# Patient Record
Sex: MALE | Race: WHITE | Employment: OTHER | URBAN - METROPOLITAN AREA
[De-identification: names, ages, dates, MRNs, and addresses within clinical notes are randomized per-mention and may not be internally consistent; named-entity substitution may affect disease eponyms.]

---

## 2019-05-23 LAB
ALBUMIN SERPL-MCNC: NORMAL G/DL
ALP BLD-CCNC: NORMAL U/L
ALT SERPL-CCNC: NORMAL U/L
ANION GAP SERPL CALCULATED.3IONS-SCNC: NORMAL MMOL/L
AST SERPL-CCNC: NORMAL U/L
AVERAGE GLUCOSE: NORMAL
BASOPHILS ABSOLUTE: NORMAL
BASOPHILS RELATIVE PERCENT: NORMAL
BILIRUB SERPL-MCNC: NORMAL MG/DL
BUN BLDV-MCNC: NORMAL MG/DL
CALCIUM SERPL-MCNC: NORMAL MG/DL
CHLORIDE BLD-SCNC: NORMAL MMOL/L
CO2: NORMAL
CREAT SERPL-MCNC: NORMAL MG/DL
EOSINOPHILS ABSOLUTE: NORMAL
EOSINOPHILS RELATIVE PERCENT: NORMAL
GFR CALCULATED: NORMAL
GLUCOSE BLD-MCNC: NORMAL MG/DL
HBA1C MFR BLD: 7 %
HCT VFR BLD CALC: NORMAL %
HEMOGLOBIN: NORMAL
LYMPHOCYTES ABSOLUTE: NORMAL
LYMPHOCYTES RELATIVE PERCENT: NORMAL
MCH RBC QN AUTO: NORMAL PG
MCHC RBC AUTO-ENTMCNC: NORMAL G/DL
MCV RBC AUTO: NORMAL FL
MONOCYTES ABSOLUTE: NORMAL
MONOCYTES RELATIVE PERCENT: NORMAL
NEUTROPHILS ABSOLUTE: NORMAL
NEUTROPHILS RELATIVE PERCENT: NORMAL
PLATELET # BLD: NORMAL 10*3/UL
PMV BLD AUTO: NORMAL FL
POTASSIUM SERPL-SCNC: NORMAL MMOL/L
RBC # BLD: NORMAL 10*6/UL
SODIUM BLD-SCNC: NORMAL MMOL/L
TOTAL PROTEIN: NORMAL
WBC # BLD: NORMAL 10*3/UL

## 2020-08-04 ENCOUNTER — TELEPHONE (OUTPATIENT)
Dept: PRIMARY CARE CLINIC | Age: 80
End: 2020-08-04

## 2020-09-09 RX ORDER — CELECOXIB 200 MG/1
200 CAPSULE ORAL DAILY
Qty: 30 CAPSULE | Refills: 0 | Status: ON HOLD
Start: 2020-09-09 | End: 2021-01-30 | Stop reason: HOSPADM

## 2020-09-09 RX ORDER — GABAPENTIN 300 MG/1
300 CAPSULE ORAL 2 TIMES DAILY
Qty: 60 CAPSULE | Refills: 0 | Status: SHIPPED
Start: 2020-09-09 | End: 2021-09-29 | Stop reason: SDUPTHER

## 2020-09-09 RX ORDER — METOPROLOL SUCCINATE 25 MG/1
25 TABLET, EXTENDED RELEASE ORAL 2 TIMES DAILY
Qty: 60 TABLET | Refills: 0 | Status: ON HOLD
Start: 2020-09-09 | End: 2021-01-30 | Stop reason: HOSPADM

## 2020-09-09 RX ORDER — GLIPIZIDE 5 MG/1
5 TABLET ORAL
Qty: 60 TABLET | Refills: 0 | Status: SHIPPED
Start: 2020-09-09 | End: 2022-02-08 | Stop reason: SDUPTHER

## 2020-09-09 RX ORDER — GABAPENTIN 300 MG/1
300 CAPSULE ORAL 2 TIMES DAILY
COMMUNITY
End: 2020-09-09 | Stop reason: SDUPTHER

## 2020-09-09 RX ORDER — CELECOXIB 200 MG/1
200 CAPSULE ORAL DAILY
COMMUNITY
End: 2020-09-09 | Stop reason: SDUPTHER

## 2020-09-09 RX ORDER — GLIPIZIDE 5 MG/1
5 TABLET ORAL
COMMUNITY
End: 2020-09-09 | Stop reason: SDUPTHER

## 2020-09-09 RX ORDER — METOPROLOL SUCCINATE 25 MG/1
25 TABLET, EXTENDED RELEASE ORAL 2 TIMES DAILY
COMMUNITY
End: 2020-09-09 | Stop reason: SDUPTHER

## 2020-10-01 RX ORDER — OMEPRAZOLE 20 MG/1
20 CAPSULE, DELAYED RELEASE ORAL DAILY
Status: ON HOLD | COMMUNITY
End: 2021-01-30 | Stop reason: HOSPADM

## 2021-01-11 ENCOUNTER — HOSPITAL ENCOUNTER (OUTPATIENT)
Age: 81
Discharge: HOME | End: 2021-01-13
Payer: MEDICARE

## 2021-01-11 PROCEDURE — U0003 INFECTIOUS AGENT DETECTION BY NUCLEIC ACID (DNA OR RNA); SEVERE ACUTE RESPIRATORY SYNDROME CORONAVIRUS 2 (SARS-COV-2) (CORONAVIRUS DISEASE [COVID-19]), AMPLIFIED PROBE TECHNIQUE, MAKING USE OF HIGH THROUGHPUT TECHNOLOGIES AS DESCRIBED BY CMS-2020-01-R: HCPCS

## 2021-01-12 DIAGNOSIS — Z01.810 PREOP CARDIOVASCULAR EXAM: ICD-10-CM

## 2021-01-13 LAB
SARS-COV-2: NOT DETECTED
SOURCE: NORMAL

## 2021-01-15 ENCOUNTER — HOSPITAL ENCOUNTER (INPATIENT)
Dept: CARDIAC CATH/INVASIVE PROCEDURES | Age: 81
LOS: 16 days | Discharge: NURSING, REHAB OR LONG-TERM CARE | DRG: 233 | End: 2021-02-01
Attending: INTERNAL MEDICINE | Admitting: INTERNAL MEDICINE
Payer: MEDICARE

## 2021-01-15 DIAGNOSIS — Z01.810 PREOP CARDIOVASCULAR EXAM: Primary | ICD-10-CM

## 2021-01-15 DIAGNOSIS — Z95.1 S/P CABG (CORONARY ARTERY BYPASS GRAFT): ICD-10-CM

## 2021-01-15 DIAGNOSIS — G89.18 ACUTE POST-OPERATIVE PAIN: ICD-10-CM

## 2021-01-15 DIAGNOSIS — I20.8 STABLE ANGINA PECTORIS (HCC): ICD-10-CM

## 2021-01-15 PROBLEM — I20.89 STABLE ANGINA PECTORIS (HCC): Status: ACTIVE | Noted: 2021-01-15

## 2021-01-15 LAB
ABO/RH: NORMAL
ANTIBODY SCREEN: NORMAL
APTT: 74.3 SEC (ref 24.5–35.1)
METER GLUCOSE: 266 MG/DL (ref 74–99)

## 2021-01-15 PROCEDURE — C1725 CATH, TRANSLUMIN NON-LASER: HCPCS

## 2021-01-15 PROCEDURE — 86901 BLOOD TYPING SEROLOGIC RH(D): CPT

## 2021-01-15 PROCEDURE — G0379 DIRECT REFER HOSPITAL OBSERV: HCPCS

## 2021-01-15 PROCEDURE — 96374 THER/PROPH/DIAG INJ IV PUSH: CPT

## 2021-01-15 PROCEDURE — 6370000000 HC RX 637 (ALT 250 FOR IP): Performed by: HOSPITALIST

## 2021-01-15 PROCEDURE — 36415 COLL VENOUS BLD VENIPUNCTURE: CPT

## 2021-01-15 PROCEDURE — 93454 CORONARY ARTERY ANGIO S&I: CPT

## 2021-01-15 PROCEDURE — B31H1ZZ FLUOROSCOPY OF RIGHT UPPER EXTREMITY ARTERIES USING LOW OSMOLAR CONTRAST: ICD-10-PCS | Performed by: INTERNAL MEDICINE

## 2021-01-15 PROCEDURE — 85730 THROMBOPLASTIN TIME PARTIAL: CPT

## 2021-01-15 PROCEDURE — 75710 ARTERY X-RAYS ARM/LEG: CPT

## 2021-01-15 PROCEDURE — 86900 BLOOD TYPING SEROLOGIC ABO: CPT

## 2021-01-15 PROCEDURE — 2580000003 HC RX 258: Performed by: INTERNAL MEDICINE

## 2021-01-15 PROCEDURE — 6370000000 HC RX 637 (ALT 250 FOR IP): Performed by: FAMILY MEDICINE

## 2021-01-15 PROCEDURE — 6360000002 HC RX W HCPCS

## 2021-01-15 PROCEDURE — 82962 GLUCOSE BLOOD TEST: CPT

## 2021-01-15 PROCEDURE — 037B3ZZ DILATION OF RIGHT RADIAL ARTERY, PERCUTANEOUS APPROACH: ICD-10-PCS | Performed by: INTERNAL MEDICINE

## 2021-01-15 PROCEDURE — 2500000003 HC RX 250 WO HCPCS

## 2021-01-15 PROCEDURE — G0378 HOSPITAL OBSERVATION PER HR: HCPCS

## 2021-01-15 PROCEDURE — 6370000000 HC RX 637 (ALT 250 FOR IP): Performed by: INTERNAL MEDICINE

## 2021-01-15 PROCEDURE — 2709999900 HC NON-CHARGEABLE SUPPLY

## 2021-01-15 PROCEDURE — C1894 INTRO/SHEATH, NON-LASER: HCPCS

## 2021-01-15 PROCEDURE — 86850 RBC ANTIBODY SCREEN: CPT

## 2021-01-15 PROCEDURE — C1769 GUIDE WIRE: HCPCS

## 2021-01-15 PROCEDURE — B2111ZZ FLUOROSCOPY OF MULTIPLE CORONARY ARTERIES USING LOW OSMOLAR CONTRAST: ICD-10-PCS | Performed by: INTERNAL MEDICINE

## 2021-01-15 PROCEDURE — 6360000002 HC RX W HCPCS: Performed by: INTERNAL MEDICINE

## 2021-01-15 PROCEDURE — 37246 TRLUML BALO ANGIOP 1ST ART: CPT

## 2021-01-15 PROCEDURE — C1887 CATHETER, GUIDING: HCPCS

## 2021-01-15 PROCEDURE — 93454 CORONARY ARTERY ANGIO S&I: CPT | Performed by: INTERNAL MEDICINE

## 2021-01-15 RX ORDER — ROSUVASTATIN CALCIUM 20 MG/1
20 TABLET, COATED ORAL NIGHTLY
Status: DISCONTINUED | OUTPATIENT
Start: 2021-01-15 | End: 2021-01-22

## 2021-01-15 RX ORDER — MECOBALAMIN 5000 MCG
10 TABLET,DISINTEGRATING ORAL NIGHTLY
Status: DISCONTINUED | OUTPATIENT
Start: 2021-01-15 | End: 2021-01-22

## 2021-01-15 RX ORDER — HEPARIN SODIUM 10000 [USP'U]/100ML
11.3 INJECTION, SOLUTION INTRAVENOUS CONTINUOUS
Status: DISCONTINUED | OUTPATIENT
Start: 2021-01-15 | End: 2021-01-22

## 2021-01-15 RX ORDER — DEXTROSE MONOHYDRATE 25 G/50ML
12.5 INJECTION, SOLUTION INTRAVENOUS PRN
Status: DISCONTINUED | OUTPATIENT
Start: 2021-01-15 | End: 2021-01-22

## 2021-01-15 RX ORDER — METOPROLOL SUCCINATE 50 MG/1
50 TABLET, EXTENDED RELEASE ORAL 2 TIMES DAILY
Status: DISCONTINUED | OUTPATIENT
Start: 2021-01-15 | End: 2021-01-22

## 2021-01-15 RX ORDER — SODIUM CHLORIDE 0.9 % (FLUSH) 0.9 %
10 SYRINGE (ML) INJECTION EVERY 12 HOURS SCHEDULED
Status: DISCONTINUED | OUTPATIENT
Start: 2021-01-15 | End: 2021-01-22

## 2021-01-15 RX ORDER — HEPARIN SODIUM 1000 [USP'U]/ML
2000 INJECTION, SOLUTION INTRAVENOUS; SUBCUTANEOUS PRN
Status: DISCONTINUED | OUTPATIENT
Start: 2021-01-15 | End: 2021-01-22

## 2021-01-15 RX ORDER — SODIUM CHLORIDE 0.9 % (FLUSH) 0.9 %
10 SYRINGE (ML) INJECTION PRN
Status: DISCONTINUED | OUTPATIENT
Start: 2021-01-15 | End: 2021-01-22

## 2021-01-15 RX ORDER — HEPARIN SODIUM 1000 [USP'U]/ML
4000 INJECTION, SOLUTION INTRAVENOUS; SUBCUTANEOUS PRN
Status: DISCONTINUED | OUTPATIENT
Start: 2021-01-15 | End: 2021-01-22

## 2021-01-15 RX ORDER — ASPIRIN 81 MG/1
81 TABLET, CHEWABLE ORAL DAILY
Status: DISCONTINUED | OUTPATIENT
Start: 2021-01-15 | End: 2021-01-22

## 2021-01-15 RX ORDER — NICOTINE POLACRILEX 4 MG
15 LOZENGE BUCCAL PRN
Status: DISCONTINUED | OUTPATIENT
Start: 2021-01-15 | End: 2021-01-22

## 2021-01-15 RX ORDER — HEPARIN SODIUM 1000 [USP'U]/ML
4000 INJECTION, SOLUTION INTRAVENOUS; SUBCUTANEOUS ONCE
Status: COMPLETED | OUTPATIENT
Start: 2021-01-15 | End: 2021-01-15

## 2021-01-15 RX ORDER — GABAPENTIN 300 MG/1
300 CAPSULE ORAL 2 TIMES DAILY
Status: DISCONTINUED | OUTPATIENT
Start: 2021-01-15 | End: 2021-02-01 | Stop reason: HOSPADM

## 2021-01-15 RX ORDER — DEXTROSE MONOHYDRATE 50 MG/ML
100 INJECTION, SOLUTION INTRAVENOUS PRN
Status: DISCONTINUED | OUTPATIENT
Start: 2021-01-15 | End: 2021-01-22

## 2021-01-15 RX ORDER — ACETAMINOPHEN 325 MG/1
650 TABLET ORAL EVERY 4 HOURS PRN
Status: DISCONTINUED | OUTPATIENT
Start: 2021-01-15 | End: 2021-01-22

## 2021-01-15 RX ORDER — AMLODIPINE BESYLATE 5 MG/1
5 TABLET ORAL DAILY
Status: DISCONTINUED | OUTPATIENT
Start: 2021-01-15 | End: 2021-01-16

## 2021-01-15 RX ADMIN — ROSUVASTATIN 20 MG: 20 TABLET, FILM COATED ORAL at 20:55

## 2021-01-15 RX ADMIN — HEPARIN SODIUM 4000 UNITS: 1000 INJECTION INTRAVENOUS; SUBCUTANEOUS at 16:27

## 2021-01-15 RX ADMIN — HEPARIN SODIUM 11.3 UNITS/KG/HR: 10000 INJECTION, SOLUTION INTRAVENOUS at 16:22

## 2021-01-15 RX ADMIN — INSULIN LISPRO 3 UNITS: 100 INJECTION, SOLUTION INTRAVENOUS; SUBCUTANEOUS at 20:56

## 2021-01-15 RX ADMIN — GABAPENTIN 300 MG: 300 CAPSULE ORAL at 16:29

## 2021-01-15 RX ADMIN — Medication 10 MG: at 22:14

## 2021-01-15 RX ADMIN — Medication 10 ML: at 20:55

## 2021-01-15 RX ADMIN — METOPROLOL SUCCINATE 50 MG: 50 TABLET, EXTENDED RELEASE ORAL at 20:55

## 2021-01-15 ASSESSMENT — PAIN SCALES - GENERAL
PAINLEVEL_OUTOF10: 0
PAINLEVEL_OUTOF10: 0

## 2021-01-15 NOTE — PROGRESS NOTES
vasc band weaned per protocol. see trend flowsheet. site soft. cleaned with alcohol. dsd applied with opsite drsg applied over that.radial pulse 3 plus.

## 2021-01-15 NOTE — H&P
Hospital Medicine History & Physical      PCP: WILVER Salguero NP    Date of Admission: 1/15/2021    Date of Service: Pt seen/examined on 1/15/21 Placed in Observation. Chief Complaint:  Chest pain      History Of Present Illness:     68 y.o. male with PMH of cancer, COPD, DM, blood clots, HLD and Covid who presented to [address] [place] [address] with chest pain. He states he had Covid back in November and during that time was believed to have had a non-STEMI. He presented to the hospital today for an elective cardiac cath. 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 CTS has been consulted. He will started on statin and BB. The patient states he has intermittent chest pain radiating to his back, states it is 6/10 and dull. He does not smoke but uses smokeless tobacco    Past Medical History:          Diagnosis Date    Arthritis     [facility]     COPD [facility]chronic obstructive pulmonary disease) ([facility]     Diabetes mellitus ([facility]     Hx of blood clots     Hyperlipidemia        Past Surgical History:          Procedure Laterality Date    BACK SURGERY  1995    CHOLECYSTECTOMY  2003    SKIN CANCER EXCISION         Medications Prior to Admission:      Prior to Admission medications    Medication Sig Start Date End Date Taking? Authorizing Provider   omeprazole (PRILOSEC) 20 MG delayed release capsule Take 20 mg by mouth daily   Yes Historical Provider, MD   MELATONIN PO Take by mouth   Yes Historical Provider, MD   celecoxib (CELEBREX) 200 MG capsule Take 1 capsule by mouth daily 9/9/20  Yes WILVER Hopper NP   glipiZIDE (GLUCOTROL) 5 MG tablet Take 1 tablet by mouth 2 times daily (before meals) 9/9/20  Yes WILVER Hopper NP   metoprolol succinate (TOPROL XL) 25 MG extended release tablet Take 1 tablet by mouth 2 times daily 9/9/20  Yes WILVER Hopper NP   gabapentin (NEURONTIN) 300 MG capsule Take 1 capsule by mouth 2 times daily for 30 days. 9/9/20 10/9/20  WILVER Eugene - NP       Allergies:  Shrimp (diagnostic) and Valium [diazepam]    Social History:      The patient currently lives at home. TOBACCO:   reports that he has quit smoking. He has never used smokeless tobacco.  ETOH:   has no history on file for alcohol. Family History:       Positive as follows:        Problem Relation Age of Onset    Diabetes Mother        REVIEW OF SYSTEMS:   Pertinent positives as noted in the HPI. All other systems reviewed and negative. PHYSICAL EXAM:    /73   Pulse 72   Temp 97.2 °F (36.2 °C) (Temporal)   Resp 16   Ht 5' 7\" (1.702 m)   Wt 195 lb (88.5 kg)   SpO2 94%   BMI 30.54 kg/m²     General appearance:  No apparent distress, appears stated age and cooperative. HEENT:  Normal cephalic, atraumatic without obvious deformity. Pupils equal, round, and reactive to light. Extra ocular muscles intact. Conjunctivae/corneas clear. Neck: Supple, with full range of motion. No jugular venous distention. Trachea midline. Respiratory:  Normal respiratory effort. Clear to auscultation, bilaterally without Rales/Wheezes/Rhonchi. Cardiovascular:  Regular rate and rhythm with normal S1/S2 without murmurs, rubs or gallops. Abdomen: Soft, non-tender, non-distended with normal bowel sounds. Musculoskeletal:  No clubbing, cyanosis or edema bilaterally. Full range of motion without deformity. Skin: Skin color, texture, turgor normal.  No rashes or lesions. Neurologic:  Neurovascularly intact without any focal sensory/motor deficits. Psychiatric:  Alert and oriented, thought content appropriate, normal insight  Capillary Refill: Brisk,< 3 seconds   Peripheral Pulses: +2 palpable, equal bilaterally           Labs:     No results for input(s): WBC, HGB, HCT, PLT in the last 72 hours. No results for input(s): NA, K, CL, CO2, BUN, CREATININE, CALCIUM, PHOS in the last 72 hours.     Invalid input(s): MAGNES  No results for input(s): AST, ALT, BILIDIR, BILITOT, ALKPHOS in the last 72 hours. No results for input(s): INR in the last 72 hours. No results for input(s): Geofm Squibb in the last 72 hours. Urinalysis:    No results found for: Elenin Delvison, BACTERIA, RBCUA, BLOODU, SPECGRAV, GLUCOSEU      ASSESSMENT:    Active Hospital Problems    Diagnosis Date Noted    Stable angina pectoris ([facility] [I20.8] 01/15/2021       PLAN:  Consult CTS  Start statin  BB  Home medications as ordered  Monitor labs  Daily weights  I/Os      DVT Prophylaxis: heparin  Diet: DIET LOW SODIUM 2 GM;  Code Status: Full Code    PT/OT Eval Status: n/a    Dispo - observation telemetry       Mamie Hendricks CNP    Thank you WILVER Acuna NP for the opportunity to be involved in this patient's care. If you have any questions or concerns please feel free to contact me via [address] [place] [address].

## 2021-01-15 NOTE — PROCEDURES
CARDIAC CATHETERIZATION REPORT    : Chapo Torres MD     Date of procedure:01/15/21     Indication:  1. Abnormal stress test    Procedures:  Left heart catheterization and Coronary angiogram     Sedation/analgesia:  Midazolam IV and Fentanyl IV     Time sedation was administered: 1019. I was present in the room when sedation was administered. Procedure end time: 1048  Time spent with face to face monitoring of moderate sedation: 38 minutes    Brief history:  72-year-old  male with hypertension, type 2 diabetes and COVID-19 pneumonia in November 2020 during which time he had a non-STEMI. Risk stratification perfusion study showed lateral and apical ischemia. TTE revealed normal LV size and systolic function and no severe valve disease. Description of procedure: The patient presented to the Cath Lab in a(n) elective fashion. The patient was prepped and draped in a sterile manner. Timeout, airway and ASA assessment were completed. Local anesthesia with 1% lidocaine was administered and sedation/analgesia were administered as above. Access was obtained using the modified Seldinger technique and a micropuncture needle in the right radial artery. A 6Fshort sheath was inserted over a wire. Diagnostic angiography was performed using 5F Lai and BOZENA diagnostic catheters. There was a significant stenosis in the proximal right radial artery that did not respond to vasodilators. I maneuvered through this using an Ikari R 1.5 guide over a [facility] wire using a 2 mm compliant balloon. Coronary anatomy:  Dominance: Right  1. Left main: Angiographically normal  2. LAD: 70% segmental proximal stenosis involving the takeoff of D1. There is a large bifurcating first diagonal; the superior branch in an [place] cranial view is the largest of the two and has a segmental 95% stenosis with GEOFFREY-3 flow.   The mid LAD has a 70% stenosis in the distal LAD has a 99% stenosis with GEOFFREY 2 flow  3. Ramus intermedius: Large vessel with 30% stenosis proximally and 99% segmental stenosis in its mid segment with GEOFFREY 2 flow  4. Left circumflex: Consists of a large OM and large AV groove circumflex. There is a 40% segmental stenosis in the distal circumflex  5. RCA: Fairly tortuous vessel with a segmental 50% stenosis. There is a large RPDA, medium-sized RPL1 and a large RPL2. There is a 40% stenosis in RPL2    Hemodynamics: Ao: 134/71 with a mean of 98      Hemostasis:  Transradial band was applied for patent arterial hemostasis. Complications: None  Estimated blood loss: 10 mL  Contrast used: 110 mL  Air kerma: 369 mGy    Conclusions:  1. Severe three-vessel disease:  a. Proximal LAD, mid LAD, distal LAD with impaired flow  b. Large D1  c. Large ramus intermedius with impaired flow  2. Significant stenosis in the proximal right radial artery not responsive to vasodilators navigated successfully with balloon-assisted tracking    PLAN:  1. Recommend inpatient revascularization (given impaired epicardial flow), preferably with CABG given high SYNTAX score with proximal LAD and diabetic status  2. Recommend continuous anticoagulation with unfractionated heparin versus Lovenox until revascularization given impaired flow in the LAD and large ramus intermedius  3.  High intensity statin and beta-blocker        James Patel MD, [facility]  Interventional Cardiology/Structural Heart Disease

## 2021-01-16 ENCOUNTER — APPOINTMENT (OUTPATIENT)
Dept: CT IMAGING | Age: 81
DRG: 233 | End: 2021-01-16
Attending: INTERNAL MEDICINE
Payer: MEDICARE

## 2021-01-16 PROBLEM — E78.5 HLD (HYPERLIPIDEMIA): Status: ACTIVE | Noted: 2021-01-16

## 2021-01-16 PROBLEM — J44.9 COPD (CHRONIC OBSTRUCTIVE PULMONARY DISEASE) (HCC): Status: ACTIVE | Noted: 2021-01-16

## 2021-01-16 PROBLEM — E11.9 TYPE 2 DIABETES MELLITUS (HCC): Status: ACTIVE | Noted: 2021-01-16

## 2021-01-16 PROBLEM — I25.10 CAD (CORONARY ARTERY DISEASE): Status: ACTIVE | Noted: 2021-01-16

## 2021-01-16 PROBLEM — I10 HTN (HYPERTENSION): Status: ACTIVE | Noted: 2021-01-16

## 2021-01-16 LAB
ANION GAP SERPL CALCULATED.3IONS-SCNC: 12 MMOL/L (ref 7–16)
APTT: 52.7 SEC (ref 24.5–35.1)
BUN BLDV-MCNC: 12 MG/DL (ref 8–23)
CALCIUM SERPL-MCNC: 8.8 MG/DL (ref 8.6–10.2)
CHLORIDE BLD-SCNC: 104 MMOL/L (ref 98–107)
CO2: 21 MMOL/L (ref 22–29)
CREAT SERPL-MCNC: 0.7 MG/DL (ref 0.7–1.2)
GFR AFRICAN AMERICAN: >60
GFR NON-AFRICAN AMERICAN: >60 ML/MIN/1.73
GLUCOSE BLD-MCNC: 158 MG/DL (ref 74–99)
HBA1C MFR BLD: 6.6 % (ref 4–5.6)
HCT VFR BLD CALC: 37.3 % (ref 37–54)
HEMOGLOBIN: 12.4 G/DL (ref 12.5–16.5)
LV EF: 55 %
LVEF MODALITY: NORMAL
MCH RBC QN AUTO: 29.4 PG (ref 26–35)
MCHC RBC AUTO-ENTMCNC: 33.2 % (ref 32–34.5)
MCV RBC AUTO: 88.4 FL (ref 80–99.9)
METER GLUCOSE: 142 MG/DL (ref 74–99)
METER GLUCOSE: 144 MG/DL (ref 74–99)
METER GLUCOSE: 178 MG/DL (ref 74–99)
METER GLUCOSE: 180 MG/DL (ref 74–99)
PDW BLD-RTO: 14.4 FL (ref 11.5–15)
PLATELET # BLD: 192 E9/L (ref 130–450)
PMV BLD AUTO: 9.2 FL (ref 7–12)
POTASSIUM REFLEX MAGNESIUM: 4.3 MMOL/L (ref 3.5–5)
RBC # BLD: 4.22 E12/L (ref 3.8–5.8)
SODIUM BLD-SCNC: 137 MMOL/L (ref 132–146)
WBC # BLD: 7 E9/L (ref 4.5–11.5)

## 2021-01-16 PROCEDURE — 99221 1ST HOSP IP/OBS SF/LOW 40: CPT | Performed by: THORACIC SURGERY (CARDIOTHORACIC VASCULAR SURGERY)

## 2021-01-16 PROCEDURE — 85027 COMPLETE CBC AUTOMATED: CPT

## 2021-01-16 PROCEDURE — G0378 HOSPITAL OBSERVATION PER HR: HCPCS

## 2021-01-16 PROCEDURE — 80048 BASIC METABOLIC PNL TOTAL CA: CPT

## 2021-01-16 PROCEDURE — 82962 GLUCOSE BLOOD TEST: CPT

## 2021-01-16 PROCEDURE — 93005 ELECTROCARDIOGRAM TRACING: CPT | Performed by: INTERNAL MEDICINE

## 2021-01-16 PROCEDURE — 83036 HEMOGLOBIN GLYCOSYLATED A1C: CPT

## 2021-01-16 PROCEDURE — 6370000000 HC RX 637 (ALT 250 FOR IP): Performed by: INTERNAL MEDICINE

## 2021-01-16 PROCEDURE — 2140000000 HC CCU INTERMEDIATE R&B

## 2021-01-16 PROCEDURE — 85730 THROMBOPLASTIN TIME PARTIAL: CPT

## 2021-01-16 PROCEDURE — 6370000000 HC RX 637 (ALT 250 FOR IP): Performed by: HOSPITALIST

## 2021-01-16 PROCEDURE — 36415 COLL VENOUS BLD VENIPUNCTURE: CPT

## 2021-01-16 PROCEDURE — 6370000000 HC RX 637 (ALT 250 FOR IP): Performed by: FAMILY MEDICINE

## 2021-01-16 PROCEDURE — 71250 CT THORAX DX C-: CPT

## 2021-01-16 PROCEDURE — 6360000002 HC RX W HCPCS: Performed by: INTERNAL MEDICINE

## 2021-01-16 PROCEDURE — 93306 TTE W/DOPPLER COMPLETE: CPT

## 2021-01-16 RX ORDER — DOCUSATE SODIUM 100 MG/1
100 CAPSULE, LIQUID FILLED ORAL NIGHTLY
Status: DISCONTINUED | OUTPATIENT
Start: 2021-01-16 | End: 2021-01-22

## 2021-01-16 RX ORDER — AMLODIPINE BESYLATE 10 MG/1
10 TABLET ORAL DAILY
Status: DISCONTINUED | OUTPATIENT
Start: 2021-01-16 | End: 2021-01-22

## 2021-01-16 RX ADMIN — DOCUSATE SODIUM 100 MG: 100 CAPSULE ORAL at 21:34

## 2021-01-16 RX ADMIN — GABAPENTIN 300 MG: 300 CAPSULE ORAL at 08:40

## 2021-01-16 RX ADMIN — INSULIN LISPRO 2 UNITS: 100 INJECTION, SOLUTION INTRAVENOUS; SUBCUTANEOUS at 08:41

## 2021-01-16 RX ADMIN — GABAPENTIN 300 MG: 300 CAPSULE ORAL at 21:34

## 2021-01-16 RX ADMIN — INSULIN LISPRO 2 UNITS: 100 INJECTION, SOLUTION INTRAVENOUS; SUBCUTANEOUS at 17:28

## 2021-01-16 RX ADMIN — AMLODIPINE BESYLATE 10 MG: 10 TABLET ORAL at 08:40

## 2021-01-16 RX ADMIN — INSULIN LISPRO 1 UNITS: 100 INJECTION, SOLUTION INTRAVENOUS; SUBCUTANEOUS at 21:38

## 2021-01-16 RX ADMIN — ASPIRIN 81 MG: 81 TABLET, CHEWABLE ORAL at 10:03

## 2021-01-16 RX ADMIN — INSULIN LISPRO 2 UNITS: 100 INJECTION, SOLUTION INTRAVENOUS; SUBCUTANEOUS at 12:38

## 2021-01-16 RX ADMIN — METOPROLOL SUCCINATE 50 MG: 50 TABLET, EXTENDED RELEASE ORAL at 21:34

## 2021-01-16 RX ADMIN — METOPROLOL SUCCINATE 50 MG: 50 TABLET, EXTENDED RELEASE ORAL at 08:40

## 2021-01-16 RX ADMIN — ROSUVASTATIN 20 MG: 20 TABLET, FILM COATED ORAL at 21:34

## 2021-01-16 RX ADMIN — Medication 10 MG: at 22:32

## 2021-01-16 RX ADMIN — HEPARIN SODIUM 11.3 UNITS/KG/HR: 10000 INJECTION, SOLUTION INTRAVENOUS at 17:29

## 2021-01-16 ASSESSMENT — PAIN SCALES - GENERAL
PAINLEVEL_OUTOF10: 0

## 2021-01-16 NOTE — CONSULTS
CTS Consult    Patient name: Malena Mandujano    Reason for consult:  MVCAD    Primary Care Physician: WILVER Tinoco NP    Date of service: 1/16/2021    Chief Complaint:  Angina     HPI:  67 yo male with hx of NSTEMI back in November at which time he was covid positive. Cath at that time was deferred, and he underwent outpatient workup and recovered from covid. He underwent elective LHC yesterday which revealed severe MVCAD. CTS is consulted for opinion on revascularization. Allergies:    Allergies   Allergen Reactions    Shrimp (Diagnostic)     Valium [Diazepam]        Home medications:    Current Facility-Administered Medications   Medication Dose Route Frequency Provider Last Rate Last Admin    perflutren lipid microspheres (DEFINITY) injection 1.65 mg  1.5 mL Intravenous ONCE PRN Jose Avila MD        amLODIPine (NORVASC) tablet 10 mg  10 mg Oral Daily Jose Avila MD   10 mg at 01/16/21 0840    gabapentin (NEURONTIN) capsule 300 mg  300 mg Oral BID Iveth Grossman MD   300 mg at 01/16/21 0840    metoprolol succinate (TOPROL XL) extended release tablet 50 mg  50 mg Oral BID Iveth Grossman MD   50 mg at 01/16/21 0840    sodium chloride flush 0.9 % injection 10 mL  10 mL Intravenous 2 times per day Iveth Grossman MD   10 mL at 01/15/21 2055    sodium chloride flush 0.9 % injection 10 mL  10 mL Intravenous PRN Iveth Grossman MD        acetaminophen (TYLENOL) tablet 650 mg  650 mg Oral Q4H PRN Iveth Grossman MD        aspirin chewable tablet 81 mg  81 mg Oral Daily Iveth Grossman MD        rosuvastatin (CRESTOR) tablet 20 mg  20 mg Oral Nightly Iveth Grossman MD   20 mg at 01/15/21 2055    heparin (porcine) injection 4,000 Units  4,000 Units Intravenous PRN Iveth Grossman MD        heparin (porcine) injection 2,000 Units  2,000 Units Intravenous PRN Iveth Grossman MD        heparin 25,000 units in dextrose 5% 250 mL infusion  11.3 Units/kg/hr Intravenous Continuous Iveth Grossman MD 10 mL/hr at 01/15/21 1622 11.3 Units/kg/hr at 01/15/21 1622    melatonin disintegrating tablet 10 mg  10 mg Oral Nightly Carlos Contes, DO   10 mg at 01/15/21 2214    insulin lispro (HUMALOG) injection vial 0-12 Units  0-12 Units Subcutaneous TID  Nilam Prescott MD   2 Units at 01/16/21 0841    insulin lispro (HUMALOG) injection vial 0-6 Units  0-6 Units Subcutaneous Nightly Nilam Mccarty MD   3 Units at 01/15/21 2056    glucose (GLUTOSE) 40 % oral gel 15 g  15 g Oral PRN Nilam Mccarty MD        dextrose 50 % IV solution  12.5 g Intravenous PRN Nilam Mccarty MD        glucagon (rDNA) injection 1 mg  1 mg Intramuscular PRN Nilam Mccarty MD        dextrose 5 % solution  100 mL/hr Intravenous PRN Nilam Prescott MD           Past Medical History:  Past Medical History:   Diagnosis Date    Arthritis     [facility]     [facility]     Diabetes mellitus ([facility]     Hx of blood clots     Hyperlipidemia        Past Surgical History:  Past Surgical History:   Procedure Laterality Date    BACK SURGERY  1995    CHOLECYSTECTOMY  2003    SKIN CANCER EXCISION         Social History:  Social History     Socioeconomic History    Marital status: Unknown     Spouse name: Not on file    Number of children: Not on file    Years of education: Not on file    Highest education level: Not on file   Occupational History    Not on file   Social Needs    Financial resource strain: Not on file    Food insecurity     Worry: Not on file     Inability: Not on file    Transportation needs     Medical: Not on file     Non-medical: Not on file   Tobacco Use    Smoking status: Former Smoker    Smokeless tobacco: Never Used   Substance and Sexual Activity    Alcohol use: Not on file    Drug use: Not on file    Sexual activity: Not on file   Lifestyle    Physical activity     Days per week: Not on file     Minutes per session: Not on file    Stress: Not on file   Relationships  Social connections     Talks on phone: Not on file     Gets together: Not on file     Attends Latter day service: Not on file     Active member of club or organization: Not on file     Attends meetings of clubs or organizations: Not on file     Relationship status: Not on file    Intimate partner violence     Fear of current or ex partner: Not on file     Emotionally abused: Not on file     Physically abused: Not on file     Forced sexual activity: Not on file   Other Topics Concern    Not on file   Social History Narrative    Not on file       Family History:  Family History   Problem Relation Age of Onset    Diabetes Mother        Review of Systems:  Constitutional: Denies fevers, chills, or weight loss. HEENT: Denies visual changes or hearing loss. Heart: As per HPI. Lungs: Denies shortness of breath, cough, or wheezing. Gastrointestinal: Denies nausea, vomiting, constipation, or diarrhea. Genitourinary: dysuria or hematuria. Psychiatric: Patient denies anxiety or depression. Neurologic: Patient denies weakness of the extremities, dizziness, or headaches. All other ROS checked and found to be negative. Labs:  Recent Labs     01/16/21  0529   WBC 7.0   HGB 12.4*   HCT 37.3         Recent Labs     01/16/21  0529   BUN 12   CREATININE 0.7       Objective:  Vitals BP (!) 164/74   Pulse 73   Temp 99.1 °F (37.3 °C) (Temporal)   Resp 16   Ht 5' 7\" (1.702 m)   Wt 195 lb (88.5 kg)   SpO2 94%   BMI 30.54 kg/m²   General Appearance: Pleasant 68y.o. year old male who appears stated age. Communicates well, no acute distress. HEENT: Head is normocephalic, atraumatic. EOMs intact, PERRL. Trachea midline. Lungs: Normal respiratory rate and normal effort. He is not in respiratory distress. Breath sounds clear to auscultation. No wheezes. Heart: Normal rate. Regular rhythm. S1 normal and S2 normal.    Chest: Symmetric chest wall expansion. Extremities: Normal range of motion. Neurological: Patient is alert and oriented to person, place and time. Patient has normal reflexes. Skin: Warm and dry. Abdomen: Abdomen is soft and non-distended. Bowel sounds are normal. There is no abdominal tenderness tenderness. There is no guarding. There is no mass. Pulses: Distal pulses are intact. Skin: Warm and dry without lesions. [facility] (Dr. Cedric Biswas)  Coronary anatomy:  Dominance: Right  1. Left main: Angiographically normal  2. LAD: 70% segmental proximal stenosis involving the takeoff of D1. There is a large bifurcating first diagonal; the superior branch in an [place] cranial view is the largest of the two and has a segmental 95% stenosis with GEOFFREY-3 flow. The mid LAD has a 70% stenosis in the distal LAD has a 99% stenosis with GEOFFREY 2 flow  3. Ramus intermedius: Large vessel with 30% stenosis proximally and 99% segmental stenosis in its mid segment with GEOFFREY 2 flow  4. Left circumflex: Consists of a large OM and large AV groove circumflex. There is a 40% segmental stenosis in the distal circumflex  5. RCA: Fairly tortuous vessel with a segmental 50% stenosis. There is a large RPDA, medium-sized RPL1 and a large RPL2. There is a 40% stenosis in RPL2      Assessment/Plan  67 yo male admitted with angina after [facility] which revealed severe MVCAD. Surgery was recommended. All risks, benefits, alternatives and potential complications explained thoroughly including, but not limited to, bleeding, infection, lung injury, kidney injury, stroke, heart attack, prolonged ventilation, wound complication, need for re-operation, and death. He currently is against surgery. I urged him to speak with his family, make a final decision, and I would talk to him again tomorrow. He stated \"I won't be here\". If he agrees to surgery, he will need preop workup prior as well as Plavix washout (given prior to procedure, however not documented?).         Electronically signed by Keerthi Landon DO on 1/16/2021 at 8:52 AM

## 2021-01-16 NOTE — CONSULTS
[facility] [address] [place] [address] CARDIOLOGY    Name: Pily Mack    Age: 68 y.o. Date of Admission: 1/15/2021 11:51 AM    Date of Service: 1/16/2021    Reason for Consultation: Coronary disease, recent non-STEMI, ischemic cardiomyopathy    Referring Physician: Bibiana    History of Present Illness: The patient is a 68y.o. year old male admitted to [facility] December 2020 with severe epigastric discomfort and subsequently found to have COVID 19 pneumonia at that time. Evolved non-STEMI but could not undergo heart catheterization because of active COVID 19 pneumonia at that time. [place] Moran revealed lateral ischemia and ejection fraction 45 to 50%. Subsequent heart catheterization yesterday 1/15 showed severe three-vessel CAD requiring bypass surgery. Currently in bed and without complaint of chest pain, worsening dyspnea, palpitations, syncope, presyncope. Did have epigastric pain with exertion that resolves with rest.  Currently on heparin infusion. Past Medical History:   Hypertension, hyperlipidemia, non-insulin-requiring diabetes mellitus, COPD, psoriatic arthritis. Review of Systems:     Constitutional: No fever, chills, sweats  Cardiac: As per HPI  Pulmonary: As per HPI  HEENT: No visual disturbances or difficult swallowing  GI: No nausea, vomiting, diarrhea, abdominal pain, rectal bleeding  : No dysuria or hematuria  Endocrine: No excessive thirst, heat or cold intolerance. Musculoskeletal: Joint pain but no muscle aches.  No claudication  Skin: No skin breakdown or rashes  Neuro: No headache, confusion, or seizures  Psych: No depression, anxiety      Family History:  Family History   Problem Relation Age of Onset    Diabetes Mother        Social History:  Social History     Socioeconomic History    Marital status: Unknown     Spouse name: Not on file    Number of children: Not on file    Years of education: Not on file    Highest education level: Not on file   Occupational History    Not on file   Social Needs    Financial resource strain: Not on file    Food insecurity     Worry: Not on file     Inability: Not on file    Transportation needs     Medical: Not on file     Non-medical: Not on file   Tobacco Use    Smoking status: Former Smoker    Smokeless tobacco: Never Used   Substance and Sexual Activity    Alcohol use: Not on file    Drug use: Not on file    Sexual activity: Not on file   Lifestyle    Physical activity     Days per week: Not on file     Minutes per session: Not on file    Stress: Not on file   Relationships    Social connections     Talks on phone: Not on file     Gets together: Not on file     Attends Anglican service: Not on file     Active member of club or organization: Not on file     Attends meetings of clubs or organizations: Not on file     Relationship status: Not on file    Intimate partner violence     Fear of current or ex partner: Not on file     Emotionally abused: Not on file     Physically abused: Not on file     Forced sexual activity: Not on file   Other Topics Concern    Not on file   Social History Narrative    Not on file       Allergies:   Allergies   Allergen Reactions    Shrimp (Diagnostic)     Valium [Diazepam]        Current Medications:  Current Facility-Administered Medications   Medication Dose Route Frequency Provider Last Rate Last Admin    gabapentin (NEURONTIN) capsule 300 mg  300 mg Oral BID Mitesh Escobar MD   300 mg at 01/15/21 1629    metoprolol succinate (TOPROL XL) extended release tablet 50 mg  50 mg Oral BID Mitesh Escobar MD   50 mg at 01/15/21 2055    sodium chloride flush 0.9 % injection 10 mL  10 mL Intravenous 2 times per day Mitesh Escobar MD   10 mL at 01/15/21 2055    sodium chloride flush 0.9 % injection 10 mL  10 mL Intravenous PRN Mitesh Escobar MD        acetaminophen (TYLENOL) tablet 650 mg  650 mg Oral Q4H PRN Mitesh Escobar MD        aspirin chewable tablet 81 mg  81 mg Oral Warm and dry, no cyanosis. Musculoskeletal: normal tone and strength in the upper and lower extremities bilaterally  Neuro: Grossly unremarkable  Psych: Cooperative, and normal affect    Intake/Output:    Intake/Output Summary (Last 24 hours) at 1/16/2021 0612  Last data filed at 1/16/2021 0225  Gross per 24 hour   Intake 120 ml   Output 300 ml   Net -180 ml     I/O this shift:  In: -   Out: 200 [Urine:200]    Laboratory Tests:  No results found for: CREATININE, BUN, NA, K, CL, CO2  No results for input(s): CKTOTAL, CKMB in the last 72 hours. Invalid input(s): TROPONONI  No results found for: BNP  No results found for: WBC, RBC, HGB, HCT, MCV, MCH, MCHC, RDW, PLT, MPV  No results for input(s): ALKPHOS, ALT, AST, PROT, BILITOT, BILIDIR, LABALBU in the last 72 hours. No results found for: MG  No results found for: PROTIME, INR  No results found for: TSH  No components found for: CHLPL  No results found for: TRIG  No results found for: HDL  No results found for: LDLCALC  No results found for: INR    Admission ECG is currently pending. ASSESSMENT / PLAN:    #1.  Worsening angina and coronary disease post recent non-STEMI-diabetic with proximal LAD disease and thus requires CABG. Cardiothoracic surgery consulted. Continue aspirin and beta-blocker and heparin infusion. #2.  Ischemic cardiomyopathy-echocardiogram to assess left ventricular function while admitted. Continue beta-blocker. #3. Hypertension-blood pressure elevated. Add isosorbide mononitrate 60 mg daily and increase amlodipine to 10 mg daily for both angina relief and blood pressure control. #4. Hyperlipidemia-statin. Lipid profile this morning. #5.  Diabetes-per hospitalist team.    #6.  Continue to follow.         Electronically signed by Manual Kussmaul, MD on 1/16/2021 at 6:12 AM

## 2021-01-16 NOTE — PATIENT CARE CONFERENCE
[facility] Quality Flow/Interdisciplinary Rounds Progress Note        Quality Flow Rounds held on January 16, 2021    Disciplines Attending:  Bedside Nurse, ,  and Nursing Unit Anastasiya Martinez was admitted on 1/15/2021 11:51 AM    Anticipated Discharge Date:  Expected Discharge Date: 01/16/21    Disposition:    Denny Score:  Denny Scale Score: 22    Readmission Risk              Risk of Unplanned Readmission:        0           Discussed patient goal for the day, patient clinical progression, and barriers to discharge.   The following Goal(s) of the Day/Commitment(s) have been identified:  Diagnostics - Report Results         Mary Collado  January 16, 2021

## 2021-01-16 NOTE — PROGRESS NOTES
Hospitalist Progress Note      PCP: WILVER Caruso NP    Date of Admission: 1/15/2021    Chief Complaint: chest pain    Hospital Course:   68 y.o. male with PMH of cancer, COPD, DM, blood clots, HLD and Covid who presented to [address] [place] [address] with chest pain. He states he had Covid back in November and during that time was believed to have had a non-STEMI. He presented to the hospital today for an elective cardiac cath. 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 CTS has been consulted. He will started on statin and BB. The patient states he has intermittent chest pain radiating to his back, states it is 6/10 and dull. He does not smoke but uses smokeless tobacco  1/16: seen by CTS, patient is refusing surgery at this time-- encouraged to speak with family. BP improved. BGL stable. The patient is considering the surgery, but would like to have CTS speak with daughter tomorrow morning on phone when they round. He did voice some of his concerns with me about the surgery. He doesn't like staying in hospitals, he's concerned about how long he will be inpatient, he's concerned he won't be able to go home and take care of himself, he's concerned about pain from surgery. I have attempted to call his daughter several times without any answer on her phone. Subjective:    Patient sitting up in bed. He denies any chest pain at the present time.      Medications:  Reviewed    Infusion Medications    heparin (PORCINE) Infusion 11.3 Units/kg/hr (01/15/21 1622)    dextrose       Scheduled Medications    amLODIPine  10 mg Oral Daily    gabapentin  300 mg Oral BID    metoprolol succinate  50 mg Oral BID    sodium chloride flush  10 mL Intravenous 2 times per day    aspirin  81 mg Oral Daily    rosuvastatin  20 mg Oral Nightly    melatonin  10 mg Oral Nightly    insulin lispro  0-12 Units Subcutaneous TID WC    insulin lispro  0-6 Units Subcutaneous Nightly     PRN Meds: perflutren (Results Pending)    CAROTID ARTERY BILATERAL    (Results Pending)       Assessment/Plan:    Active Hospital Problems    Diagnosis Date Noted    COPD (chronic obstructive pulmonary disease) ([facility] [J44.9] 01/16/2021    HTN (hypertension) [I10] 01/16/2021    Type 2 diabetes mellitus ([facility] [E11.9] 01/16/2021    HLD (hyperlipidemia) [E78.5] 01/16/2021    CAD (coronary artery disease) [I25.10] 01/16/2021    Stable angina pectoris ([facility] [I20.8] 01/15/2021     Plan  Recommendations per CTS  Recommendations per cardiology  BP management  BGL management  Daily weights  I/Os  Neurovascular checks      DVT Prophylaxis: heparin  Diet: DIET LOW SODIUM 2 GM; Carb Control: 4 carb choices (60 gms)/meal  Code Status: Full Code    PT/OT Eval Status: n/a    Dispo - admit telemetry    Tea Spicer, AUGUSTINE

## 2021-01-17 ENCOUNTER — APPOINTMENT (OUTPATIENT)
Dept: ULTRASOUND IMAGING | Age: 81
DRG: 233 | End: 2021-01-17
Attending: INTERNAL MEDICINE
Payer: MEDICARE

## 2021-01-17 LAB
APTT: 61.1 SEC (ref 24.5–35.1)
BILIRUBIN URINE: NEGATIVE
BLOOD, URINE: NEGATIVE
CLARITY: CLEAR
COLOR: YELLOW
GLUCOSE URINE: NEGATIVE MG/DL
HCT VFR BLD CALC: 39.1 % (ref 37–54)
HEMOGLOBIN: 13.1 G/DL (ref 12.5–16.5)
KETONES, URINE: NEGATIVE MG/DL
LEUKOCYTE ESTERASE, URINE: NEGATIVE
MCH RBC QN AUTO: 29.5 PG (ref 26–35)
MCHC RBC AUTO-ENTMCNC: 33.5 % (ref 32–34.5)
MCV RBC AUTO: 88.1 FL (ref 80–99.9)
METER GLUCOSE: 159 MG/DL (ref 74–99)
METER GLUCOSE: 167 MG/DL (ref 74–99)
METER GLUCOSE: 296 MG/DL (ref 74–99)
METER GLUCOSE: 396 MG/DL (ref 74–99)
NITRITE, URINE: NEGATIVE
PDW BLD-RTO: 14.5 FL (ref 11.5–15)
PH UA: 5.5 (ref 5–9)
PLATELET # BLD: 192 E9/L (ref 130–450)
PMV BLD AUTO: 9.2 FL (ref 7–12)
PROTEIN UA: NEGATIVE MG/DL
RBC # BLD: 4.44 E12/L (ref 3.8–5.8)
SPECIFIC GRAVITY UA: 1.02 (ref 1–1.03)
UROBILINOGEN, URINE: 0.2 E.U./DL
WBC # BLD: 5.7 E9/L (ref 4.5–11.5)

## 2021-01-17 PROCEDURE — 99232 SBSQ HOSP IP/OBS MODERATE 35: CPT | Performed by: THORACIC SURGERY (CARDIOTHORACIC VASCULAR SURGERY)

## 2021-01-17 PROCEDURE — 6370000000 HC RX 637 (ALT 250 FOR IP): Performed by: INTERNAL MEDICINE

## 2021-01-17 PROCEDURE — 93970 EXTREMITY STUDY: CPT | Performed by: RADIOLOGY

## 2021-01-17 PROCEDURE — 2580000003 HC RX 258: Performed by: INTERNAL MEDICINE

## 2021-01-17 PROCEDURE — 81003 URINALYSIS AUTO W/O SCOPE: CPT

## 2021-01-17 PROCEDURE — 6370000000 HC RX 637 (ALT 250 FOR IP): Performed by: HOSPITALIST

## 2021-01-17 PROCEDURE — 85027 COMPLETE CBC AUTOMATED: CPT

## 2021-01-17 PROCEDURE — 6370000000 HC RX 637 (ALT 250 FOR IP): Performed by: CLINICAL NURSE SPECIALIST

## 2021-01-17 PROCEDURE — 6370000000 HC RX 637 (ALT 250 FOR IP): Performed by: FAMILY MEDICINE

## 2021-01-17 PROCEDURE — 93880 EXTRACRANIAL BILAT STUDY: CPT | Performed by: RADIOLOGY

## 2021-01-17 PROCEDURE — 36415 COLL VENOUS BLD VENIPUNCTURE: CPT

## 2021-01-17 PROCEDURE — 93970 EXTREMITY STUDY: CPT

## 2021-01-17 PROCEDURE — 82962 GLUCOSE BLOOD TEST: CPT

## 2021-01-17 PROCEDURE — 2140000000 HC CCU INTERMEDIATE R&B

## 2021-01-17 PROCEDURE — 93880 EXTRACRANIAL BILAT STUDY: CPT

## 2021-01-17 PROCEDURE — 85730 THROMBOPLASTIN TIME PARTIAL: CPT

## 2021-01-17 RX ADMIN — INSULIN LISPRO 2 UNITS: 100 INJECTION, SOLUTION INTRAVENOUS; SUBCUTANEOUS at 08:00

## 2021-01-17 RX ADMIN — AMLODIPINE BESYLATE 10 MG: 10 TABLET ORAL at 09:56

## 2021-01-17 RX ADMIN — ASPIRIN 81 MG: 81 TABLET, CHEWABLE ORAL at 09:56

## 2021-01-17 RX ADMIN — Medication 10 ML: at 09:00

## 2021-01-17 RX ADMIN — Medication 10 MG: at 22:40

## 2021-01-17 RX ADMIN — INSULIN LISPRO 1 UNITS: 100 INJECTION, SOLUTION INTRAVENOUS; SUBCUTANEOUS at 21:21

## 2021-01-17 RX ADMIN — INSULIN LISPRO 6 UNITS: 100 INJECTION, SOLUTION INTRAVENOUS; SUBCUTANEOUS at 13:03

## 2021-01-17 RX ADMIN — METOPROLOL SUCCINATE 50 MG: 50 TABLET, EXTENDED RELEASE ORAL at 21:18

## 2021-01-17 RX ADMIN — GABAPENTIN 300 MG: 300 CAPSULE ORAL at 21:18

## 2021-01-17 RX ADMIN — ROSUVASTATIN 20 MG: 20 TABLET, FILM COATED ORAL at 21:18

## 2021-01-17 RX ADMIN — METOPROLOL SUCCINATE 50 MG: 50 TABLET, EXTENDED RELEASE ORAL at 09:56

## 2021-01-17 RX ADMIN — INSULIN LISPRO 10 UNITS: 100 INJECTION, SOLUTION INTRAVENOUS; SUBCUTANEOUS at 17:30

## 2021-01-17 RX ADMIN — GABAPENTIN 300 MG: 300 CAPSULE ORAL at 10:40

## 2021-01-17 RX ADMIN — DOCUSATE SODIUM 100 MG: 100 CAPSULE ORAL at 21:18

## 2021-01-17 RX ADMIN — GLYCERIN 2 G: 2 SUPPOSITORY RECTAL at 17:38

## 2021-01-17 ASSESSMENT — PAIN SCALES - GENERAL
PAINLEVEL_OUTOF10: 0

## 2021-01-17 NOTE — PROGRESS NOTES
Hospitalist Progress Note      PCP: WILVER Campbell NP    Date of Admission: 1/15/2021    Chief Complaint: chest pain    Hospital Course:   68 y.o. male with PMH of cancer, COPD, DM, blood clots, HLD and Covid who presented to [address] [place] [address] with chest pain. He states he had Covid back in November and during that time was believed to have had a non-STEMI. He presented to the hospital today for an elective cardiac cath. 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 CTS has been consulted. He will started on statin and BB. The patient states he has intermittent chest pain radiating to his back, states it is 6/10 and dull. He does not smoke but uses smokeless tobacco  1/16: seen by CTS, patient is refusing surgery at this time-- encouraged to speak with family. BP improved. BGL stable. The patient is considering the surgery, but would like to have CTS speak with daughter tomorrow morning on phone when they round. He did voice some of his concerns with me about the surgery. He doesn't like staying in hospitals, he's concerned about how long he will be inpatient, he's concerned he won't be able to go home and take care of himself, he's concerned about pain from surgery. I have attempted to call his daughter several times without any answer on her phone. 1/17: seen by cardiology and CTS, patient now considering CABG. preop workup continues. The patient voiced his biggest concern is post-op pain. Subjective:    Patient sitting up in bed. He states he has not made a decision yet as far as surgery, but is more in favor of it today than yesterday. He states his biggest concern is post-op pain. He is also complaining of constipation today.      Medications:  Reviewed    Infusion Medications    heparin (PORCINE) Infusion 11.3 Units/kg/hr (01/16/21 0224)    dextrose       Scheduled Medications    amLODIPine  10 mg Oral Daily    docusate sodium  100 mg Oral Nightly    gabapentin  300 mg Oral BID    metoprolol succinate  50 mg Oral BID    sodium chloride flush  10 mL Intravenous 2 times per day    aspirin  81 mg Oral Daily    rosuvastatin  20 mg Oral Nightly    melatonin  10 mg Oral Nightly    insulin lispro  0-12 Units Subcutaneous TID WC    insulin lispro  0-6 Units Subcutaneous Nightly     PRN Meds: perflutren lipid microspheres, sodium chloride flush, acetaminophen, heparin (porcine), heparin (porcine), glucose, dextrose, glucagon (rDNA), dextrose      Intake/Output Summary (Last 24 hours) at 1/17/2021 1022  Last data filed at 1/17/2021 0830  Gross per 24 hour   Intake 500 ml   Output 250 ml   Net 250 ml       Exam:    BP (!) 155/70   Pulse 79   Temp 97.9 °F (36.6 °C) (Temporal)   Resp 18   Ht 5' 7\" (1.702 m)   Wt 195 lb (88.5 kg)   SpO2 93%   BMI 30.54 kg/m²     General appearance: No apparent distress, appears stated age and cooperative. HEENT: Pupils equal, round, and reactive to light. Conjunctivae/corneas clear. Neck: Supple, with full range of motion. No jugular venous distention. Trachea midline. Respiratory:  Normal respiratory effort. Clear to auscultation, bilaterally without Rales/Wheezes/Rhonchi. Cardiovascular: Regular rate and rhythm with normal S1/S2 without murmurs, rubs or gallops. Abdomen: Soft, non-tender, non-distended with normal bowel sounds. Musculoskeletal: No clubbing, cyanosis or edema bilaterally. Full range of motion without deformity. Skin: Skin color, texture, turgor normal.  No rashes or lesions. Neurologic:  Neurovascularly intact without any focal sensory/motor deficits.    Psychiatric: Alert and oriented, thought content appropriate, normal insight  Capillary Refill: Brisk,< 3 seconds   Peripheral Pulses: +2 palpable, equal bilaterally       Labs:   Recent Labs     01/16/21  0529 01/17/21  0539   WBC 7.0 5.7   HGB 12.4* 13.1   HCT 37.3 39.1    192     Recent Labs     01/16/21  0529      K 4.3      CO2 21*   BUN 12 CREATININE 0.7   CALCIUM 8.8     No results for input(s): AST, ALT, BILIDIR, BILITOT, ALKPHOS in the last 72 hours. No results for input(s): INR in the last 72 hours. No results for input(s): Wallace Addis in the last 72 hours. CT CHEST WO CONTRAST   Final Result   1. Loculated right pleural effusion with adjacent subsegmental atelectasis. 2.  No evidence of pneumonia. 3.  Atherosclerotic calcifications associated with the coronary arteries.       VL NED BILATERAL LIMITED 1-2 LEVELS    (Results Pending)   US DUP LOWER EXTREMITY MAPPING BILAT VENOUS    (Results Pending)   US CAROTID ARTERY BILATERAL    (Results Pending)       Assessment/Plan:    Active Hospital Problems    Diagnosis Date Noted    COPD (chronic obstructive pulmonary disease) ([facility] [J44.9] 01/16/2021    HTN (hypertension) [I10] 01/16/2021    Type 2 diabetes mellitus ([facility] [E11.9] 01/16/2021    HLD (hyperlipidemia) [E78.5] 01/16/2021    CAD (coronary artery disease) [I25.10] 01/16/2021    Stable angina pectoris ([facility] [I20.8] 01/15/2021     Plan  Recommendations per CTS  Recommendations per cardiology  BP management  BGL management  Daily weights  I/Os  Neurovascular checks  preop workup    DVT Prophylaxis: heparin  Diet: DIET LOW SODIUM 2 GM; Carb Control: 4 carb choices (60 gms)/meal  Code Status: Full Code    PT/OT Eval Status: n/a    Dispo - admit telemetry    Kirsty Graf CNP

## 2021-01-17 NOTE — PROGRESS NOTES
Seen and examined  No chest pain     Spoke to Mr Jose Patel and his daughter at length regarding R/B/A and details of surgery.       They will make a decision regarding surgery soon    Will continue preop workup

## 2021-01-17 NOTE — PROGRESS NOTES
DAILY PROGRESS NOTE - THE HEART CENTER    SUBJECTIVE:    He is being followed for surgical coronary disease. Reportedly unwilling to undergo CABG and this morning says he is \"thinking about it. \"  Asking me to speak with his daughter Rigoberto Selby, phone [phone],  whom I met in the office several weeks ago when arranging heart catheterization. No chest pain, worsening dyspnea, palpitations, syncope, presyncope. Echo showed LVEF 01%, stage I diastolic dysfunction, trace valvular regurgitation. OBJECTIVE:    His vital signs were reviewed today. Vitals:    01/17/21 0430   BP: 133/60   Pulse: 70   Resp: 16   Temp:    SpO2:        Scheduled Meds:   amLODIPine  10 mg Oral Daily    docusate sodium  100 mg Oral Nightly    gabapentin  300 mg Oral BID    metoprolol succinate  50 mg Oral BID    sodium chloride flush  10 mL Intravenous 2 times per day    aspirin  81 mg Oral Daily    rosuvastatin  20 mg Oral Nightly    melatonin  10 mg Oral Nightly    insulin lispro  0-12 Units Subcutaneous TID WC    insulin lispro  0-6 Units Subcutaneous Nightly     Continuous Infusions:   heparin (PORCINE) Infusion 11.3 Units/kg/hr (01/16/21 1729)    dextrose       PRN Meds:.perflutren lipid microspheres, sodium chloride flush, acetaminophen, heparin (porcine), heparin (porcine), glucose, dextrose, glucagon (rDNA), dextrose    REVIEW OF SYSTEMS:     No pruritus, rash, bruising. Cardiac symptoms per HPI. No nausea, vomiting, abdominal pain, GI bleeding, change in bowel habits. No dysuria, urinary frequency, urgency, hematuria, flank pain. Joint pain but no muscle soreness, stiffness, aching. No headache, speech disturbance, lateralizing neurologic deficit. No hemoptysis, epistaxis, easy bruising. No anxiety, depression. No symptoms of hypothyroidism, hyperthyroidism, diabetes, heat or cold intolerance. FAMILY HISTORY: Negative for CAD in first-degree relatives.     SOCIAL HISTORY: Negative for alcohol, tobacco, or illicit drug use. PHYSICAL EXAM:    General Appearance:  awake, alert, oriented, in no acute distress  Neck:  no bruits  Lungs:  Normal expansion. Clear to auscultation. No rales, rhonchi, or wheezing. Heart:  Heart sounds are normal.  Regular rate and rhythm without murmur, gallop or rub. Abdomen:  Soft, non-tender. Extremities: Extremities warm to touch, pink, with no edema. Neuro/musculosketal:  Unremarkable. LABS:    Recent Labs     01/16/21  0529      CREATININE 0.7       Recent Labs     01/16/21  0529 01/17/21  0539   HGB 12.4* 13.1       No results for input(s): INR in the last 72 hours. IMPRESSION:    #1.  Recent non-STEMI with surgical disease-CABG planned. Despite initially refusing, it appears that he will most likely agree to undergo CABG. Explained to him that longevity given his non-STEMI and severe proximal LAD disease is around 2 years or less. His daughter is very reasonable and able to influence him beneficially. I will contact her later on today. Continue aspirin, beta-blocker, heparin infusion. #2. Hypertension-blood pressure much better controlled with increased amlodipine dosage and addition of isosorbide. #3. Hyperlipidemia-statin. #4.  Diabetes-per hospitalist.    #5.  Continue to follow.

## 2021-01-18 ENCOUNTER — APPOINTMENT (OUTPATIENT)
Dept: INTERVENTIONAL RADIOLOGY/VASCULAR | Age: 81
DRG: 233 | End: 2021-01-18
Attending: INTERNAL MEDICINE
Payer: MEDICARE

## 2021-01-18 LAB
% INHIBITION AA: 27.9 %
% INHIBITION ADP: 100 %
ANGLE (CLOT STRENGTH): 65.7 DEGREE (ref 59–74)
APTT: 54.7 SEC (ref 24.5–35.1)
EKG ATRIAL RATE: 70 BPM
EKG P AXIS: 17 DEGREES
EKG P-R INTERVAL: 124 MS
EKG Q-T INTERVAL: 414 MS
EKG QRS DURATION: 94 MS
EKG QTC CALCULATION (BAZETT): 447 MS
EKG R AXIS: 39 DEGREES
EKG T AXIS: 46 DEGREES
EKG VENTRICULAR RATE: 70 BPM
EPL-TEG: 0.5 % (ref 0–15)
G-TEG: 9.6 K D/SC (ref 4.5–11)
GLUCOSE BLD-MCNC: 163 MG/DL (ref 74–99)
K (CLOTTING TIME): 2.2 MIN (ref 1–3)
LY30 (FIBRINOLYSIS): 0.5 % (ref 0–8)
MA (MAX AMPLITUDE): 65.8 MM (ref 50–70)
MA-AA: 63.9 MM
MA-ACTIVATED: 59 MM
MA-ADP: 41.6 MM
MA-TEG BASELINE: 65.8 MM
METER GLUCOSE: 231 MG/DL (ref 74–99)
METER GLUCOSE: 243 MG/DL (ref 74–99)
METER GLUCOSE: 254 MG/DL (ref 74–99)
METER GLUCOSE: 284 MG/DL (ref 74–99)
R (REACTION TIME): 6.9 MIN (ref 5–10)

## 2021-01-18 PROCEDURE — 6370000000 HC RX 637 (ALT 250 FOR IP): Performed by: HOSPITALIST

## 2021-01-18 PROCEDURE — 6370000000 HC RX 637 (ALT 250 FOR IP): Performed by: INTERNAL MEDICINE

## 2021-01-18 PROCEDURE — 94729 DIFFUSING CAPACITY: CPT | Performed by: INTERNAL MEDICINE

## 2021-01-18 PROCEDURE — 6370000000 HC RX 637 (ALT 250 FOR IP): Performed by: CLINICAL NURSE SPECIALIST

## 2021-01-18 PROCEDURE — 82947 ASSAY GLUCOSE BLOOD QUANT: CPT

## 2021-01-18 PROCEDURE — 94726 PLETHYSMOGRAPHY LUNG VOLUMES: CPT | Performed by: INTERNAL MEDICINE

## 2021-01-18 PROCEDURE — 2140000000 HC CCU INTERMEDIATE R&B

## 2021-01-18 PROCEDURE — 85384 FIBRINOGEN ACTIVITY: CPT

## 2021-01-18 PROCEDURE — 85576 BLOOD PLATELET AGGREGATION: CPT

## 2021-01-18 PROCEDURE — 94729 DIFFUSING CAPACITY: CPT

## 2021-01-18 PROCEDURE — 36415 COLL VENOUS BLD VENIPUNCTURE: CPT

## 2021-01-18 PROCEDURE — 6360000002 HC RX W HCPCS: Performed by: INTERNAL MEDICINE

## 2021-01-18 PROCEDURE — 94375 RESPIRATORY FLOW VOLUME LOOP: CPT

## 2021-01-18 PROCEDURE — 85347 COAGULATION TIME ACTIVATED: CPT

## 2021-01-18 PROCEDURE — 82962 GLUCOSE BLOOD TEST: CPT

## 2021-01-18 PROCEDURE — 94010 BREATHING CAPACITY TEST: CPT | Performed by: INTERNAL MEDICINE

## 2021-01-18 PROCEDURE — 93922 UPR/L XTREMITY ART 2 LEVELS: CPT

## 2021-01-18 PROCEDURE — 6370000000 HC RX 637 (ALT 250 FOR IP): Performed by: FAMILY MEDICINE

## 2021-01-18 PROCEDURE — 2580000003 HC RX 258: Performed by: INTERNAL MEDICINE

## 2021-01-18 PROCEDURE — 85730 THROMBOPLASTIN TIME PARTIAL: CPT

## 2021-01-18 RX ORDER — ISOSORBIDE MONONITRATE 60 MG/1
60 TABLET, EXTENDED RELEASE ORAL DAILY
Status: DISCONTINUED | OUTPATIENT
Start: 2021-01-18 | End: 2021-01-22

## 2021-01-18 RX ADMIN — INSULIN LISPRO 6 UNITS: 100 INJECTION, SOLUTION INTRAVENOUS; SUBCUTANEOUS at 10:10

## 2021-01-18 RX ADMIN — METOPROLOL SUCCINATE 50 MG: 50 TABLET, EXTENDED RELEASE ORAL at 10:14

## 2021-01-18 RX ADMIN — Medication 10 ML: at 22:00

## 2021-01-18 RX ADMIN — Medication 10 MG: at 21:59

## 2021-01-18 RX ADMIN — AMLODIPINE BESYLATE 10 MG: 10 TABLET ORAL at 10:14

## 2021-01-18 RX ADMIN — GABAPENTIN 300 MG: 300 CAPSULE ORAL at 10:14

## 2021-01-18 RX ADMIN — INSULIN LISPRO 4 UNITS: 100 INJECTION, SOLUTION INTRAVENOUS; SUBCUTANEOUS at 12:05

## 2021-01-18 RX ADMIN — ASPIRIN 81 MG: 81 TABLET, CHEWABLE ORAL at 12:06

## 2021-01-18 RX ADMIN — DOCUSATE SODIUM 100 MG: 100 CAPSULE ORAL at 21:59

## 2021-01-18 RX ADMIN — ISOSORBIDE MONONITRATE 60 MG: 60 TABLET ORAL at 10:14

## 2021-01-18 RX ADMIN — INSULIN LISPRO 6 UNITS: 100 INJECTION, SOLUTION INTRAVENOUS; SUBCUTANEOUS at 17:38

## 2021-01-18 RX ADMIN — GABAPENTIN 300 MG: 300 CAPSULE ORAL at 21:59

## 2021-01-18 RX ADMIN — HEPARIN SODIUM 11.19 UNITS/KG/HR: 10000 INJECTION, SOLUTION INTRAVENOUS at 01:20

## 2021-01-18 RX ADMIN — ROSUVASTATIN 20 MG: 20 TABLET, FILM COATED ORAL at 21:59

## 2021-01-18 RX ADMIN — INSULIN LISPRO 5 UNITS: 100 INJECTION, SOLUTION INTRAVENOUS; SUBCUTANEOUS at 22:03

## 2021-01-18 RX ADMIN — METOPROLOL SUCCINATE 50 MG: 50 TABLET, EXTENDED RELEASE ORAL at 21:59

## 2021-01-18 ASSESSMENT — PAIN SCALES - GENERAL: PAINLEVEL_OUTOF10: 0

## 2021-01-18 NOTE — CARE COORDINATION
1/18/2021 - Care Coordination - admitted for chest pain. Cardiology consulted. Cardiac cath done revealing severe triple vessel disease. Cardiothoracic surgery consulted. Pt for CABG. Spoke with pt in room to discuss discharge planning. PCP is Dr Muna Vieyra NP. Pharmacy is [facility] in [place]. Lives with his son in a ranch style home with 3 steps to enter house. Denies hx HHC or [facility]U. DME - ww, cane, grabber. Pre-op testing will be done and pt will need plavix wash out. Plan is to return home when medically stable. Family can provide transportation home. SW/CM will follow.

## 2021-01-18 NOTE — PROGRESS NOTES
DAILY PROGRESS NOTE - THE HEART [facility]ENTER    SUBJECTIVE:    He is being followed for surgical coronary disease. I spoke with his daughter via phone yesterday for around 15 to 20 minutes and she states that he informed her he is willing to undergo surgery. No reported chest pain, worsening dyspnea, palpitations, syncope, or presyncope overnight. Echo showed LVEF 77%, stage I diastolic dysfunction, trace valvular regurgitation. OBJECTIVE:    His vital signs were reviewed today. Vitals:    01/18/21 0444   BP: 101/61   Pulse: 75   Resp: 16   Temp: 97.5 °F (36.4 °C)   SpO2:        Scheduled Meds:   isosorbide mononitrate  60 mg Oral Daily    amLODIPine  10 mg Oral Daily    docusate sodium  100 mg Oral Nightly    gabapentin  300 mg Oral BID    metoprolol succinate  50 mg Oral BID    sodium chloride flush  10 mL Intravenous 2 times per day    aspirin  81 mg Oral Daily    rosuvastatin  20 mg Oral Nightly    melatonin  10 mg Oral Nightly    insulin lispro  0-12 Units Subcutaneous TID WC    insulin lispro  0-6 Units Subcutaneous Nightly     Continuous Infusions:   heparin (PORCINE) Infusion 11.186 Units/kg/hr (01/18/21 0120)    dextrose       PRN Meds:.perflutren lipid microspheres, sodium chloride flush, acetaminophen, heparin (porcine), heparin (porcine), glucose, dextrose, glucagon (rDNA), dextrose    REVIEW OF SYSTEMS:     No pruritus, rash, bruising. Cardiac symptoms per HPI. No nausea, vomiting, abdominal pain, GI bleeding, change in bowel habits. No dysuria, urinary frequency, urgency, hematuria, flank pain. Joint pain but no muscle soreness, stiffness, aching. No headache, speech disturbance, lateralizing neurologic deficit. No hemoptysis, epistaxis, easy bruising. No anxiety, depression. No symptoms of hypothyroidism, hyperthyroidism, diabetes, heat or cold intolerance. FAMILY HISTORY: Negative for CAD in first-degree relatives.     SOCIAL HISTORY: Negative for alcohol, tobacco, or

## 2021-01-18 NOTE — PROGRESS NOTES
CC: MVCAD    Brief HPI:  Patient seen with Dr Crow Layton Awake, alert. No complaints.       Past Medical History:   Diagnosis Date    Arthritis     CAD (coronary artery disease) 1/16/2021    Cancer ([facility]     COPD (chronic obstructive pulmonary disease) ([facility]     Diabetes mellitus ([facility]     HTN (hypertension) 1/16/2021    Hx of blood clots     Hyperlipidemia      Past Surgical History:   Procedure Laterality Date    BACK SURGERY  1995    CHOLECYSTECTOMY  2003    SKIN CANCER EXCISION       Social History     Socioeconomic History    Marital status: Unknown     Spouse name: Not on file    Number of children: Not on file    Years of education: Not on file    Highest education level: Not on file   Occupational History    Not on file   Social Needs    Financial resource strain: Not on file    Food insecurity     Worry: Not on file     Inability: Not on file    Transportation needs     Medical: Not on file     Non-medical: Not on file   Tobacco Use    Smoking status: Former Smoker    Smokeless tobacco: Never Used   Substance and Sexual Activity    Alcohol use: Not on file    Drug use: Not on file    Sexual activity: Not on file   Lifestyle    Physical activity     Days per week: Not on file     Minutes per session: Not on file    Stress: Not on file   Relationships    Social connections     Talks on phone: Not on file     Gets together: Not on file     Attends Anabaptism service: Not on file     Active member of club or organization: Not on file     Attends meetings of clubs or organizations: Not on file     Relationship status: Not on file    Intimate partner violence     Fear of current or ex partner: Not on file     Emotionally abused: Not on file     Physically abused: Not on file     Forced sexual activity: Not on file   Other Topics Concern    Not on file   Social History Narrative    Not on file     Family History   Problem Relation Age of Onset    Diabetes Mother        Vitals:    01/17/21 0830 01/17/21 2009 01/17/21 2315 01/18/21 0444   BP: (!) 155/70 131/67 135/79 101/61   Pulse: 79 83 77 75   Resp: 18 18 16 16   Temp: 97.9 °F (36.6 °C) 98.1 °F (36.7 °C)  97.5 °F (36.4 °C)   TempSrc: Temporal Temporal  Temporal   SpO2: 93% 93%     Weight:       Height:             No intake or output data in the 24 hours ending 01/18/21 0928      Recent Labs     01/16/21  0529 01/17/21  0539   WBC 7.0 5.7   HGB 12.4* 13.1   HCT 37.3 39.1    192      Recent Labs     01/16/21  0529   BUN 12   CREATININE 0.7         ROS:   Negative for CP, palpitations, SOB at rest, dizziness/lightheadedness. Physical Exam   Constitutional: Oriented to person, place, and time. Appears well-developed. No distress. Cardiovascular: Normal rate, regular rhythm and normal heart sounds. Pulmonary/Chest: Effort normal. No respiratory distress. Abdominal: Soft. Bowel sounds are normal.   Musculoskeletal: Normal range of motion. Neurological: alert and oriented to person, place, and time. Skin: Skin is warm and dry. Psychiatric: normal mood and affect. A/P:  1) MVCAD  - Patient now agreeable to surgery  - There is some confusion as to whether patient received/took plavix prior to heart cath ( not documented)  Will check Teg and Platelet mapping today   - Pre op studies underway  - Patient did have covid 11/20- since recovered, CT scan does show small right pleural effusion, PFTs ordered, will follow  - Plan is for OR this week, Wed vs Fri  - All R/B/I discused        Note: 25 minutes was spent providing face-to-face patient care, including:  and coordinating care, reviewing the chart, labs, and diagnostics, as well as medical decision making. Greater than 50% of this time was spent instructing and counseling the patient face to face regarding findings and recommendations.

## 2021-01-18 NOTE — PROGRESS NOTES
Hospitalist Progress Note      PCP: WILVER Campbell NP    Date of Admission: 1/15/2021    Chief Complaint: chest pain    Hospital Course:   68 y.o. male with PMH of cancer, COPD, DM, blood clots, HLD and Covid who presented to [address] [place] [address] with chest pain. He states he had Covid back in November and during that time was believed to have had a non-STEMI. He presented to the hospital today for an elective cardiac cath. 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 CTS has been consulted. He will started on statin and BB. The patient states he has intermittent chest pain radiating to his back, states it is 6/10 and dull. He does not smoke but uses smokeless tobacco  1/16: seen by CTS, patient is refusing surgery at this time-- encouraged to speak with family. BP improved. BGL stable. The patient is considering the surgery, but would like to have CTS speak with daughter tomorrow morning on phone when they round. He did voice some of his concerns with me about the surgery. He doesn't like staying in hospitals, he's concerned about how long he will be inpatient, he's concerned he won't be able to go home and take care of himself, he's concerned about pain from surgery. I have attempted to call his daughter several times without any answer on her phone. 1/17: seen by cardiology and CTS, patient now considering CABG. preop workup continues. The patient voiced his biggest concern is post-op pain. 1/18: labs stable. Vitals stable. Patient is agreeable to proceed with CABG--possibly Wed or Fri (1/20 or 1/22)    Subjective:    Patient sleeping, he wakens easily. He has no complaints at the present time.       Medications:  Reviewed    Infusion Medications    heparin (PORCINE) Infusion 11.186 Units/kg/hr (01/18/21 0120)    dextrose       Scheduled Medications    isosorbide mononitrate  60 mg Oral Daily    amLODIPine  10 mg Oral Daily    docusate sodium  100 mg Oral Nightly    gabapentin  300 mg Oral BID    metoprolol succinate  50 mg Oral BID    sodium chloride flush  10 mL Intravenous 2 times per day    aspirin  81 mg Oral Daily    rosuvastatin  20 mg Oral Nightly    melatonin  10 mg Oral Nightly    insulin lispro  0-12 Units Subcutaneous TID     insulin lispro  0-6 Units Subcutaneous Nightly     PRN Meds: perflutren lipid microspheres, sodium chloride flush, acetaminophen, heparin (porcine), heparin (porcine), glucose, dextrose, glucagon (rDNA), dextrose    No intake or output data in the 24 hours ending 01/18/21 0921    Exam:    /61   Pulse 75   Temp 97.5 °F (36.4 °C) (Temporal)   Resp 16   Ht 5' 7\" (1.702 m)   Wt 195 lb (88.5 kg)   SpO2 93%   BMI 30.54 kg/m²     General appearance: No apparent distress, appears stated age and cooperative. HEENT: Pupils equal, round, and reactive to light. Conjunctivae/corneas clear. Neck: Supple, with full range of motion. No jugular venous distention. Trachea midline. Respiratory:  Normal respiratory effort. Clear to auscultation, bilaterally without Rales/Wheezes/Rhonchi. Cardiovascular: Regular rate and rhythm with normal S1/S2 without murmurs, rubs or gallops. Abdomen: Soft, non-tender, non-distended with normal bowel sounds. Musculoskeletal: No clubbing, cyanosis or edema bilaterally. Full range of motion without deformity. Skin: Skin color, texture, turgor normal.  No rashes or lesions. Neurologic:  Neurovascularly intact without any focal sensory/motor deficits.    Psychiatric: Alert and oriented, thought content appropriate, normal insight  Capillary Refill: Brisk,< 3 seconds   Peripheral Pulses: +2 palpable, equal bilaterally       Labs:   Recent Labs     01/16/21  0529 01/17/21  0539   WBC 7.0 5.7   HGB 12.4* 13.1   HCT 37.3 39.1    192     Recent Labs     01/16/21  0529      K 4.3      CO2 21*   BUN 12   CREATININE 0.7   CALCIUM 8.8     No results for input(s): AST, ALT, BILIDIR, BILITOT, ALKPHOS in the last 72 hours. No results for input(s): INR in the last 72 hours. No results for input(s): Crystal Hortencia in the last 72 hours. US DUP LOWER EXTREMITY MAPPING BILAT VENOUS   Final Result   Bilateral venous mapping as described. No greater   saphenous vein venous thrombosis visualized. US CAROTID ARTERY BILATERAL   Final Result   Atherosclerotic disease. No hemodynamically significant stenosis is   identified   Estimated stenosis by NASCET criteria in the proximal right carotid   artery is between 0% and 49%. Estimated stenosis by NASCET criteria in the proximal left carotid   artery is between 0% and 49%. CT CHEST WO CONTRAST   Final Result   1. Loculated right pleural effusion with adjacent subsegmental atelectasis. 2.  No evidence of pneumonia. 3.  Atherosclerotic calcifications associated with the coronary arteries.       VL NED BILATERAL LIMITED 1-2 LEVELS    (Results Pending)       Assessment/Plan:    Active Hospital Problems    Diagnosis Date Noted    COPD (chronic obstructive [facility] disease) ([facility] [J44.9] 01/16/2021    HTN (hypertension) [I10] 01/16/2021    Type 2 diabetes mellitus ([facility] [E11.9] 01/16/2021    HLD (hyperlipidemia) [E78.5] 01/16/2021    CAD (coronary artery disease) [I25.10] 01/16/2021    Stable angina pectoris ([facility] [I20.8] 01/15/2021     Plan  Recommendations per CTS  Recommendations per cardiology  BP management  BGL management  Daily weights  I/Os  Neurovascular checks  preop workup  CABG later this week    DVT Prophylaxis: heparin  Diet: DIET LOW SODIUM 2 GM; Carb Control: 4 carb choices (60 gms)/meal  Code Status: Full Code    PT/OT Eval Status: n/a    Dispo - admit telemetry    Carole Virk CNP

## 2021-01-19 LAB
APTT: 58.7 SEC (ref 24.5–35.1)
HCT VFR BLD CALC: 36.1 % (ref 37–54)
HEMOGLOBIN: 12.1 G/DL (ref 12.5–16.5)
MCH RBC QN AUTO: 29.2 PG (ref 26–35)
MCHC RBC AUTO-ENTMCNC: 33.5 % (ref 32–34.5)
MCV RBC AUTO: 87 FL (ref 80–99.9)
METER GLUCOSE: 177 MG/DL (ref 74–99)
METER GLUCOSE: 181 MG/DL (ref 74–99)
METER GLUCOSE: 187 MG/DL (ref 74–99)
METER GLUCOSE: 257 MG/DL (ref 74–99)
PDW BLD-RTO: 14.3 FL (ref 11.5–15)
PLATELET # BLD: 181 E9/L (ref 130–450)
PMV BLD AUTO: 9.4 FL (ref 7–12)
RBC # BLD: 4.15 E12/L (ref 3.8–5.8)
WBC # BLD: 6.2 E9/L (ref 4.5–11.5)

## 2021-01-19 PROCEDURE — 6370000000 HC RX 637 (ALT 250 FOR IP): Performed by: CLINICAL NURSE SPECIALIST

## 2021-01-19 PROCEDURE — 6370000000 HC RX 637 (ALT 250 FOR IP): Performed by: INTERNAL MEDICINE

## 2021-01-19 PROCEDURE — 36415 COLL VENOUS BLD VENIPUNCTURE: CPT

## 2021-01-19 PROCEDURE — 2140000000 HC CCU INTERMEDIATE R&B

## 2021-01-19 PROCEDURE — 85730 THROMBOPLASTIN TIME PARTIAL: CPT

## 2021-01-19 PROCEDURE — 2580000003 HC RX 258: Performed by: INTERNAL MEDICINE

## 2021-01-19 PROCEDURE — 85027 COMPLETE CBC AUTOMATED: CPT

## 2021-01-19 PROCEDURE — 6370000000 HC RX 637 (ALT 250 FOR IP): Performed by: FAMILY MEDICINE

## 2021-01-19 PROCEDURE — 6360000002 HC RX W HCPCS: Performed by: INTERNAL MEDICINE

## 2021-01-19 PROCEDURE — 82962 GLUCOSE BLOOD TEST: CPT

## 2021-01-19 RX ORDER — INSULIN GLARGINE 100 [IU]/ML
10 INJECTION, SOLUTION SUBCUTANEOUS NIGHTLY
Status: DISCONTINUED | OUTPATIENT
Start: 2021-01-19 | End: 2021-01-22

## 2021-01-19 RX ADMIN — Medication 10 ML: at 22:00

## 2021-01-19 RX ADMIN — METOPROLOL SUCCINATE 50 MG: 50 TABLET, EXTENDED RELEASE ORAL at 10:00

## 2021-01-19 RX ADMIN — AMLODIPINE BESYLATE 10 MG: 10 TABLET ORAL at 10:00

## 2021-01-19 RX ADMIN — GABAPENTIN 300 MG: 300 CAPSULE ORAL at 10:00

## 2021-01-19 RX ADMIN — METOPROLOL SUCCINATE 50 MG: 50 TABLET, EXTENDED RELEASE ORAL at 21:59

## 2021-01-19 RX ADMIN — INSULIN GLARGINE 10 UNITS: 100 INJECTION, SOLUTION SUBCUTANEOUS at 22:02

## 2021-01-19 RX ADMIN — ASPIRIN 81 MG: 81 TABLET, CHEWABLE ORAL at 13:17

## 2021-01-19 RX ADMIN — ISOSORBIDE MONONITRATE 60 MG: 60 TABLET ORAL at 13:17

## 2021-01-19 RX ADMIN — ROSUVASTATIN 20 MG: 20 TABLET, FILM COATED ORAL at 21:59

## 2021-01-19 RX ADMIN — DOCUSATE SODIUM 100 MG: 100 CAPSULE ORAL at 21:59

## 2021-01-19 RX ADMIN — INSULIN LISPRO 3 UNITS: 100 INJECTION, SOLUTION INTRAVENOUS; SUBCUTANEOUS at 18:23

## 2021-01-19 RX ADMIN — GABAPENTIN 300 MG: 300 CAPSULE ORAL at 21:59

## 2021-01-19 RX ADMIN — HEPARIN SODIUM 11.19 UNITS/KG/HR: 10000 INJECTION, SOLUTION INTRAVENOUS at 02:39

## 2021-01-19 RX ADMIN — Medication 10 ML: at 13:18

## 2021-01-19 RX ADMIN — Medication 10 MG: at 22:00

## 2021-01-19 ASSESSMENT — PAIN SCALES - GENERAL
PAINLEVEL_OUTOF10: 0
PAINLEVEL_OUTOF10: 0

## 2021-01-19 NOTE — PROGRESS NOTES
DAILY PROGRESS NOTE - [facility]    SUBJECTIVE:    He is being followed for surgical coronary disease. Have now agreeable for surgery which may be done either tomorrow or Friday. No chest pain, worsening dyspnea, palpitations, syncope, presyncope. Sinus rhythm on telemetry. Echo showed LVEF 92%, stage I diastolic dysfunction, trace valvular regurgitation. OBJECTIVE:    His vital signs were reviewed today. Vitals:    01/19/21 0505   BP: (!) 123/58   Pulse: 80   Resp: 16   Temp: 97.9 °F (36.6 °C)   SpO2: 96%       Scheduled Meds:   isosorbide mononitrate  60 mg Oral Daily    insulin lispro  0-18 Units Subcutaneous TID WC    insulin lispro  0-9 Units Subcutaneous Nightly    amLODIPine  10 mg Oral Daily    docusate sodium  100 mg Oral Nightly    gabapentin  300 mg Oral BID    metoprolol succinate  50 mg Oral BID    sodium chloride flush  10 mL Intravenous 2 times per day    aspirin  81 mg Oral Daily    rosuvastatin  20 mg Oral Nightly    melatonin  10 mg Oral Nightly     Continuous Infusions:   heparin (PORCINE) Infusion 11.186 Units/kg/hr (01/19/21 0621)    dextrose       PRN Meds:.perflutren lipid microspheres, sodium chloride flush, acetaminophen, heparin (porcine), heparin (porcine), glucose, dextrose, glucagon (rDNA), dextrose    REVIEW OF SYSTEMS:     No pruritus, rash, bruising. Cardiac symptoms per HPI. No nausea, vomiting, abdominal pain, GI bleeding, change in bowel habits. No dysuria, urinary frequency, urgency, hematuria, flank pain. Joint pain but no muscle soreness, stiffness, aching. No headache, speech disturbance, lateralizing neurologic deficit. No hemoptysis, epistaxis, easy bruising. No anxiety, depression. No symptoms of hypothyroidism, hyperthyroidism, diabetes, heat or cold intolerance. FAMILY HISTORY: Negative for CAD in first-degree relatives. SOCIAL HISTORY: Negative for alcohol, tobacco, or illicit drug use.       PHYSICAL EXAM:    General Appearance: awake, alert, oriented, in no acute distress  Neck:  no bruits  Lungs:  Normal expansion. Clear to auscultation. No rales, rhonchi, or wheezing. Heart:  Heart sounds are normal.  Regular rate and rhythm without murmur, gallop or rub. Abdomen:  Soft, non-tender. Extremities: Extremities warm to touch, pink, with no edema. Neuro/musculosketal:  Unremarkable. LABS:    No results for input(s): NA, CREATININE in the last 72 hours. Invalid input(s):  K,  BUN    Recent Labs     01/17/21  0539 01/19/21  0600   HGB 13.1 12.1*       No results for input(s): INR in the last 72 hours. IMPRESSION:    #1.  Recent non-STEMI with surgical disease-CABG planned for later this week per CT surgery. Continue aspirin and stop heparin infusion when okay with surgical team.    #2. Hypertension-blood pressure controlled. No new antihypertensives added. #3. Hyperlipidemia-statin. #4.  Diabetes-per hospitalist.    #5.  Continue to follow.

## 2021-01-19 NOTE — PROGRESS NOTES
CC: MVCAD    Brief HPI:  Patient discussed with Dr. Marie Norris.      Past Medical History:   Diagnosis Date    Arthritis     CAD (coronary artery disease) 1/16/2021    [facility] ([facility]     COPD [facility]chronic obstructive pulmonary disease) ([facility]     Diabetes mellitus ([facility]     HTN (hypertension) 1/16/2021    Hx of blood clots     Hyperlipidemia      Past Surgical History:   Procedure Laterality Date    BACK SURGERY  1995    CHOLECYSTECTOMY  2003    SKIN CANCER EXCISION       Social History     Socioeconomic History    Marital status: Unknown     Spouse name: Not on file    Number of children: Not on file    Years of education: Not on file    Highest education level: Not on file   Occupational History    Not on file   Social Needs    Financial resource strain: Not on file    Food insecurity     Worry: Not on file     Inability: Not on file    Transportation needs     Medical: Not on file     Non-medical: Not on file   Tobacco Use    Smoking status: Former Smoker    Smokeless tobacco: Never Used   Substance and Sexual Activity    Alcohol use: Not on file    Drug use: Not on file    Sexual activity: Not on file   Lifestyle    Physical activity     Days per week: Not on file     Minutes per session: Not on file    Stress: Not on file   Relationships    Social connections     Talks on phone: Not on file     Gets together: Not on file     Attends Spiritism service: Not on file     Active member of club or organization: Not on file     Attends meetings of clubs or organizations: Not on file     Relationship status: Not on file    Intimate partner violence     Fear of current or ex partner: Not on file     Emotionally abused: Not on file     Physically abused: Not on file     Forced sexual activity: Not on file   Other Topics Concern    Not on file   Social History Narrative    Not on file     Family History   Problem Relation Age of Onset    Diabetes Mother        Vitals:    01/18/21 2000 01/18/21 5921 01/19/21 0505 01/19/21 0800   BP: 106/63 (!) 108/58 (!) 123/58 134/63   Pulse: 86 83 80 76   Resp: 16 16 16 18   Temp: 98.2 °F (36.8 °C) 97.8 °F (36.6 °C) 97.9 °F (36.6 °C) 97.4 °F (36.3 °C)   TempSrc: Temporal Temporal Temporal Temporal   SpO2: 95% 95% 96% 93%   Weight:       Height:               Intake/Output Summary (Last 24 hours) at 1/19/2021 1522  Last data filed at 1/19/2021 2326  Gross per 24 hour   Intake 358.8 ml   Output 450 ml   Net -91.2 ml         Recent Labs     01/17/21  0539 01/19/21  0600   WBC 5.7 6.2   HGB 13.1 12.1*   HCT 39.1 36.1*    181      No results for input(s): BUN, CREATININE in the last 72 hours. ROS:   Negative for CP, palpitations, SOB at rest, dizziness/lightheadedness. Physical Exam   Constitutional: Oriented to person, place, and time. Appears well-developed. No distress. Cardiovascular: Normal rate, regular rhythm and normal heart sounds. Pulmonary/Chest: Effort normal. No respiratory distress. Abdominal: Soft. Bowel sounds are normal.   Skin: Skin is warm and dry. Psychiatric: normal mood and affect. A/P:  1) MVCAD  - Patient now agreeable to surgery  - There is some confusion as to whether patient received/took plavix prior to heart cath (not documented)- Teg and Platelet mapping yesterday  - Pre-op studies completed   - Patient did have covid 11/20- since recovered, CT scan does show small right pleural effusion, PFTs completed   - Plan is OR on Friday  - All R/B/I discused        Note: 25 minutes was spent providing face-to-face patient care, including:  and coordinating care, reviewing the chart, labs, and diagnostics, as well as medical decision making. Greater than 50% of this time was spent instructing and counseling the patient face to face regarding findings and recommendations.

## 2021-01-19 NOTE — PROGRESS NOTES
Hospitalist Progress Note      PCP: WILVER Willis NP    Date of Admission: 1/15/2021    Chief Complaint: chest pain    Hospital Course:   68 y.o. male with PMH of cancer, COPD, DM, blood clots, HLD and Covid who presented to [facility] with chest pain. He states he had Covid back in November and during that time was believed to have had a non-STEMI. He presented to the hospital today for an elective cardiac cath. 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 CTS has been consulted. He will started on statin and BB. The patient states he has intermittent chest pain radiating to his back, states it is 6/10 and dull. He does not smoke but uses smokeless tobacco  1/16: seen by CTS, patient is refusing surgery at this time-- encouraged to speak with family. BP improved. BGL stable. The patient is considering the surgery, but would like to have CTS speak with daughter tomorrow morning on phone when they round. He did voice some of his concerns with me about the surgery. He doesn't like staying in hospitals, he's concerned about how long he will be inpatient, he's concerned he won't be able to go home and take care of himself, he's concerned about pain from surgery. I have attempted to call his daughter several times without any answer on her phone. 1/17: seen by cardiology and CTS, patient now considering CABG. preop workup continues. The patient voiced his biggest concern is post-op pain. 1/18: labs stable. Vitals stable. Patient is agreeable to proceed with CABG--possibly Wed or Fri (1/20 or 1/22)  1/19: BGL remains elevated on high dose ISS, will add lantus 10 units at night while inpatient. A1c 6.6 on 1/16. Continues on heparin gtt. Subjective:    Patient sleeping in bed. He is easily aroused. He has no complaints at the present time.        Medications:  Reviewed    Infusion Medications    heparin (PORCINE) Infusion 11.186 Units/kg/hr (01/19/21 6499)    dextrose Scheduled Medications    insulin glargine  10 Units Subcutaneous Nightly    isosorbide mononitrate  60 mg Oral Daily    insulin lispro  0-18 Units Subcutaneous TID WC    insulin lispro  0-9 Units Subcutaneous Nightly    amLODIPine  10 mg Oral Daily    docusate sodium  100 mg Oral Nightly    gabapentin  300 mg Oral BID    metoprolol succinate  50 mg Oral BID    sodium chloride flush  10 mL Intravenous 2 times per day    aspirin  81 mg Oral Daily    rosuvastatin  20 mg Oral Nightly    melatonin  10 mg Oral Nightly     PRN Meds: perflutren lipid microspheres, sodium chloride flush, acetaminophen, heparin (porcine), heparin (porcine), glucose, dextrose, glucagon (rDNA), dextrose      Intake/Output Summary (Last 24 hours) at 1/19/2021 0916  Last data filed at 1/19/2021 1363  Gross per 24 hour   Intake 358.8 ml   Output 450 ml   Net -91.2 ml       Exam:    BP (!) 123/58   Pulse 80   Temp 97.9 °F (36.6 °C) (Temporal)   Resp 16   Ht 5' 7\" (1.702 m)   Wt 195 lb (88.5 kg)   SpO2 96%   BMI 30.54 kg/m²     General appearance: No apparent distress, appears stated age and cooperative. HEENT: Pupils equal, round, and reactive to light. Conjunctivae/corneas clear. Neck: Supple, with full range of motion. No jugular venous distention. Trachea midline. Respiratory:  Normal respiratory effort. Clear to auscultation, bilaterally without Rales/Wheezes/Rhonchi. Cardiovascular: Regular rate and rhythm with normal S1/S2 without murmurs, rubs or gallops. Abdomen: Soft, non-tender, non-distended with normal bowel sounds. Musculoskeletal: No clubbing, cyanosis or edema bilaterally. Full range of motion without deformity. Skin: Skin color, texture, turgor normal.  No rashes or lesions. Neurologic:  Neurovascularly intact without any focal sensory/motor deficits.    Psychiatric: Alert and oriented, thought content appropriate, normal insight  Capillary Refill: Brisk,< 3 seconds   Peripheral Pulses: +2 palpable, equal bilaterally       Labs:   Recent Labs     01/17/21  0539 01/19/21  0600   WBC 5.7 6.2   HGB 13.1 12.1*   HCT 39.1 36.1*    181     No results for input(s): NA, K, CL, CO2, BUN, CREATININE, CALCIUM, PHOS in the last 72 hours. Invalid input(s): MAGNES  No results for input(s): AST, ALT, BILIDIR, BILITOT, ALKPHOS in the last 72 hours. No results for input(s): INR in the last 72 hours. No results for input(s): Geofm Squibb in the last 72 hours. VL NED BILATERAL LIMITED 1-2 LEVELS   Final Result      US DUP LOWER EXTREMITY MAPPING BILAT VENOUS   Final Result   Bilateral venous mapping as described. No greater   saphenous vein venous thrombosis visualized. US CAROTID ARTERY BILATERAL   Final Result   Atherosclerotic disease. No hemodynamically significant stenosis is   identified   Estimated stenosis by NASCET criteria in the proximal right carotid   artery is between 0% and 49%. Estimated stenosis by NASCET criteria in the proximal left carotid   artery is between 0% and 49%. CT CHEST WO CONTRAST   Final Result   1. Loculated right pleural effusion with adjacent subsegmental atelectasis. 2.  No evidence of pneumonia. 3.  Atherosclerotic calcifications associated with the coronary arteries.           Assessment/Plan:    Active Hospital Problems    Diagnosis Date Noted    COPD (chronic obstructive pulmonary disease) ([facility] [J44.9] 01/16/2021    HTN (hypertension) [I10] 01/16/2021    Type 2 diabetes mellitus ([facility] [E11.9] 01/16/2021    HLD (hyperlipidemia) [E78.5] 01/16/2021    CAD (coronary artery disease) [I25.10] 01/16/2021    Stable angina pectoris ([facility] [I20.8] 01/15/2021     Plan  Recommendations per CTS  Recommendations per cardiology  BP management  BGL management  Daily weights  I/Os  Neurovascular checks  CABG later this week    DVT Prophylaxis: heparin  Diet: DIET LOW SODIUM 2 GM; Carb Control: 4 carb choices (60 gms)/meal  Code Status: Full Code    PT/OT Eval Status: n/a    Dispo - admit telemetry    Yfn Painter CNP

## 2021-01-20 LAB
APTT: 59.3 SEC (ref 24.5–35.1)
B.E.: -3.3 MMOL/L (ref -3–3)
COHB: 0.9 % (ref 0–1.5)
CRITICAL: ABNORMAL
DATE ANALYZED: ABNORMAL
DATE OF COLLECTION: ABNORMAL
HCO3: 21.2 MMOL/L (ref 22–26)
HCT VFR BLD CALC: 36.1 % (ref 37–54)
HEMOGLOBIN: 11.9 G/DL (ref 12.5–16.5)
HHB: 6.4 % (ref 0–5)
LAB: ABNORMAL
Lab: ABNORMAL
MCH RBC QN AUTO: 28.8 PG (ref 26–35)
MCHC RBC AUTO-ENTMCNC: 33 % (ref 32–34.5)
MCV RBC AUTO: 87.4 FL (ref 80–99.9)
METER GLUCOSE: 158 MG/DL (ref 74–99)
METER GLUCOSE: 181 MG/DL (ref 74–99)
METER GLUCOSE: 189 MG/DL (ref 74–99)
METER GLUCOSE: 330 MG/DL (ref 74–99)
METHB: 0.3 % (ref 0–1.5)
MODE: ABNORMAL
O2 CONTENT: 16.7 ML/DL
O2 SATURATION: 93.5 % (ref 92–98.5)
O2HB: 92.4 % (ref 94–97)
OPERATOR ID: 359
PATIENT TEMP: 37 C
PCO2: 36.4 MMHG (ref 35–45)
PDW BLD-RTO: 14.1 FL (ref 11.5–15)
PH BLOOD GAS: 7.38 (ref 7.35–7.45)
PLATELET # BLD: 194 E9/L (ref 130–450)
PMV BLD AUTO: 9 FL (ref 7–12)
PO2: 70.4 MMHG (ref 75–100)
RBC # BLD: 4.13 E12/L (ref 3.8–5.8)
SOURCE, BLOOD GAS: ABNORMAL
THB: 12.8 G/DL (ref 11.5–16.5)
TIME ANALYZED: 1125
WBC # BLD: 7 E9/L (ref 4.5–11.5)

## 2021-01-20 PROCEDURE — 6360000002 HC RX W HCPCS: Performed by: INTERNAL MEDICINE

## 2021-01-20 PROCEDURE — 82805 BLOOD GASES W/O2 SATURATION: CPT

## 2021-01-20 PROCEDURE — 6370000000 HC RX 637 (ALT 250 FOR IP): Performed by: CLINICAL NURSE SPECIALIST

## 2021-01-20 PROCEDURE — 85027 COMPLETE CBC AUTOMATED: CPT

## 2021-01-20 PROCEDURE — 85730 THROMBOPLASTIN TIME PARTIAL: CPT

## 2021-01-20 PROCEDURE — 2580000003 HC RX 258: Performed by: INTERNAL MEDICINE

## 2021-01-20 PROCEDURE — 82962 GLUCOSE BLOOD TEST: CPT

## 2021-01-20 PROCEDURE — 6370000000 HC RX 637 (ALT 250 FOR IP): Performed by: INTERNAL MEDICINE

## 2021-01-20 PROCEDURE — 36415 COLL VENOUS BLD VENIPUNCTURE: CPT

## 2021-01-20 PROCEDURE — 2140000000 HC CCU INTERMEDIATE R&B

## 2021-01-20 PROCEDURE — 94640 AIRWAY INHALATION TREATMENT: CPT

## 2021-01-20 PROCEDURE — 99223 1ST HOSP IP/OBS HIGH 75: CPT | Performed by: INTERNAL MEDICINE

## 2021-01-20 PROCEDURE — 6370000000 HC RX 637 (ALT 250 FOR IP): Performed by: FAMILY MEDICINE

## 2021-01-20 RX ORDER — BUDESONIDE 0.5 MG/2ML
0.5 INHALANT ORAL 2 TIMES DAILY
Status: DISCONTINUED | OUTPATIENT
Start: 2021-01-20 | End: 2021-02-01 | Stop reason: HOSPADM

## 2021-01-20 RX ORDER — BETAMETHASONE DIPROPIONATE 0.5 MG/G
CREAM TOPICAL 2 TIMES DAILY
Status: DISCONTINUED | OUTPATIENT
Start: 2021-01-20 | End: 2021-01-29

## 2021-01-20 RX ORDER — METHYLPREDNISOLONE SODIUM SUCCINATE 40 MG/ML
40 INJECTION, POWDER, LYOPHILIZED, FOR SOLUTION INTRAMUSCULAR; INTRAVENOUS EVERY 8 HOURS
Status: DISCONTINUED | OUTPATIENT
Start: 2021-01-20 | End: 2021-01-23

## 2021-01-20 RX ORDER — BISACODYL 10 MG
10 SUPPOSITORY, RECTAL RECTAL DAILY PRN
Status: DISCONTINUED | OUTPATIENT
Start: 2021-01-20 | End: 2021-01-23

## 2021-01-20 RX ORDER — ARFORMOTEROL TARTRATE 15 UG/2ML
15 SOLUTION RESPIRATORY (INHALATION) 2 TIMES DAILY
Status: DISCONTINUED | OUTPATIENT
Start: 2021-01-20 | End: 2021-01-23

## 2021-01-20 RX ORDER — IPRATROPIUM BROMIDE AND ALBUTEROL SULFATE 2.5; .5 MG/3ML; MG/3ML
1 SOLUTION RESPIRATORY (INHALATION)
Status: DISCONTINUED | OUTPATIENT
Start: 2021-01-20 | End: 2021-01-22

## 2021-01-20 RX ADMIN — BISACODYL 10 MG: 10 SUPPOSITORY RECTAL at 22:42

## 2021-01-20 RX ADMIN — IPRATROPIUM BROMIDE AND ALBUTEROL SULFATE 1 AMPULE: .5; 3 SOLUTION RESPIRATORY (INHALATION) at 19:48

## 2021-01-20 RX ADMIN — METOPROLOL SUCCINATE 50 MG: 50 TABLET, EXTENDED RELEASE ORAL at 22:19

## 2021-01-20 RX ADMIN — METOPROLOL SUCCINATE 50 MG: 50 TABLET, EXTENDED RELEASE ORAL at 10:50

## 2021-01-20 RX ADMIN — IPRATROPIUM BROMIDE AND ALBUTEROL SULFATE 1 AMPULE: .5; 3 SOLUTION RESPIRATORY (INHALATION) at 15:41

## 2021-01-20 RX ADMIN — DOCUSATE SODIUM 100 MG: 100 CAPSULE ORAL at 22:19

## 2021-01-20 RX ADMIN — Medication 10 MG: at 22:20

## 2021-01-20 RX ADMIN — ROSUVASTATIN 20 MG: 20 TABLET, FILM COATED ORAL at 22:20

## 2021-01-20 RX ADMIN — METHYLPREDNISOLONE SODIUM SUCCINATE 40 MG: 40 INJECTION, POWDER, FOR SOLUTION INTRAMUSCULAR; INTRAVENOUS at 22:42

## 2021-01-20 RX ADMIN — INSULIN LISPRO 3 UNITS: 100 INJECTION, SOLUTION INTRAVENOUS; SUBCUTANEOUS at 17:46

## 2021-01-20 RX ADMIN — INSULIN LISPRO 12 UNITS: 100 INJECTION, SOLUTION INTRAVENOUS; SUBCUTANEOUS at 13:00

## 2021-01-20 RX ADMIN — AMLODIPINE BESYLATE 10 MG: 10 TABLET ORAL at 10:50

## 2021-01-20 RX ADMIN — BUDESONIDE 500 MCG: 0.5 SUSPENSION RESPIRATORY (INHALATION) at 19:48

## 2021-01-20 RX ADMIN — Medication 10 ML: at 22:20

## 2021-01-20 RX ADMIN — INSULIN GLARGINE 10 UNITS: 100 INJECTION, SOLUTION SUBCUTANEOUS at 22:25

## 2021-01-20 RX ADMIN — BETAMETHASONE DIPROPIONATE: 0.5 CREAM TOPICAL at 22:21

## 2021-01-20 RX ADMIN — ISOSORBIDE MONONITRATE 60 MG: 60 TABLET ORAL at 10:50

## 2021-01-20 RX ADMIN — INSULIN LISPRO 2 UNITS: 100 INJECTION, SOLUTION INTRAVENOUS; SUBCUTANEOUS at 22:25

## 2021-01-20 RX ADMIN — ARFORMOTEROL TARTRATE 15 MCG: 15 SOLUTION RESPIRATORY (INHALATION) at 19:48

## 2021-01-20 RX ADMIN — HEPARIN SODIUM 11.19 UNITS/KG/HR: 10000 INJECTION, SOLUTION INTRAVENOUS at 04:28

## 2021-01-20 RX ADMIN — GABAPENTIN 300 MG: 300 CAPSULE ORAL at 22:20

## 2021-01-20 RX ADMIN — GABAPENTIN 300 MG: 300 CAPSULE ORAL at 10:50

## 2021-01-20 RX ADMIN — INSULIN LISPRO 3 UNITS: 100 INJECTION, SOLUTION INTRAVENOUS; SUBCUTANEOUS at 08:00

## 2021-01-20 RX ADMIN — ASPIRIN 81 MG: 81 TABLET, CHEWABLE ORAL at 10:50

## 2021-01-20 ASSESSMENT — PAIN SCALES - GENERAL
PAINLEVEL_OUTOF10: 0
PAINLEVEL_OUTOF10: 0

## 2021-01-20 NOTE — CARE COORDINATION
1/20/2021 - Cardiology and Cardiothoracic surgery following. For CABG on Friday 1/20/2021. Plan is to return home when medically stable. SW/CM will follow.

## 2021-01-20 NOTE — PROGRESS NOTES
DAILY PROGRESS NOTE - [facility]    SUBJECTIVE:    He is being followed for surgical coronary disease. Surgery scheduled for this Friday. No chest pain, worsening dyspnea, palpitations, syncope, presyncope. Sinus rhythm on telemetry. Echo showed LVEF 82%, stage I diastolic dysfunction, trace valvular regurgitation. OBJECTIVE:    His vital signs were reviewed today. Vitals:    01/19/21 2320   BP: 131/60   Pulse: 84   Resp: 16   Temp: 98.1 °F (36.7 °C)   SpO2: 95%       Scheduled Meds:   insulin glargine  10 Units Subcutaneous Nightly    isosorbide mononitrate  60 mg Oral Daily    insulin lispro  0-18 Units Subcutaneous TID WC    insulin lispro  0-9 Units Subcutaneous Nightly    amLODIPine  10 mg Oral Daily    docusate sodium  100 mg Oral Nightly    gabapentin  300 mg Oral BID    metoprolol succinate  50 mg Oral BID    sodium chloride flush  10 mL Intravenous 2 times per day    aspirin  81 mg Oral Daily    rosuvastatin  20 mg Oral Nightly    melatonin  10 mg Oral Nightly     Continuous Infusions:   heparin (PORCINE) Infusion 11.186 Units/kg/hr (01/20/21 0653)    dextrose       PRN Meds:.perflutren lipid microspheres, sodium chloride flush, acetaminophen, heparin (porcine), heparin (porcine), glucose, dextrose, glucagon (rDNA), dextrose    REVIEW OF SYSTEMS:     No pruritus, rash, bruising. Cardiac symptoms per HPI. No nausea, vomiting, abdominal pain, GI bleeding, change in bowel habits. No dysuria, urinary frequency, urgency, hematuria, flank pain. Joint pain but no muscle soreness, stiffness, aching. No headache, speech disturbance, lateralizing neurologic deficit. No hemoptysis, epistaxis, easy bruising. No anxiety, depression. No symptoms of hypothyroidism, hyperthyroidism, diabetes, heat or cold intolerance. FAMILY HISTORY: Negative for CAD in first-degree relatives. SOCIAL HISTORY: Negative for alcohol, tobacco, or illicit drug use.       PHYSICAL EXAM:    General Appearance:  awake, alert, oriented, in no acute distress  Neck:  no bruits  Lungs:  Normal expansion. Clear to auscultation. No rales, rhonchi, or wheezing. Heart:  Heart sounds are normal.  Regular rate and rhythm without murmur, gallop or rub. Abdomen:  Soft, non-tender. Extremities: Extremities warm to touch, pink, with no edema. Neuro/musculosketal:  Unremarkable. LABS:    No results for input(s): NA, CREATININE in the last 72 hours. Invalid input(s):  K,  BUN    Recent Labs     01/19/21  0600 01/20/21  0522   HGB 12.1* 11.9*       No results for input(s): INR in the last 72 hours. IMPRESSION:    #1.  Recent non-STEMI with surgical disease-CABG planned for this Friday per CT surgery. Continue aspirin and stop heparin infusion when okay with surgical team.    #2. Hypertension-blood pressure controlled. No new antihypertensives added. #3. Hyperlipidemia-statin. #4.  Diabetes-per hospitalist.    #5.  Continue to follow.

## 2021-01-20 NOTE — PROGRESS NOTES
Hospitalist Progress Note      PCP: WILVRE Coburn NP    Date of Admission: 1/15/2021    Chief Complaint: chest pain    Hospital Course:   68 y.o. male with PMH of cancer, COPD, DM, blood clots, HLD and Covid who presented to [facility] with chest pain. He states he had Covid back in November and during that time was believed to have had a non-STEMI. He presented to the hospital today for an elective cardiac cath. 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 CTS has been consulted. He will started on statin and BB. The patient states he has intermittent chest pain radiating to his back, states it is 6/10 and dull. He does not smoke but uses smokeless tobacco  1/16: seen by CTS, patient is refusing surgery at this time-- encouraged to speak with family. BP improved. BGL stable. The patient is considering the surgery, but would like to have CTS speak with daughter tomorrow morning on phone when they round. He did voice some of his concerns with me about the surgery. He doesn't like staying in hospitals, he's concerned about how long he will be inpatient, he's concerned he won't be able to go home and take care of himself, he's concerned about pain from surgery. I have attempted to call his daughter several times without any answer on her phone. 1/17: seen by cardiology and CTS, patient now considering CABG. preop workup continues. The patient voiced his biggest concern is post-op pain. 1/18: labs stable. Vitals stable. Patient is agreeable to proceed with CABG--possibly Wed or Fri (1/20 or 1/22)  1/19: BGL remains elevated on high dose ISS, will add lantus 10 units at night while inpatient. A1c 6.6 on 1/16. Continues on heparin gtt. 1/20: vitals stable. BGL stable. Continues on heparin gtt. Plan for CABG 1/22. PFTs completed, found to have severely restricted lung physiology with noted moderate-to-severe loss in gas transfer.     Subjective:    Patient lying in bed speaking with resident with pulmonology. The patient has no complaints at the present time. States he has not moved his bowels yet. Medications:  Reviewed    Infusion Medications    heparin (PORCINE) Infusion 11.186 Units/kg/hr (01/20/21 0653)    dextrose       Scheduled Medications    insulin glargine  10 Units Subcutaneous Nightly    isosorbide mononitrate  60 mg Oral Daily    insulin lispro  0-18 Units Subcutaneous TID WC    insulin lispro  0-9 Units Subcutaneous Nightly    amLODIPine  10 mg Oral Daily    docusate sodium  100 mg Oral Nightly    gabapentin  300 mg Oral BID    metoprolol succinate  50 mg Oral BID    sodium chloride flush  10 mL Intravenous 2 times per day    aspirin  81 mg Oral Daily    rosuvastatin  20 mg Oral Nightly    melatonin  10 mg Oral Nightly     PRN Meds: perflutren lipid microspheres, sodium chloride flush, acetaminophen, heparin (porcine), heparin (porcine), glucose, dextrose, glucagon (rDNA), dextrose      Intake/Output Summary (Last 24 hours) at 1/20/2021 0850  Last data filed at 1/20/2021 0653  Gross per 24 hour   Intake 1078.8 ml   Output --   Net 1078.8 ml       Exam:    /60   Pulse 84   Temp 98.1 °F (36.7 °C) (Oral)   Resp 16   Ht 5' 7\" (1.702 m)   Wt 195 lb (88.5 kg)   SpO2 95%   BMI 30.54 kg/m²     General appearance: No apparent distress, appears stated age and cooperative. HEENT: Pupils equal, round, and reactive to light. Conjunctivae/corneas clear. Neck: Supple, with full range of motion. No jugular venous distention. Trachea midline. Respiratory:  Normal respiratory effort. Clear to auscultation, bilaterally without Rales/Wheezes/Rhonchi. Cardiovascular: Regular rate and rhythm with normal S1/S2 without murmurs, rubs or gallops. Abdomen: Soft, non-tender, non-distended with normal bowel sounds. Musculoskeletal: No clubbing, cyanosis or edema bilaterally. Full range of motion without deformity.   Skin: Skin color, texture, turgor normal.  No rashes or lesions. Neurologic:  Neurovascularly intact without any focal sensory/motor deficits. Psychiatric: Alert and oriented, thought content appropriate, normal insight  Capillary Refill: Brisk,< 3 seconds   Peripheral Pulses: +2 palpable, equal bilaterally       Labs:   Recent Labs     01/19/21  0600 01/20/21  0522   WBC 6.2 7.0   HGB 12.1* 11.9*   HCT 36.1* 36.1*    194     No results for input(s): NA, K, CL, CO2, BUN, CREATININE, CALCIUM, PHOS in the last 72 hours. Invalid input(s): MAGNES  No results for input(s): AST, ALT, BILIDIR, BILITOT, ALKPHOS in the last 72 hours. No results for input(s): INR in the last 72 hours. No results for input(s): Crystal Hortencia in the last 72 hours. VL NED BILATERAL LIMITED 1-2 LEVELS   Final Result      US DUP LOWER EXTREMITY MAPPING BILAT VENOUS   Final Result   Bilateral venous mapping as described. No greater   saphenous vein venous thrombosis visualized. US CAROTID ARTERY BILATERAL   Final Result   Atherosclerotic disease. No hemodynamically significant stenosis is   identified   Estimated stenosis by NASCET criteria in the proximal right carotid   artery is between 0% and 49%. Estimated stenosis by NASCET criteria in the proximal left carotid   artery is between 0% and 49%. CT CHEST WO CONTRAST   Final Result   1. Loculated right pleural effusion with adjacent subsegmental atelectasis. 2.  No evidence of pneumonia. 3.  Atherosclerotic calcifications associated with the coronary arteries.           Assessment/Plan:    Active Hospital Problems    Diagnosis Date Noted    [facility]chronic obstructive [facility] disease) ([facility] [J44.9] 01/16/2021    HTN (hypertension) [I10] 01/16/2021    Type 2 diabetes mellitus ([facility] [E11.9] 01/16/2021    HLD (hyperlipidemia) [E78.5] 01/16/2021    CAD (coronary artery disease) [I25.10] 01/16/2021    Stable angina pectoris ([facility] [I20.8] 01/15/2021     Plan  Recommendations per CTS  Recommendations per

## 2021-01-20 NOTE — PROGRESS NOTES
CC: MVCAD    Brief HPI:  Patient seen with Dr. Torsten Arzate. Awake, alert. States it feels like he caught a cold; coughing.        Past Medical History:   Diagnosis Date    Arthritis     CAD (coronary artery disease) 1/16/2021    Cancer ([facility]     COPD (chronic obstructive pulmonary disease) ([facility]     Diabetes mellitus ([facility]     HTN (hypertension) 1/16/2021    Hx of blood clots     Hyperlipidemia      Past Surgical History:   Procedure Laterality Date    BACK SURGERY  1995    CHOLECYSTECTOMY  2003    SKIN CANCER EXCISION       Social History     Socioeconomic History    Marital status: Unknown     Spouse name: Not on file    Number of children: Not on file    Years of education: Not on file    Highest education level: Not on file   Occupational History    Not on file   Social Needs    Financial resource strain: Not on file    Food insecurity     Worry: Not on file     Inability: Not on file    Transportation needs     Medical: Not on file     Non-medical: Not on file   Tobacco Use    Smoking status: Former Smoker    Smokeless tobacco: Never Used   Substance and Sexual Activity    Alcohol use: Not on file    Drug use: Not on file    Sexual activity: Not on file   Lifestyle    Physical activity     Days per week: Not on file     Minutes per session: Not on file    Stress: Not on file   Relationships    Social connections     Talks on phone: Not on file     Gets together: Not on file     Attends Church service: Not on file     Active member of club or organization: Not on file     Attends meetings of clubs or organizations: Not on file     Relationship status: Not on file    Intimate partner violence     Fear of current or ex partner: Not on file     Emotionally abused: Not on file     Physically abused: Not on file     Forced sexual activity: Not on file   Other Topics Concern    Not on file   Social History Narrative    Not on file     Family History   Problem Relation Age of Onset    Diabetes Mother        Vitals:    01/19/21 0800 01/19/21 1700 01/19/21 1930 01/19/21 2320   BP: 134/63 115/65 121/64 131/60   Pulse: 76 92 88 84   Resp: 18 18 16 16   Temp: 97.4 °F (36.3 °C) 97.8 °F (36.6 °C) 97.8 °F (36.6 °C) 98.1 °F (36.7 °C)   TempSrc: Temporal Temporal Oral Oral   SpO2: 93% 94%  95%   Weight:       Height:               Intake/Output Summary (Last 24 hours) at 1/20/2021 1000  Last data filed at 1/20/2021 3980  Gross per 24 hour   Intake 1078.8 ml   Output --   Net 1078.8 ml         Recent Labs     01/19/21  0600 01/20/21  0522   WBC 6.2 7.0   HGB 12.1* 11.9*   HCT 36.1* 36.1*    194      No results for input(s): BUN, CREATININE in the last 72 hours. ROS:   Negative for CP, palpitations, SOB at rest, dizziness/lightheadedness, fever, chills, body aches, headache, sinus congestion      Physical Exam   Constitutional: Oriented to person, place, and time. Appears well-developed. No distress. Cardiovascular: Normal rate, regular rhythm and normal heart sounds. Pulmonary/Chest: Effort normal. No respiratory distress. ; cough  Abdominal: Soft. Bowel sounds are normal.   Musculoskeletal: Normal range of motion. Neurological: alert and oriented to person, place, and time. Skin: Skin is warm and dry. Psychiatric: normal mood and affect. A/P:  1) MVCAD  - Patient now agreeable to surgery  - There is some confusion as to whether patient received/took plavix prior to heart cath (not documented)- Teg and Platelet mapping yesterday  - Pre-op studies completed   - Patient did have covid 11/20- since recovered, CT scan does show small right pleural effusion, PFTs completed and poor - pulmonary consulted for recs prior to surgery  - Plan is OR on Friday  - All R/B/I discused        Note: 25 minutes was spent providing face-to-face patient care, including:  and coordinating care, reviewing the chart, labs, and diagnostics, as well as medical decision making.  Greater than 50%

## 2021-01-21 ENCOUNTER — ANESTHESIA EVENT (OUTPATIENT)
Dept: OPERATING ROOM | Age: 81
DRG: 233 | End: 2021-01-21
Payer: MEDICARE

## 2021-01-21 LAB
ABO/RH: NORMAL
ALBUMIN SERPL-MCNC: 3.5 G/DL (ref 3.5–5.2)
ALP BLD-CCNC: 117 U/L (ref 40–129)
ALT SERPL-CCNC: 31 U/L (ref 0–40)
ANION GAP SERPL CALCULATED.3IONS-SCNC: 13 MMOL/L (ref 7–16)
ANTIBODY SCREEN: NORMAL
APTT: 66 SEC (ref 24.5–35.1)
APTT: 71.3 SEC (ref 24.5–35.1)
AST SERPL-CCNC: 23 U/L (ref 0–39)
BILIRUB SERPL-MCNC: 0.3 MG/DL (ref 0–1.2)
BUN BLDV-MCNC: 20 MG/DL (ref 8–23)
CALCIUM SERPL-MCNC: 8.7 MG/DL (ref 8.6–10.2)
CHLORIDE BLD-SCNC: 98 MMOL/L (ref 98–107)
CO2: 23 MMOL/L (ref 22–29)
CREAT SERPL-MCNC: 1.4 MG/DL (ref 0.7–1.2)
GFR AFRICAN AMERICAN: 59
GFR NON-AFRICAN AMERICAN: 49 ML/MIN/1.73
GLUCOSE BLD-MCNC: 411 MG/DL (ref 74–99)
HCT VFR BLD CALC: 38.2 % (ref 37–54)
HEMOGLOBIN: 12.8 G/DL (ref 12.5–16.5)
INR BLD: 1.1
MCH RBC QN AUTO: 29.2 PG (ref 26–35)
MCHC RBC AUTO-ENTMCNC: 33.5 % (ref 32–34.5)
MCV RBC AUTO: 87 FL (ref 80–99.9)
METER GLUCOSE: 296 MG/DL (ref 74–99)
METER GLUCOSE: 377 MG/DL (ref 74–99)
METER GLUCOSE: 392 MG/DL (ref 74–99)
METER GLUCOSE: 403 MG/DL (ref 74–99)
PDW BLD-RTO: 13.9 FL (ref 11.5–15)
PLATELET # BLD: 184 E9/L (ref 130–450)
PMV BLD AUTO: 9.2 FL (ref 7–12)
POTASSIUM SERPL-SCNC: 4.7 MMOL/L (ref 3.5–5)
PROTHROMBIN TIME: 12.8 SEC (ref 9.3–12.4)
RBC # BLD: 4.39 E12/L (ref 3.8–5.8)
SODIUM BLD-SCNC: 134 MMOL/L (ref 132–146)
TOTAL PROTEIN: 6.7 G/DL (ref 6.4–8.3)
WBC # BLD: 6.1 E9/L (ref 4.5–11.5)

## 2021-01-21 PROCEDURE — 6370000000 HC RX 637 (ALT 250 FOR IP): Performed by: THORACIC SURGERY (CARDIOTHORACIC VASCULAR SURGERY)

## 2021-01-21 PROCEDURE — 6360000002 HC RX W HCPCS: Performed by: INTERNAL MEDICINE

## 2021-01-21 PROCEDURE — 86923 COMPATIBILITY TEST ELECTRIC: CPT

## 2021-01-21 PROCEDURE — 36415 COLL VENOUS BLD VENIPUNCTURE: CPT

## 2021-01-21 PROCEDURE — 85027 COMPLETE CBC AUTOMATED: CPT

## 2021-01-21 PROCEDURE — 6370000000 HC RX 637 (ALT 250 FOR IP): Performed by: INTERNAL MEDICINE

## 2021-01-21 PROCEDURE — 82962 GLUCOSE BLOOD TEST: CPT

## 2021-01-21 PROCEDURE — 6370000000 HC RX 637 (ALT 250 FOR IP): Performed by: FAMILY MEDICINE

## 2021-01-21 PROCEDURE — 99232 SBSQ HOSP IP/OBS MODERATE 35: CPT | Performed by: INTERNAL MEDICINE

## 2021-01-21 PROCEDURE — 6370000000 HC RX 637 (ALT 250 FOR IP): Performed by: CLINICAL NURSE SPECIALIST

## 2021-01-21 PROCEDURE — 86901 BLOOD TYPING SEROLOGIC RH(D): CPT

## 2021-01-21 PROCEDURE — 85610 PROTHROMBIN TIME: CPT

## 2021-01-21 PROCEDURE — 2580000003 HC RX 258: Performed by: THORACIC SURGERY (CARDIOTHORACIC VASCULAR SURGERY)

## 2021-01-21 PROCEDURE — 2580000003 HC RX 258: Performed by: INTERNAL MEDICINE

## 2021-01-21 PROCEDURE — 93005 ELECTROCARDIOGRAM TRACING: CPT | Performed by: INTERNAL MEDICINE

## 2021-01-21 PROCEDURE — 94640 AIRWAY INHALATION TREATMENT: CPT

## 2021-01-21 PROCEDURE — 80053 COMPREHEN METABOLIC PANEL: CPT

## 2021-01-21 PROCEDURE — 85730 THROMBOPLASTIN TIME PARTIAL: CPT

## 2021-01-21 PROCEDURE — 2140000000 HC CCU INTERMEDIATE R&B

## 2021-01-21 PROCEDURE — 86900 BLOOD TYPING SEROLOGIC ABO: CPT

## 2021-01-21 PROCEDURE — 86850 RBC ANTIBODY SCREEN: CPT

## 2021-01-21 RX ORDER — MORPHINE SULFATE 2 MG/ML
2 INJECTION, SOLUTION INTRAMUSCULAR; INTRAVENOUS EVERY 4 HOURS PRN
Status: DISCONTINUED | OUTPATIENT
Start: 2021-01-21 | End: 2021-01-22

## 2021-01-21 RX ORDER — SODIUM CHLORIDE 0.9 % (FLUSH) 0.9 %
10 SYRINGE (ML) INJECTION EVERY 12 HOURS SCHEDULED
Status: DISCONTINUED | OUTPATIENT
Start: 2021-01-21 | End: 2021-01-22

## 2021-01-21 RX ORDER — CALCIUM CARBONATE 200(500)MG
1000 TABLET,CHEWABLE ORAL ONCE
Status: COMPLETED | OUTPATIENT
Start: 2021-01-21 | End: 2021-01-21

## 2021-01-21 RX ORDER — SODIUM CHLORIDE 0.9 % (FLUSH) 0.9 %
10 SYRINGE (ML) INJECTION PRN
Status: DISCONTINUED | OUTPATIENT
Start: 2021-01-21 | End: 2021-01-22

## 2021-01-21 RX ORDER — CHLORHEXIDINE GLUCONATE 4 G/100ML
SOLUTION TOPICAL SEE ADMIN INSTRUCTIONS
Status: DISCONTINUED | OUTPATIENT
Start: 2021-01-21 | End: 2021-01-22 | Stop reason: HOSPADM

## 2021-01-21 RX ORDER — CHLORHEXIDINE GLUCONATE 0.12 MG/ML
15 RINSE ORAL ONCE
Status: COMPLETED | OUTPATIENT
Start: 2021-01-22 | End: 2021-01-22

## 2021-01-21 RX ADMIN — METHYLPREDNISOLONE SODIUM SUCCINATE 40 MG: 40 INJECTION, POWDER, FOR SOLUTION INTRAMUSCULAR; INTRAVENOUS at 22:25

## 2021-01-21 RX ADMIN — INSULIN LISPRO 15 UNITS: 100 INJECTION, SOLUTION INTRAVENOUS; SUBCUTANEOUS at 11:38

## 2021-01-21 RX ADMIN — METOPROLOL SUCCINATE 50 MG: 50 TABLET, EXTENDED RELEASE ORAL at 09:31

## 2021-01-21 RX ADMIN — HEPARIN SODIUM 11.19 UNITS/KG/HR: 10000 INJECTION, SOLUTION INTRAVENOUS at 05:55

## 2021-01-21 RX ADMIN — BETAMETHASONE DIPROPIONATE: 0.5 CREAM TOPICAL at 22:21

## 2021-01-21 RX ADMIN — INSULIN LISPRO 15 UNITS: 100 INJECTION, SOLUTION INTRAVENOUS; SUBCUTANEOUS at 16:29

## 2021-01-21 RX ADMIN — DOCUSATE SODIUM 100 MG: 100 CAPSULE ORAL at 22:19

## 2021-01-21 RX ADMIN — SODIUM CHLORIDE, PRESERVATIVE FREE 10 ML: 5 INJECTION INTRAVENOUS at 22:20

## 2021-01-21 RX ADMIN — METHYLPREDNISOLONE SODIUM SUCCINATE 40 MG: 40 INJECTION, POWDER, FOR SOLUTION INTRAMUSCULAR; INTRAVENOUS at 12:33

## 2021-01-21 RX ADMIN — METOPROLOL SUCCINATE 50 MG: 50 TABLET, EXTENDED RELEASE ORAL at 22:19

## 2021-01-21 RX ADMIN — IPRATROPIUM BROMIDE AND ALBUTEROL SULFATE 1 AMPULE: .5; 3 SOLUTION RESPIRATORY (INHALATION) at 08:11

## 2021-01-21 RX ADMIN — CALCIUM CARBONATE (ANTACID) CHEW TAB 500 MG 1000 MG: 500 CHEW TAB at 22:18

## 2021-01-21 RX ADMIN — ISOSORBIDE MONONITRATE 60 MG: 60 TABLET ORAL at 09:31

## 2021-01-21 RX ADMIN — GABAPENTIN 300 MG: 300 CAPSULE ORAL at 09:31

## 2021-01-21 RX ADMIN — Medication 10 MG: at 22:22

## 2021-01-21 RX ADMIN — GABAPENTIN 300 MG: 300 CAPSULE ORAL at 22:20

## 2021-01-21 RX ADMIN — IPRATROPIUM BROMIDE AND ALBUTEROL SULFATE 1 AMPULE: .5; 3 SOLUTION RESPIRATORY (INHALATION) at 19:46

## 2021-01-21 RX ADMIN — ASPIRIN 81 MG: 81 TABLET, CHEWABLE ORAL at 09:39

## 2021-01-21 RX ADMIN — AMLODIPINE BESYLATE 10 MG: 10 TABLET ORAL at 09:31

## 2021-01-21 RX ADMIN — ARFORMOTEROL TARTRATE 15 MCG: 15 SOLUTION RESPIRATORY (INHALATION) at 08:11

## 2021-01-21 RX ADMIN — SODIUM CHLORIDE, PRESERVATIVE FREE 10 ML: 5 INJECTION INTRAVENOUS at 05:54

## 2021-01-21 RX ADMIN — BUDESONIDE 500 MCG: 0.5 SUSPENSION RESPIRATORY (INHALATION) at 08:11

## 2021-01-21 RX ADMIN — IPRATROPIUM BROMIDE AND ALBUTEROL SULFATE 1 AMPULE: .5; 3 SOLUTION RESPIRATORY (INHALATION) at 12:03

## 2021-01-21 RX ADMIN — METHYLPREDNISOLONE SODIUM SUCCINATE 40 MG: 40 INJECTION, POWDER, FOR SOLUTION INTRAMUSCULAR; INTRAVENOUS at 05:54

## 2021-01-21 RX ADMIN — ARFORMOTEROL TARTRATE 15 MCG: 15 SOLUTION RESPIRATORY (INHALATION) at 19:46

## 2021-01-21 RX ADMIN — MUPIROCIN: 20 OINTMENT TOPICAL at 23:00

## 2021-01-21 RX ADMIN — INSULIN LISPRO 9 UNITS: 100 INJECTION, SOLUTION INTRAVENOUS; SUBCUTANEOUS at 22:28

## 2021-01-21 RX ADMIN — MORPHINE SULFATE 2 MG: 2 INJECTION, SOLUTION INTRAMUSCULAR; INTRAVENOUS at 09:49

## 2021-01-21 RX ADMIN — BUDESONIDE 500 MCG: 0.5 SUSPENSION RESPIRATORY (INHALATION) at 19:47

## 2021-01-21 RX ADMIN — INSULIN GLARGINE 10 UNITS: 100 INJECTION, SOLUTION SUBCUTANEOUS at 22:27

## 2021-01-21 RX ADMIN — Medication 10 ML: at 09:52

## 2021-01-21 RX ADMIN — INSULIN LISPRO 9 UNITS: 100 INJECTION, SOLUTION INTRAVENOUS; SUBCUTANEOUS at 09:35

## 2021-01-21 RX ADMIN — ROSUVASTATIN 20 MG: 20 TABLET, FILM COATED ORAL at 22:19

## 2021-01-21 ASSESSMENT — PAIN SCALES - GENERAL
PAINLEVEL_OUTOF10: 0
PAINLEVEL_OUTOF10: 7
PAINLEVEL_OUTOF10: 0

## 2021-01-21 NOTE — PROGRESS NOTES
DAILY PROGRESS NOTE - [facility]    SUBJECTIVE:    He is being followed for surgical coronary disease. Surgery scheduled for tomorrow 1/22. No chest pain, worsening dyspnea, palpitations, syncope, presyncope. Sinus rhythm on telemetry. Echo showed LVEF 06%, stage I diastolic dysfunction, trace valvular regurgitation. OBJECTIVE:    His vital signs were reviewed today. Vitals:    01/20/21 2215   BP: 137/88   Pulse: 87   Resp: 18   Temp: 97.6 °F (36.4 °C)   SpO2: 95%       Scheduled Meds:   magnesium citrate  296 mL Oral Once    Arformoterol Tartrate  15 mcg Nebulization BID    budesonide  0.5 mg Nebulization BID    ipratropium-albuterol  1 ampule Inhalation Q4H WA    betamethasone dipropionate   Topical BID    methylPREDNISolone  40 mg Intravenous Q8H    insulin glargine  10 Units Subcutaneous Nightly    isosorbide mononitrate  60 mg Oral Daily    insulin lispro  0-18 Units Subcutaneous TID WC    insulin lispro  0-9 Units Subcutaneous Nightly    amLODIPine  10 mg Oral Daily    docusate sodium  100 mg Oral Nightly    gabapentin  300 mg Oral BID    metoprolol succinate  50 mg Oral BID    sodium chloride flush  10 mL Intravenous 2 times per day    aspirin  81 mg Oral Daily    rosuvastatin  20 mg Oral Nightly    melatonin  10 mg Oral Nightly     Continuous Infusions:   heparin (PORCINE) Infusion 11.186 Units/kg/hr (01/21/21 0555)    dextrose       PRN Meds:.magnesium hydroxide, bisacodyl, perflutren lipid microspheres, sodium chloride flush, acetaminophen, heparin (porcine), heparin (porcine), glucose, dextrose, glucagon (rDNA), dextrose    REVIEW OF SYSTEMS:     No pruritus, rash, bruising. Cardiac symptoms per HPI. No nausea, vomiting, abdominal pain, GI bleeding, change in bowel habits. No dysuria, urinary frequency, urgency, hematuria, flank pain. Joint pain but no muscle soreness, stiffness, aching. No headache, speech disturbance, lateralizing neurologic deficit.   No hemoptysis, epistaxis, easy bruising. No anxiety, depression. No symptoms of hypothyroidism, hyperthyroidism, diabetes, heat or cold intolerance. FAMILY HISTORY: Negative for CAD in first-degree relatives. SOCIAL HISTORY: Negative for alcohol, tobacco, or illicit drug use. PHYSICAL EXAM:    General Appearance: alert, oriented, in no acute distress  Neck:  no bruits  Lungs:  Normal expansion. Clear to auscultation. No rales, rhonchi, or wheezing. Heart:  Heart sounds are normal.  Regular rate and rhythm without murmur, gallop or rub. Abdomen:  Soft, non-tender. Extremities: Extremities warm to touch, pink, with no edema. Neuro/musculosketal:  Unremarkable. LABS:    No results for input(s): NA, CREATININE in the last 72 hours. Invalid input(s):  K,  BUN    Recent Labs     01/19/21  0600 01/20/21  0522 01/21/21  0609   HGB 12.1* 11.9* 12.8       No results for input(s): INR in the last 72 hours. IMPRESSION:    #1.  Recent non-STEMI with surgical disease-CABG planned for tomorrow per CT surgery. Continue aspirin and stop heparin infusion when okay with surgical team.    #2. Hypertension-blood pressure controlled. No new antihypertensives added. #3. Hyperlipidemia-statin. #4.  Diabetes-per hospitalist.    #5.  Continue to follow.

## 2021-01-21 NOTE — PROGRESS NOTES
Pt stated to complain of chest pain. Vitals taken and mornign medications given. Dr. Madan Sewell was in room he ordered morphine. Pt had just completed a breathing treatment and the pain was worse with inhalation. Placed on 2 liters oxygen and will get EKG and give morphine.

## 2021-01-21 NOTE — PROGRESS NOTES
Pt re-examined   C/o chest pain earlier this AM  Now resolved   Continue to monitor, plan for surgery tomorrow as scheduled

## 2021-01-21 NOTE — PROGRESS NOTES
Hospitalist Progress Note      PCP: WILVER Guillermo NP    Date of Admission: 1/15/2021    Hospital Course:80 y. o. male with PMH of cancer, COPD, DM, blood clots, HLD with chest pain. The patient had admission in November thousand 20 for COVID-19 and was noted to have a non-STEMI. Ischemic work-up could not be done due to Covid. He was scheduled for elective cardiac cath. He was found to have multivessel disease. CT surgery was consulted. Patient is scheduled for CABG. Subjective:    Currently has substernal chest pain. No shortness of breath. No cough      Medications:  Reviewed    Infusion Medications    heparin (PORCINE) Infusion 11.186 Units/kg/hr (01/21/21 0599)    dextrose       Scheduled Medications    magnesium citrate  296 mL Oral Once    Arformoterol Tartrate  15 mcg Nebulization BID    budesonide  0.5 mg Nebulization BID    ipratropium-albuterol  1 ampule Inhalation Q4H WA    betamethasone dipropionate   Topical BID    methylPREDNISolone  40 mg Intravenous Q8H    insulin glargine  10 Units Subcutaneous Nightly    isosorbide mononitrate  60 mg Oral Daily    insulin lispro  0-18 Units Subcutaneous TID WC    insulin lispro  0-9 Units Subcutaneous Nightly    amLODIPine  10 mg Oral Daily    docusate sodium  100 mg Oral Nightly    gabapentin  300 mg Oral BID    metoprolol succinate  50 mg Oral BID    sodium chloride flush  10 mL Intravenous 2 times per day    aspirin  81 mg Oral Daily    rosuvastatin  20 mg Oral Nightly    melatonin  10 mg Oral Nightly     PRN Meds: magnesium hydroxide, bisacodyl, perflutren lipid microspheres, sodium chloride flush, acetaminophen, heparin (porcine), heparin (porcine), glucose, dextrose, glucagon (rDNA), dextrose      Intake/Output Summary (Last 24 hours) at 1/21/2021 0912  Last data filed at 1/21/2021 0852  Gross per 24 hour   Intake 727 ml   Output 1300 ml   Net -573 ml       Physical Exam Performed:    BP (!) 140/74   Pulse 68   Temp

## 2021-01-21 NOTE — PROGRESS NOTES
chart, labs, and diagnostics, as well as medical decision making. Greater than 50% of this time was spent instructing and counseling the patient face to face regarding findings and recommendations.

## 2021-01-21 NOTE — PROGRESS NOTES
[facility]  Department of Internal Medicine  Division of Pulmonary, Critical Care and Sleep Medicine  Progress Note  Juliana Khan DO, [facility], [facility] NATAN parkinson MD, [facility]    Patient: Maria Isabel Short  MRN: [id number]  : 1940    Encounter Time: 3:54 PM     Date of Admission: 1/15/2021 11:51 AM    Primary Care Physician: Arbie Holstein, APRN - NP    Reason for Consultation: Pre Op assesssment     SUBJECTIVE:    Doing ok  Episode of CP with bronchodilators  For OR in am    OBJECTIVE:     PHYSICAL EXAM:   VITALS:   Vitals:    21 0931 21 1145 21 1146 21 1410   BP: (!) 190/94 107/64 115/65 111/63   Pulse: 104 90 89 93   Resp:       Temp: 97.8 °F (36.6 °C)   97.4 °F (36.3 °C)   TempSrc:    Temporal   SpO2: 94%   92%   Weight:       Height:            Intake/Output Summary (Last 24 hours) at 2021 1554  Last data filed at 2021 1547  Gross per 24 hour   Intake 1267 ml   Output 1050 ml   Net 217 ml        CONSTITUTIONAL:   A&O x 3, NAD  SKIN:     No rash, No suspicious lesions or skin discoloration  HEENT:     EOMI, MMM, No thrush  NECK:    No bruits, No JVP apprechiated  CV:      Sinus,  No murmur, No rubs, No gallops  PULMONARY:   Couse BS,  No Wheezing, No Rales, No Rhonchi      No noted egophony  ABDOMEN:     Soft, non-tender. BS normal. No R/R/G  EXT:    No deformities . No clubbing. Trace lower extremity edema, No venous stasis  PULSE:   Appears equal and palpable.   PSYCHIATRIC:  Seems appropriate, No acute psycosis  MS:    No fractures, No gross weakness  NEUROLOGIC:   Non-focal     DATA: IMAGING & TESTING:     LABORATORY TESTS:    CBC:   Lab Results   Component Value Date    WBC 6.1 2021    RBC 4.39 2021    HGB 12.8 2021    HCT 38.2 2021    MCV 87.0 2021    MCH 29.2 2021    MCHC 33.5 2021    RDW 13.9 2021     2021    MPV 9.2 2021     CMP:    Lab Results Component Value Date     01/21/2021    K 4.7 01/21/2021    K 4.3 01/16/2021    CL 98 01/21/2021    CO2 23 01/21/2021    BUN 20 01/21/2021    CREATININE 1.4 01/21/2021    GFRAA 59 01/21/2021    LABGLOM 49 01/21/2021    GLUCOSE 411 01/21/2021    PROT 6.7 01/21/2021    LABALBU 3.5 01/21/2021    CALCIUM 8.7 01/21/2021    BILITOT 0.3 01/21/2021    ALKPHOS 117 01/21/2021    AST 23 01/21/2021    ALT 31 01/21/2021     Calcium:    Lab Results   Component Value Date    CALCIUM 8.7 01/21/2021        PRO-BNP: No results found for: PROBNP   ABGs:   Lab Results   Component Value Date    PH 7.383 01/20/2021    PO2 70.4 01/20/2021    PCO2 36.4 01/20/2021     Hemoglobin A1C: No components found for: HGBA1C    IMAGING:  Imaging tests were completed and reviewed and discussed radiology and care team involved and reveals     Pulmonary function test: Pulmonary function test revealed forced vital capacity of 1.4 at 6 liters, 41% of predicted with an FEV1 of 1.5 at 7 liters, 44% ofpredicted with an FEV1/FVC ratio of 80%. Midflow rates are 60% of predicted with maximum voluntary ventilation at 20 liters per minute, 88% of predicted.     Static lung volumes with total vital capacity of 1.48 liters, 41% of predicted. Inspiratory capacity is 1.36 liters, 45% of predicted. Expiratory reserve volume 0.13 liters, 73% of predicted. The diffusion capacity is 17.17 mL/min per mmHg, which is 25% of predicted. Severely restricted lung physiology with noted moderate-to-severe loss in gas transfer. Echo Complete  TTE procedure:Echo Complete W/Doppler & Color Flow. Procedure Date Date: 01/16/2021 Start: 09:28 AM Study Location: Portable Technical Quality: Adequate visualization Indications:LV function. Patient Status: Routine Height: 67 inches Weight: 195 pounds BSA: 2 m^2 BMI: 30.54 kg/m^2  Findings   Left Ventricle  Left ventricle is normal in size. Normal left ventricular wall thickness. Low normal ejection fraction.   Ejection fraction is visually estimated at 55%. There is doppler evidence of stage I diastolic dysfunction. Right Ventricle  Mildly dilated right ventricle. Right ventricle global systolic function is normal.   Left Atrium  Normal sized left atrium. Interatrial septum appears intact. Right Atrium  Normal right atrium size. Mitral Valve  Structurally normal mitral valve. No evidence of mitral valve stenosis. Mild mitral regurgitation is present. Tricuspid Valve  The tricuspid valve appears structurally normal.  Physiologic and/or trace tricuspid regurgitation. Pulmonary hypertension is not present. Aortic Valve  Individual aortic valve leaflets are not clearly visualized. No hemodynamically significant aortic stenosis is present. Mild aortic regurgitation is noted. Pulmonic Valve  The pulmonic valve was not well visualized. No evidence of pulmonic valve stenosis. No evidence of any pulmonic regurgitation. Pericardial Effusion  No evidence of pericardial effusion. Pleural Effusion  No evidence of pleural effusion. Aorta  Aortic root dimension within normal limits. Ct Chest Wo Contrast    Result Date: 1/16/2021  FINDINGS: The heart is normal in size. Atherosclerotic calcifications are associated with the coronary arteries. No pericardial effusion. Ascending thoracic aorta measures 3.2 cm in diameter. Descending thoracic aorta measures 2.4 cm in diameter. There is a combined origin of brachiocephalic trunk and left common carotid artery at thoracic aortic arch. No mediastinal fluid collections. No mediastinal or hilar lymphadenopathy. There are no airspace opacities. There is a loculated right pleural effusion with adjacent subsegmental atelectasis. No pneumothorax. View of the upper abdomen shows normal bilateral adrenal glands. 1.  Loculated right pleural effusion with adjacent subsegmental atelectasis. 2.  No evidence of pneumonia.  3.  Atherosclerotic calcifications associated with the coronary arteries. Us Carotid Artery Bilateral  Result Date: 1/17/2021  Atherosclerotic disease. No hemodynamically significant stenosis is identified Estimated stenosis by NASCET criteria in the proximal right carotid artery is between 0% and 49%. Estimated stenosis by NASCET criteria in the proximal left carotid artery is between 0% and 49%. 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 The undergo bypass surgery in the next few days and we were asked to comment on preoperative risk. 1. Restrictive physiology  2. MV CAD  3. HLD  4. HTN  5. Loculated pleural effusion    Plan:   1. COntinue with bronchodilators  2. Try to obtain the sputum samples  3. Weaned off steroids  4. Oxygen support  5. CPAP at night - non compliant  6.  ICS @ 2 hours to prevent atelectasis      Patrick Lazcano DO, MPH, FCCP, [facility] [email or web address], FACP

## 2021-01-21 NOTE — CONSULTS
Taking? Authorizing Provider   omeprazole (PRILOSEC) 20 MG delayed release capsule Take 20 mg by mouth daily   Yes Historical Provider, MD   MELATONIN PO Take by mouth   Yes Historical Provider, MD   celecoxib (CELEBREX) 200 MG capsule Take 1 capsule by mouth daily 9/9/20  Yes WILVER Elliott NP   glipiZIDE (GLUCOTROL) 5 MG tablet Take 1 tablet by mouth 2 times daily (before meals) 9/9/20  Yes WILVER Elliott NP   metoprolol succinate (TOPROL XL) 25 MG extended release tablet Take 1 tablet by mouth 2 times daily 9/9/20  Yes WILVER Elliott NP   gabapentin (NEURONTIN) 300 MG capsule Take 1 capsule by mouth 2 times daily for 30 days. 9/9/20 10/9/20  WILVER Elliott NP       ALLERGIES: Shrimp (diagnostic) and Valium [diazepam]       REVIEW OF SYSTEMS:  Otherwise negative if not reported or listed below  Constitutional:  No unanticipated weight loss. No change in sleep pattern or activity. No fevers, chills or rigors. Eyes:    No visual changes or diplopia. No scleral icterus. ENT:    No Headaches, hearing loss or vertigo. No mouth sores or sore throat. Cardiovascular:  + chest discomfort, palpitations. Respiratory:   + cough, No wheezing      No sputum production. No hemoptysis, pleuritic pain. Gastrointestinal:  No abdominal pain, appetite loss, blood in stools. No hematemesis  Genitourinary:  No dysuria, trouble voiding or hematuria.      + nocturia. Musculoskeletal:   No weakness or joint complaints. Integumentary: No rashes or pruritis. Neurological:  No headache, numbness or tingling. Psychiatric:   No effect on mood, memory, mentation, or behavior. No anxiety or depression. Endocrine:   No excessive thirst, fluid intake, or urination. No tremor. Hematologic:  No abnormal bruising or bleeding. Lymphatic:  No swollen lymph nodes. Immunologic:  No hives or hx of anaphaxsis.       OBJECTIVE:     PHYSICAL EXAM:   VITALS:   Vitals:    01/19/21 1930 01/19/21 2320 01/20/21 0800 01/20/21 1541   BP: 121/64 131/60 (!) 147/72    Pulse: 88 84 75    Resp: 16 16 18    Temp: 97.8 °F (36.6 °C) 98.1 °F (36.7 °C) 97.6 °F (36.4 °C)    TempSrc: Oral Oral Temporal    SpO2:  95% 96% 98%   Weight:       Height:            Intake/Output Summary (Last 24 hours) at 1/20/2021 1931  Last data filed at 1/20/2021 1541  Gross per 24 hour   Intake 838.8 ml   Output 800 ml   Net 38.8 ml        CONSTITUTIONAL:    A&O x 3, NAD  SKIN:     No rash, No suspicious lesions or skin discoloration  HEENT:     EOMI, MMM, No thrush  NECK:    No bruits, No JVP apprechiated  CV:      Sinus,  No murmur, No rubs, No gallops  PULMONARY:   Couse BS,  No Wheezing, No Rales, No Rhonchi      No noted egophony  ABDOMEN:     Soft, non-tender. BS normal. No R/R/G  EXT:    No deformities . No clubbing. Trace lower extremity edema, No venous stasis  PULSE:   Appears equal and palpable.   PSYCHIATRIC:  Seems appropriate, No acute psycosis  MS:    No fractures, No gross weakness  NEUROLOGIC:   Non-focal     DATA: IMAGING & TESTING:     LABORATORY TESTS:    CBC:   Lab Results   Component Value Date    WBC 7.0 01/20/2021    RBC 4.13 01/20/2021    HGB 11.9 01/20/2021    HCT 36.1 01/20/2021    MCV 87.4 01/20/2021    MCH 28.8 01/20/2021    MCHC 33.0 01/20/2021    RDW 14.1 01/20/2021     01/20/2021    MPV 9.0 01/20/2021     CMP:    Lab Results   Component Value Date     01/16/2021    K 4.3 01/16/2021     01/16/2021    CO2 21 01/16/2021    BUN 12 01/16/2021    CREATININE 0.7 01/16/2021    GFRAA >60 01/16/2021    LABGLOM >60 01/16/2021    GLUCOSE 163 01/18/2021    CALCIUM 8.8 01/16/2021     Calcium:    Lab Results   Component Value Date    CALCIUM 8.8 01/16/2021        PRO-BNP: No results found for: PROBNP   ABGs:   Lab Results   Component Value Date    PH 7.383 01/20/2021    PO2 70.4 01/20/2021    PCO2 36.4 01/20/2021     Hemoglobin A1C: No components found for: HGBA1C    IMAGING:  Imaging tests were completed and reviewed and discussed radiology and care team involved and reveals     Pulmonary function test: Pulmonary function test revealed forced vital capacity of 1.4 at 6 liters, 41% of predicted with an FEV1 of 1.5 at 7 liters, 44% ofpredicted with an FEV1/FVC ratio of 80%. Midflow rates are 60% of predicted with maximum voluntary ventilation at 20 liters per minute, 88% of predicted.     Static lung volumes with total vital capacity of 1.48 liters, 41% of predicted. Inspiratory capacity is 1.36 liters, 45% of predicted. Expiratory reserve volume 0.13 liters, 73% of predicted. The diffusion capacity is 17.17 mL/min per mmHg, which is 25% of predicted. Severely restricted lung physiology with noted moderate-to-severe loss in gas transfer. Echo Complete  TTE procedure:Echo Complete W/Doppler & Color Flow. Procedure Date Date: 01/16/2021 Start: 09:28 AM Study Location: Portable Technical Quality: Adequate visualization Indications:LV function. Patient Status: Routine Height: 67 inches Weight: 195 pounds BSA: 2 m^2 BMI: 30.54 kg/m^2  Findings   Left Ventricle  Left ventricle is normal in size. Normal left ventricular wall thickness. Low normal ejection fraction. Ejection fraction is visually estimated at 55%. There is doppler evidence of stage I diastolic dysfunction. Right Ventricle  Mildly dilated right ventricle. Right ventricle global systolic function is normal.   Left Atrium  Normal sized left atrium. Interatrial septum appears intact. Right Atrium  Normal right atrium size. Mitral Valve  Structurally normal mitral valve. No evidence of mitral valve stenosis. Mild mitral regurgitation is present. Tricuspid Valve  The tricuspid valve appears structurally normal.  Physiologic and/or trace tricuspid regurgitation. Pulmonary hypertension is not present. Aortic Valve  Individual aortic valve leaflets are not clearly visualized.   No physiology  2. MV CAD  3. HLD  4. HTN  5. Loculated pleural effusion    Plan:   Operative clearance noted with pulmonary risk out lined. Due to the severity of his restrictive disease with a component of mild obstructive airflow disease he could develop with postoperative respiratory insufficiency requiring BiPAP or CPAP. He does have history of sleep apnea and the stop bang questionnaire is high which means airway management be key. We started aggressive bronchodilators on him and short course of steroids to help improve lung function. Sputum assessment was requested. We started on incentive spirometer as it been well shown in the literature to decrease postoperative complications and bypass surgery patients preoperatively. As for the effusion it small and I am not sure its as loculated as they make it seem on the CT report blood gas does not show any hypercapnia so from that standpoint we should be able to ventilate him and oxygen without difficulty. Appreciate the opportunity see him help I outlined concerns we will continue to follow.        Kevin Snell, MPH, [facility]  Professor of Internal Medicine  Pulmonary, Critical Care and Sleep Medicine

## 2021-01-21 NOTE — CARE COORDINATION
1/21/2021 - Cardiology and Cardiothoracic Surgery following. For CABG in am. Spoke with pt and explained he will need to chose a C to follow him after he goes home. He had no preference - called referral to [facility] and spoke with Aurelia. They can accept and will follow. Plan is to return home when medically stable. SW/CM will follow.

## 2021-01-21 NOTE — PROGRESS NOTES
notified Dr. Juan M Núñez that pt had chest pain this morning before his medications and after a breathing treatment. Pt placed on oxygen, EKG was completed, and morphine was given. Pt was also given his scheduled medications. This pain was worsened with inhalation and lasted approximately 45 mins but has since resolved. Cardiothoracic was on the floor and notified aware. Pt is scheduled for CABG at 0700 tomorrow. Will continue to monitor.

## 2021-01-21 NOTE — ANESTHESIA PRE PROCEDURE
Department of Anesthesiology  Preprocedure Note       Name:  Nino Clark   Age:  68 y.o.  :  1940                                          MRN:  [id number]         Date:  2021      Surgeon: Ulices Marquez):  Ary Porter DO    Procedure: Procedure(s):  CORONARY ARTERY BYPASS, ELISEO -- WANTS 0700    Medications prior to admission:   Prior to Admission medications    Medication Sig Start Date End Date Taking? Authorizing Provider   omeprazole (PRILOSEC) 20 MG delayed release capsule Take 20 mg by mouth daily   Yes Historical Provider, MD   MELATONIN PO Take by mouth   Yes Historical Provider, MD   celecoxib (CELEBREX) 200 MG capsule Take 1 capsule by mouth daily 20  Yes WILVER Elliott NP   glipiZIDE (GLUCOTROL) 5 MG tablet Take 1 tablet by mouth 2 times daily (before meals) 20  Yes WILVER Elliott NP   metoprolol succinate (TOPROL XL) 25 MG extended release tablet Take 1 tablet by mouth 2 times daily 20  Yes WILVER Elliott NP   gabapentin (NEURONTIN) 300 MG capsule Take 1 capsule by mouth 2 times daily for 30 days.  9/9/20 10/9/20  WILVER Elliott NP       Current medications:    Current Facility-Administered Medications   Medication Dose Route Frequency Provider Last Rate Last Admin    morphine (PF) injection 2 mg  2 mg Intravenous Q4H PRN Johanne Mustafa MD   2 mg at 21 0949    sodium chloride flush 0.9 % injection 10 mL  10 mL Intravenous 2 times per day Ary Porter DO        sodium chloride flush 0.9 % injection 10 mL  10 mL Intravenous PRN Ary Porter DO        magnesium hydroxide (MILK OF MAGNESIA) 400 MG/5ML suspension 30 mL  30 mL Oral Daily PRN Kenney Gain, APRN - CNS        magnesium citrate solution 296 mL  296 mL Oral Once Kenney Gain, APRN - CNS        Arformoterol Tartrate (BROVANA) nebulizer solution 15 mcg  15 mcg Nebulization BID Melvina Deng, DO   15 mcg at 21 1271  budesonide (PULMICORT) nebulizer suspension 500 mcg  0.5 mg Nebulization BID [place] Presume Solomon Sears., DO   500 mcg at 01/21/21 7243    ipratropium-albuterol (DUONEB) nebulizer solution 1 ampule  1 ampule Inhalation Q4H WA [place] Presume Solomon Sears., DO   1 ampule at 01/21/21 1203    bisacodyl (DULCOLAX) suppository 10 mg  10 mg Rectal Daily PRN WILVER Hoover   10 mg at 01/20/21 2242    betamethasone dipropionate (DIPROLENE) 0.05 % cream   Topical BID WILVER Hoover   Given at 01/20/21 2221    methylPREDNISolone sodium (SOLU-MEDROL) injection 40 mg  40 mg Intravenous Q8H [place] Presumabelino Carbajal Sears., DO   40 mg at 01/21/21 1233    insulin glargine (LANTUS) injection vial 10 Units  10 Units Subcutaneous Nightly WILVER Hoover   10 Units at 01/20/21 2225    isosorbide mononitrate (IMDUR) extended release tablet 60 mg  60 mg Oral Daily Arpita Cannon MD   60 mg at 01/21/21 0931    insulin lispro (HUMALOG) injection vial 0-18 Units  0-18 Units Subcutaneous TID WC WILVER Hoover   15 Units at 01/21/21 1138    insulin lispro (HUMALOG) injection vial 0-9 Units  0-9 Units Subcutaneous Nightly WILVER Hoover   2 Units at 01/20/21 2225    perflutren lipid microspheres (DEFINITY) injection 1.65 mg  1.5 mL Intravenous ONCE PRN Arpita Cannon MD        amLODIPine (NORVASC) tablet 10 mg  10 mg Oral Daily Arpita Cannon MD   10 mg at 01/21/21 0931    docusate sodium (COLACE) capsule 100 mg  100 mg Oral Nightly Clay Almonte, DO   100 mg at 01/20/21 2219    gabapentin (NEURONTIN) capsule 300 mg  300 mg Oral BID Jethro Lloyd MD   300 mg at 01/21/21 0931    metoprolol succinate (TOPROL XL) extended release tablet 50 mg  50 mg Oral BID Jethro Lloyd MD   50 mg at 01/21/21 0931    sodium chloride flush 0.9 % injection 10 mL  10 mL Intravenous 2 times per day Jethro Lloyd MD   10 mL at 01/21/21 1249 [address] [facility]    CHOLECYSTECTOMY  2003    SKIN CANCER EXCISION         Social History:    Social History     Tobacco Use    Smoking status: Former Smoker    Smokeless tobacco: Never Used   Substance Use Topics    Alcohol use: Not on file                                Counseling given: Not Answered      Vital Signs (Current):   Vitals:    01/21/21 0802 01/21/21 0931 01/21/21 1145 01/21/21 1146   BP: (!) 140/74 (!) 190/94 107/64 115/65   Pulse: 68 104 90 89   Resp: 16      Temp: 36.4 °C (97.6 °F) 36.6 °C (97.8 °F)     TempSrc: Temporal      SpO2: 95% 94%     Weight:       Height:                                                  BP Readings from Last 3 Encounters:   01/21/21 115/65       NPO Status:      Advised to be NPO after midnight. BMI:   Wt Readings from Last 3 Encounters:   01/15/21 195 lb (88.5 kg)     Body mass index is 30.54 kg/m². CBC:   Lab Results   Component Value Date    WBC 6.1 01/21/2021    RBC 4.39 01/21/2021    HGB 12.8 01/21/2021    HCT 38.2 01/21/2021    MCV 87.0 01/21/2021    RDW 13.9 01/21/2021     01/21/2021       CMP:   Lab Results   Component Value Date     01/21/2021    K 4.7 01/21/2021    K 4.3 01/16/2021    CL 98 01/21/2021    CO2 23 01/21/2021    BUN 20 01/21/2021    CREATININE 1.4 01/21/2021    GFRAA 59 01/21/2021    LABGLOM 49 01/21/2021    GLUCOSE 411 01/21/2021    PROT 6.7 01/21/2021    CALCIUM 8.7 01/21/2021    BILITOT 0.3 01/21/2021    ALKPHOS 117 01/21/2021    AST 23 01/21/2021    ALT 31 01/21/2021       POC Tests: No results for input(s): POCGLU, POCNA, POCK, POCCL, POCBUN, POCHEMO, POCHCT in the last 72 hours.     Coags:   Lab Results   Component Value Date    PROTIME 12.8 01/21/2021    INR 1.1 01/21/2021    APTT 71.3 01/21/2021       HCG (If Applicable): No results found for: PREGTESTUR, PREGSERUM, HCG, HCGQUANT ABGs: No results found for: PHART, PO2ART, HRM2PRW, QYG5GHM, BEART, B4BTXMWY     Type & Screen (If Applicable):  No results found for: LABABO, LABRH    Drug/Infectious Status (If Applicable):  No results found for: HIV, HEPCAB    COVID-19 Screening (If Applicable):   Lab Results   Component Value Date    COVID19 Not Detected 01/11/2021     EKG 1/16/2021    Normal sinus rhythm  Incomplete right bundle branch block  Abnormal ECG  No previous ECGs available  Confirmed by Yaron Yap ([id number]) on 1/18/2021 7:27:08 AM    ECHO 1/16/2021    Findings      Left Ventricle   Left ventricle is normal in size. Normal left ventricular wall thickness. Low normal ejection fraction. Ejection fraction is visually estimated at 55%. There is doppler evidence of stage I diastolic dysfunction. Right Ventricle   Mildly dilated right ventricle. Right ventricle global systolic function is normal.      Left Atrium   Normal sized left atrium. Interatrial septum appears intact. Right Atrium   Normal right atrium size. Mitral Valve   Structurally normal mitral valve. No evidence of mitral valve stenosis. Mild mitral regurgitation is present. Tricuspid Valve   The tricuspid valve appears structurally normal.   Physiologic and/or trace tricuspid regurgitation. Pulmonary hypertension is not present. Aortic Valve   Individual aortic valve leaflets are not clearly visualized. No hemodynamically significant aortic stenosis is present. Mild aortic regurgitation is noted. Pulmonic Valve   The pulmonic valve was not well visualized. No evidence of pulmonic valve stenosis. No evidence of any pulmonic regurgitation. Pericardial Effusion   No evidence of pericardial effusion. Pleural Effusion   No evidence of pleural effusion. Aorta   Aortic root dimension within normal limits.       Conclusions      Signature      ---------------------------------------------------------------- Electronically signed by Chelsie Dumas MD(Interpreting   physician) on 01/17/2021 06:02 AM   ----------------------------------------------------------------        Anesthesia Evaluation  Patient summary reviewed and Nursing notes reviewed no history of anesthetic complications:   Airway: Mallampati: III  TM distance: >3 FB   Neck ROM: limited  Mouth opening: < 3 FB Dental:    (+) partials  Comment: Patient denies any loose or chipped teeth. Pulmonary: breath sounds clear to auscultation  (+) COPD:                             Cardiovascular:  Exercise tolerance: poor (<4 METS),   (+) hypertension:, angina:, CAD:, hyperlipidemia      ECG reviewed  Rhythm: regular  Rate: normal  Echocardiogram reviewed         Beta Blocker:  Dose within 24 Hrs         Neuro/Psych:   Negative Neuro/Psych ROS              GI/Hepatic/Renal:   (+) GERD: well controlled,           Endo/Other:    (+) DiabetesType II DM, well controlled, , malignancy/cancer (Skin CA -2020). Abdominal:   (+) obese,     Abdomen: soft. Vascular:   + PE.        ROS comment: PE over 10-15 years ago . Anesthesia Plan      general     ASA 4     (R FA #22 PIV )  Induction: intravenous. MIPS: Postoperative opioids intended and Prophylactic antiemetics administered. Anesthetic plan and risks discussed with patient. Plan discussed with CRNA and attending.                 Tian Barrow RN   1/21/2021

## 2021-01-22 ENCOUNTER — APPOINTMENT (OUTPATIENT)
Dept: GENERAL RADIOLOGY | Age: 81
DRG: 233 | End: 2021-01-22
Attending: INTERNAL MEDICINE
Payer: MEDICARE

## 2021-01-22 ENCOUNTER — ANESTHESIA (OUTPATIENT)
Dept: OPERATING ROOM | Age: 81
DRG: 233 | End: 2021-01-22
Payer: MEDICARE

## 2021-01-22 VITALS
TEMPERATURE: 97.3 F | RESPIRATION RATE: 10 BRPM | DIASTOLIC BLOOD PRESSURE: 76 MMHG | SYSTOLIC BLOOD PRESSURE: 119 MMHG | OXYGEN SATURATION: 100 %

## 2021-01-22 LAB
AADO2: 147.6 MMHG
AADO2: 222.1 MMHG
AADO2: 94.6 MMHG
ACTIVATED CLOTTING TIME: 106 SECONDS (ref 99–130)
ACTIVATED CLOTTING TIME: 116 SECONDS (ref 99–130)
ACTIVATED CLOTTING TIME: 431 SECONDS (ref 99–130)
ACTIVATED CLOTTING TIME: 441 SECONDS (ref 99–130)
ACTIVATED CLOTTING TIME: 444 SECONDS (ref 99–130)
ACTIVATED CLOTTING TIME: 445 SECONDS (ref 99–130)
ACTIVATED CLOTTING TIME: 497 SECONDS (ref 99–130)
ACTIVATED CLOTTING TIME: 505 SECONDS (ref 99–130)
ACTIVATED CLOTTING TIME: 581 SECONDS (ref 99–130)
ANION GAP SERPL CALCULATED.3IONS-SCNC: 11 MMOL/L (ref 7–16)
APTT: 23.1 SEC (ref 24.5–35.1)
APTT: 56.2 SEC (ref 24.5–35.1)
B.E.: -2.4 MMOL/L (ref -3–0)
B.E.: -2.8 MMOL/L (ref -3–0)
B.E.: -4.1 MMOL/L (ref -3–0)
B.E.: -4.3 MMOL/L (ref -3–0)
B.E.: -4.4 MMOL/L (ref -3–3)
B.E.: -5.8 MMOL/L (ref -3–3)
B.E.: -6.5 MMOL/L (ref -3–3)
B.E.: -6.5 MMOL/L (ref -3–3)
BUN BLDV-MCNC: 23 MG/DL (ref 8–23)
CALCIUM SERPL-MCNC: 8.4 MG/DL (ref 8.6–10.2)
CARDIOPULMONARY BYPASS: NO
CARDIOPULMONARY BYPASS: YES
CHLORIDE BLD-SCNC: 107 MMOL/L (ref 98–107)
CO2: 22 MMOL/L (ref 22–29)
COHB: 0.2 % (ref 0–1.5)
COHB: 0.2 % (ref 0–1.5)
COHB: 0.3 % (ref 0–1.5)
COHB: 0.3 % (ref 0–1.5)
COMMENT: ABNORMAL
CREAT SERPL-MCNC: 1 MG/DL (ref 0.7–1.2)
CRITICAL: ABNORMAL
DATE ANALYZED: ABNORMAL
DATE OF COLLECTION: ABNORMAL
DEVICE: ABNORMAL
FIO2 ARTERIAL: 60
FIO2: 40 %
FIO2: 40 %
FIO2: 60 %
GFR AFRICAN AMERICAN: >60
GFR NON-AFRICAN AMERICAN: >60 ML/MIN/1.73
GLUCOSE BLD-MCNC: 165 MG/DL (ref 74–99)
HCO3 ARTERIAL: 20.4 MMOL/L (ref 22–26)
HCO3 ARTERIAL: 20.8 MMOL/L (ref 22–26)
HCO3 ARTERIAL: 22.1 MMOL/L (ref 22–26)
HCO3 ARTERIAL: 22.8 MMOL/L (ref 22–26)
HCO3: 18 MMOL/L (ref 22–26)
HCO3: 18 MMOL/L (ref 22–26)
HCO3: 18.9 MMOL/L (ref 22–26)
HCO3: 21.2 MMOL/L (ref 22–26)
HCT (EST): 21 % (ref 37–54)
HCT (EST): 23 % (ref 37–54)
HCT (EST): 24 % (ref 37–54)
HCT (EST): 26 % (ref 37–54)
HCT VFR BLD CALC: 27.2 % (ref 37–54)
HCT VFR BLD CALC: 37.5 % (ref 37–54)
HEMOGLOBIN: 12.9 G/DL (ref 12.5–16.5)
HEMOGLOBIN: 9.3 G/DL (ref 12.5–16.5)
HGB, (EST): 7.3 G/DL (ref 12.5–15.5)
HGB, (EST): 7.7 G/DL (ref 12.5–15.5)
HGB, (EST): 8 G/DL (ref 12.5–15.5)
HGB, (EST): 9 G/DL (ref 12.5–15.5)
HHB: 2.1 % (ref 0–5)
HHB: 2.2 % (ref 0–5)
HHB: 4.2 % (ref 0–5)
HHB: 8.3 % (ref 0–5)
INR BLD: 1.3
LAB: ABNORMAL
MAGNESIUM: 2.9 MG/DL (ref 1.6–2.6)
MCH RBC QN AUTO: 29.7 PG (ref 26–35)
MCH RBC QN AUTO: 29.8 PG (ref 26–35)
MCHC RBC AUTO-ENTMCNC: 34.2 % (ref 32–34.5)
MCHC RBC AUTO-ENTMCNC: 34.4 % (ref 32–34.5)
MCV RBC AUTO: 86.4 FL (ref 80–99.9)
MCV RBC AUTO: 87.2 FL (ref 80–99.9)
METER GLUCOSE: 138 MG/DL (ref 74–99)
METER GLUCOSE: 163 MG/DL (ref 74–99)
METER GLUCOSE: 172 MG/DL (ref 74–99)
METER GLUCOSE: 172 MG/DL (ref 74–99)
METER GLUCOSE: 213 MG/DL (ref 74–99)
METER GLUCOSE: 241 MG/DL (ref 74–99)
METER GLUCOSE: 241 MG/DL (ref 74–99)
METER GLUCOSE: 263 MG/DL (ref 74–99)
METER GLUCOSE: 276 MG/DL (ref 74–99)
METER GLUCOSE: 280 MG/DL (ref 74–99)
METER GLUCOSE: 282 MG/DL (ref 74–99)
METER GLUCOSE: 325 MG/DL (ref 74–99)
METHB: 0.2 % (ref 0–1.5)
METHB: 0.5 % (ref 0–1.5)
METHB: 0.5 % (ref 0–1.5)
METHB: 0.7 % (ref 0–1.5)
MODE: ABNORMAL
MODE: ABNORMAL
MODE: AC
MODE: AC
O2 SATURATION: 100 % (ref 92–98.5)
O2 SATURATION: 91.7 % (ref 92–98.5)
O2 SATURATION: 95.8 % (ref 92–98.5)
O2 SATURATION: 97.8 % (ref 92–98.5)
O2 SATURATION: 97.9 % (ref 92–98.5)
O2 SATURATION: 99.5 % (ref 92–98.5)
O2HB: 91.3 % (ref 94–97)
O2HB: 95 % (ref 94–97)
O2HB: 96.9 % (ref 94–97)
O2HB: 97.1 % (ref 94–97)
OPERATOR ID: 786
OPERATOR ID: 925
OPERATOR ID: ABNORMAL
PATIENT TEMP: 37 C
PCO2 ARTERIAL: 34.6 MMHG (ref 35–45)
PCO2 ARTERIAL: 36.2 MMHG (ref 35–45)
PCO2 ARTERIAL: 38 MMHG (ref 35–45)
PCO2 ARTERIAL: 39.9 MMHG (ref 35–45)
PCO2: 32 MMHG (ref 35–45)
PCO2: 32.2 MMHG (ref 35–45)
PCO2: 33.9 MMHG (ref 35–45)
PCO2: 40.9 MMHG (ref 35–45)
PDW BLD-RTO: 14.2 FL (ref 11.5–15)
PDW BLD-RTO: 14.4 FL (ref 11.5–15)
PEEP/CPAP: 5 CMH2O
PFO2: 2.27 MMHG/%
PFO2: 2.59 MMHG/%
PFO2: 3.54 MMHG/%
PH BLOOD GAS: 7.33 (ref 7.35–7.45)
PH BLOOD GAS: 7.36 (ref 7.35–7.45)
PH BLOOD GAS: 7.37 (ref 7.35–7.45)
PH BLOOD GAS: 7.38 (ref 7.35–7.45)
PLATELET # BLD: 136 E9/L (ref 130–450)
PLATELET # BLD: 240 E9/L (ref 130–450)
PMV BLD AUTO: 9.1 FL (ref 7–12)
PMV BLD AUTO: 9.2 FL (ref 7–12)
PO2 ARTERIAL: 167.9 MMHG (ref 60–80)
PO2 ARTERIAL: 509.4 MMHG (ref 60–80)
PO2 ARTERIAL: 578.4 MMHG (ref 60–80)
PO2 ARTERIAL: 580.8 MMHG (ref 60–80)
PO2: 141.6 MMHG (ref 75–100)
PO2: 155.3 MMHG (ref 75–100)
PO2: 65.7 MMHG (ref 75–100)
PO2: 90.8 MMHG (ref 75–100)
POTASSIUM SERPL-SCNC: 3.92 MMOL/L (ref 3.5–5)
POTASSIUM SERPL-SCNC: 4.3 MMOL/L (ref 3.5–5.5)
POTASSIUM SERPL-SCNC: 4.4 MMOL/L (ref 3.5–5)
POTASSIUM SERPL-SCNC: 4.61 MMOL/L (ref 3.5–5)
POTASSIUM SERPL-SCNC: 5 MMOL/L (ref 3.5–5)
POTASSIUM SERPL-SCNC: 5.8 MMOL/L (ref 3.5–5.5)
POTASSIUM SERPL-SCNC: 6.2 MMOL/L (ref 3.5–5.5)
POTASSIUM SERPL-SCNC: 6.3 MMOL/L (ref 3.5–5.5)
PROTHROMBIN TIME: 14.8 SEC (ref 9.3–12.4)
PS: 10 CMH20
RBC # BLD: 3.12 E12/L (ref 3.8–5.8)
RBC # BLD: 4.34 E12/L (ref 3.8–5.8)
RI(T): 0.67
RI(T): 1.43
RI(T): 1.63
RR MECHANICAL: 12 B/MIN
RR MECHANICAL: 12 B/MIN
SODIUM BLD-SCNC: 140 MMOL/L (ref 132–146)
SOURCE, BLOOD GAS: ABNORMAL
THB: 8.6 G/DL (ref 11.5–16.5)
THB: 8.7 G/DL (ref 11.5–16.5)
THB: 8.8 G/DL (ref 11.5–16.5)
THB: 9.7 G/DL (ref 11.5–16.5)
TIME ANALYZED: 1315
TIME ANALYZED: 1615
TIME ANALYZED: 1722
TIME ANALYZED: 1914
VT MECHANICAL: 525 ML
VT MECHANICAL: 550 ML
WBC # BLD: 13.2 E9/L (ref 4.5–11.5)
WBC # BLD: 23.3 E9/L (ref 4.5–11.5)

## 2021-01-22 PROCEDURE — 6370000000 HC RX 637 (ALT 250 FOR IP): Performed by: THORACIC SURGERY (CARDIOTHORACIC VASCULAR SURGERY)

## 2021-01-22 PROCEDURE — 85347 COAGULATION TIME ACTIVATED: CPT

## 2021-01-22 PROCEDURE — 82805 BLOOD GASES W/O2 SATURATION: CPT

## 2021-01-22 PROCEDURE — 36415 COLL VENOUS BLD VENIPUNCTURE: CPT

## 2021-01-22 PROCEDURE — 37799 UNLISTED PX VASCULAR SURGERY: CPT

## 2021-01-22 PROCEDURE — 83735 ASSAY OF MAGNESIUM: CPT

## 2021-01-22 PROCEDURE — 33518 CABG ARTERY-VEIN TWO: CPT | Performed by: THORACIC SURGERY (CARDIOTHORACIC VASCULAR SURGERY)

## 2021-01-22 PROCEDURE — 06BP4ZZ EXCISION OF RIGHT SAPHENOUS VEIN, PERCUTANEOUS ENDOSCOPIC APPROACH: ICD-10-PCS | Performed by: THORACIC SURGERY (CARDIOTHORACIC VASCULAR SURGERY)

## 2021-01-22 PROCEDURE — 6370000000 HC RX 637 (ALT 250 FOR IP)

## 2021-01-22 PROCEDURE — 6360000002 HC RX W HCPCS: Performed by: PHYSICIAN ASSISTANT

## 2021-01-22 PROCEDURE — 2580000003 HC RX 258: Performed by: THORACIC SURGERY (CARDIOTHORACIC VASCULAR SURGERY)

## 2021-01-22 PROCEDURE — 7100000000 HC PACU RECOVERY - FIRST 15 MIN

## 2021-01-22 PROCEDURE — 3600000018 HC SURGERY OHS ADDTL 15MIN: Performed by: THORACIC SURGERY (CARDIOTHORACIC VASCULAR SURGERY)

## 2021-01-22 PROCEDURE — 6360000002 HC RX W HCPCS: Performed by: THORACIC SURGERY (CARDIOTHORACIC VASCULAR SURGERY)

## 2021-01-22 PROCEDURE — P9045 ALBUMIN (HUMAN), 5%, 250 ML: HCPCS

## 2021-01-22 PROCEDURE — C1729 CATH, DRAINAGE: HCPCS | Performed by: THORACIC SURGERY (CARDIOTHORACIC VASCULAR SURGERY)

## 2021-01-22 PROCEDURE — 2700000000 HC OXYGEN THERAPY PER DAY

## 2021-01-22 PROCEDURE — 2709999900 HC NON-CHARGEABLE SUPPLY: Performed by: THORACIC SURGERY (CARDIOTHORACIC VASCULAR SURGERY)

## 2021-01-22 PROCEDURE — 94640 AIRWAY INHALATION TREATMENT: CPT

## 2021-01-22 PROCEDURE — 85610 PROTHROMBIN TIME: CPT

## 2021-01-22 PROCEDURE — 85027 COMPLETE CBC AUTOMATED: CPT

## 2021-01-22 PROCEDURE — 94799 UNLISTED PULMONARY SVC/PX: CPT

## 2021-01-22 PROCEDURE — 2580000003 HC RX 258

## 2021-01-22 PROCEDURE — 33533 CABG ARTERIAL SINGLE: CPT | Performed by: THORACIC SURGERY (CARDIOTHORACIC VASCULAR SURGERY)

## 2021-01-22 PROCEDURE — 3600000008 HC SURGERY OHS BASE: Performed by: THORACIC SURGERY (CARDIOTHORACIC VASCULAR SURGERY)

## 2021-01-22 PROCEDURE — 80048 BASIC METABOLIC PNL TOTAL CA: CPT

## 2021-01-22 PROCEDURE — 6360000002 HC RX W HCPCS: Performed by: INTERNAL MEDICINE

## 2021-01-22 PROCEDURE — 2500000003 HC RX 250 WO HCPCS

## 2021-01-22 PROCEDURE — 3700000000 HC ANESTHESIA ATTENDED CARE: Performed by: THORACIC SURGERY (CARDIOTHORACIC VASCULAR SURGERY)

## 2021-01-22 PROCEDURE — 2720000010 HC SURG SUPPLY STERILE: Performed by: THORACIC SURGERY (CARDIOTHORACIC VASCULAR SURGERY)

## 2021-01-22 PROCEDURE — 6360000002 HC RX W HCPCS

## 2021-01-22 PROCEDURE — 71045 X-RAY EXAM CHEST 1 VIEW: CPT

## 2021-01-22 PROCEDURE — 84132 ASSAY OF SERUM POTASSIUM: CPT

## 2021-01-22 PROCEDURE — 021109W BYPASS CORONARY ARTERY, TWO ARTERIES FROM AORTA WITH AUTOLOGOUS VENOUS TISSUE, OPEN APPROACH: ICD-10-PCS | Performed by: THORACIC SURGERY (CARDIOTHORACIC VASCULAR SURGERY)

## 2021-01-22 PROCEDURE — 06BQ4ZZ EXCISION OF LEFT SAPHENOUS VEIN, PERCUTANEOUS ENDOSCOPIC APPROACH: ICD-10-PCS | Performed by: THORACIC SURGERY (CARDIOTHORACIC VASCULAR SURGERY)

## 2021-01-22 PROCEDURE — 33508 ENDOSCOPIC VEIN HARVEST: CPT | Performed by: THORACIC SURGERY (CARDIOTHORACIC VASCULAR SURGERY)

## 2021-01-22 PROCEDURE — 7100000001 HC PACU RECOVERY - ADDTL 15 MIN

## 2021-01-22 PROCEDURE — 3700000001 HC ADD 15 MINUTES (ANESTHESIA): Performed by: THORACIC SURGERY (CARDIOTHORACIC VASCULAR SURGERY)

## 2021-01-22 PROCEDURE — 5A1221Z PERFORMANCE OF CARDIAC OUTPUT, CONTINUOUS: ICD-10-PCS | Performed by: THORACIC SURGERY (CARDIOTHORACIC VASCULAR SURGERY)

## 2021-01-22 PROCEDURE — 2000000000 HC ICU R&B

## 2021-01-22 PROCEDURE — B246ZZ4 ULTRASONOGRAPHY OF RIGHT AND LEFT HEART, TRANSESOPHAGEAL: ICD-10-PCS | Performed by: ANESTHESIOLOGY

## 2021-01-22 PROCEDURE — 85730 THROMBOPLASTIN TIME PARTIAL: CPT

## 2021-01-22 PROCEDURE — 99233 SBSQ HOSP IP/OBS HIGH 50: CPT | Performed by: NURSE PRACTITIONER

## 2021-01-22 PROCEDURE — C9113 INJ PANTOPRAZOLE SODIUM, VIA: HCPCS | Performed by: THORACIC SURGERY (CARDIOTHORACIC VASCULAR SURGERY)

## 2021-01-22 PROCEDURE — 82962 GLUCOSE BLOOD TEST: CPT

## 2021-01-22 PROCEDURE — 02100Z9 BYPASS CORONARY ARTERY, ONE ARTERY FROM LEFT INTERNAL MAMMARY, OPEN APPROACH: ICD-10-PCS | Performed by: THORACIC SURGERY (CARDIOTHORACIC VASCULAR SURGERY)

## 2021-01-22 PROCEDURE — 82803 BLOOD GASES ANY COMBINATION: CPT

## 2021-01-22 PROCEDURE — 94002 VENT MGMT INPAT INIT DAY: CPT

## 2021-01-22 PROCEDURE — C1713 ANCHOR/SCREW BN/BN,TIS/BN: HCPCS | Performed by: THORACIC SURGERY (CARDIOTHORACIC VASCULAR SURGERY)

## 2021-01-22 DEVICE — PLATE BONE 1.8MM THICKNESS STRNL LCK 6 H CP TI: Type: IMPLANTABLE DEVICE | Site: STERNUM | Status: FUNCTIONAL

## 2021-01-22 DEVICE — SCREW BNE L11MM DIA2.3MM THOR STRNL TI LOK DRL FREE LEV 1: Type: IMPLANTABLE DEVICE | Site: STERNUM | Status: FUNCTIONAL

## 2021-01-22 DEVICE — PLATE LCK STRNL 8-H LAD T 1.8MM: Type: IMPLANTABLE DEVICE | Site: STERNUM | Status: FUNCTIONAL

## 2021-01-22 DEVICE — SCREW BNE L9MM DIA2.3MM THOR STRNL TI LOK DRL FREE LEV 1: Type: IMPLANTABLE DEVICE | Site: STERNUM | Status: FUNCTIONAL

## 2021-01-22 RX ORDER — MIDAZOLAM HYDROCHLORIDE 1 MG/ML
INJECTION INTRAMUSCULAR; INTRAVENOUS PRN
Status: DISCONTINUED | OUTPATIENT
Start: 2021-01-22 | End: 2021-01-22 | Stop reason: SDUPTHER

## 2021-01-22 RX ORDER — SODIUM CHLORIDE 9 MG/ML
10 INJECTION INTRAVENOUS DAILY
Status: COMPLETED | OUTPATIENT
Start: 2021-01-22 | End: 2021-01-22

## 2021-01-22 RX ORDER — ONDANSETRON 2 MG/ML
4 INJECTION INTRAMUSCULAR; INTRAVENOUS EVERY 8 HOURS PRN
Status: DISCONTINUED | OUTPATIENT
Start: 2021-01-22 | End: 2021-01-23

## 2021-01-22 RX ORDER — SODIUM CHLORIDE 9 MG/ML
30 INJECTION, SOLUTION INTRAVENOUS CONTINUOUS
Status: DISCONTINUED | OUTPATIENT
Start: 2021-01-22 | End: 2021-01-23

## 2021-01-22 RX ORDER — OXYCODONE HYDROCHLORIDE 5 MG/1
5 TABLET ORAL EVERY 4 HOURS PRN
Status: DISCONTINUED | OUTPATIENT
Start: 2021-01-22 | End: 2021-01-23

## 2021-01-22 RX ORDER — ACETAMINOPHEN 325 MG/1
650 TABLET ORAL EVERY 4 HOURS PRN
Status: DISCONTINUED | OUTPATIENT
Start: 2021-01-22 | End: 2021-01-23

## 2021-01-22 RX ORDER — ALBUMIN, HUMAN INJ 5% 5 %
25 SOLUTION INTRAVENOUS PRN
Status: DISCONTINUED | OUTPATIENT
Start: 2021-01-22 | End: 2021-01-23

## 2021-01-22 RX ORDER — CALCIUM CHLORIDE 100 MG/ML
INJECTION INTRAVENOUS; INTRAVENTRICULAR PRN
Status: DISCONTINUED | OUTPATIENT
Start: 2021-01-22 | End: 2021-01-22 | Stop reason: SDUPTHER

## 2021-01-22 RX ORDER — ALBUMIN, HUMAN INJ 5% 5 %
SOLUTION INTRAVENOUS
Status: DISCONTINUED
Start: 2021-01-22 | End: 2021-01-22

## 2021-01-22 RX ORDER — AMINOCAPROIC ACID 250 MG/ML
INJECTION, SOLUTION INTRAVENOUS PRN
Status: DISCONTINUED | OUTPATIENT
Start: 2021-01-22 | End: 2021-01-22 | Stop reason: SDUPTHER

## 2021-01-22 RX ORDER — FENTANYL CITRATE 50 UG/ML
25 INJECTION, SOLUTION INTRAMUSCULAR; INTRAVENOUS
Status: DISCONTINUED | OUTPATIENT
Start: 2021-01-22 | End: 2021-01-23

## 2021-01-22 RX ORDER — PANTOPRAZOLE SODIUM 40 MG/1
40 TABLET, DELAYED RELEASE ORAL DAILY
Status: DISCONTINUED | OUTPATIENT
Start: 2021-01-23 | End: 2021-02-01 | Stop reason: HOSPADM

## 2021-01-22 RX ORDER — DEXTROSE MONOHYDRATE 50 MG/ML
100 INJECTION, SOLUTION INTRAVENOUS PRN
Status: DISCONTINUED | OUTPATIENT
Start: 2021-01-22 | End: 2021-01-23

## 2021-01-22 RX ORDER — NEOSTIGMINE METHYLSULFATE 1 MG/ML
INJECTION, SOLUTION INTRAVENOUS PRN
Status: DISCONTINUED | OUTPATIENT
Start: 2021-01-22 | End: 2021-01-22 | Stop reason: SDUPTHER

## 2021-01-22 RX ORDER — CLOPIDOGREL BISULFATE 75 MG/1
75 TABLET ORAL DAILY
Status: DISCONTINUED | OUTPATIENT
Start: 2021-01-23 | End: 2021-01-28

## 2021-01-22 RX ORDER — NICOTINE POLACRILEX 4 MG
15 LOZENGE BUCCAL PRN
Status: DISCONTINUED | OUTPATIENT
Start: 2021-01-22 | End: 2021-01-23

## 2021-01-22 RX ORDER — SODIUM CHLORIDE, SODIUM LACTATE, POTASSIUM CHLORIDE, CALCIUM CHLORIDE 600; 310; 30; 20 MG/100ML; MG/100ML; MG/100ML; MG/100ML
INJECTION, SOLUTION INTRAVENOUS CONTINUOUS PRN
Status: DISCONTINUED | OUTPATIENT
Start: 2021-01-22 | End: 2021-01-22 | Stop reason: SDUPTHER

## 2021-01-22 RX ORDER — ETOMIDATE 2 MG/ML
INJECTION INTRAVENOUS PRN
Status: DISCONTINUED | OUTPATIENT
Start: 2021-01-22 | End: 2021-01-22 | Stop reason: SDUPTHER

## 2021-01-22 RX ORDER — PROTAMINE SULFATE 10 MG/ML
INJECTION, SOLUTION INTRAVENOUS PRN
Status: DISCONTINUED | OUTPATIENT
Start: 2021-01-22 | End: 2021-01-22 | Stop reason: SDUPTHER

## 2021-01-22 RX ORDER — CHLORHEXIDINE GLUCONATE 0.12 MG/ML
15 RINSE ORAL 2 TIMES DAILY
Status: DISCONTINUED | OUTPATIENT
Start: 2021-01-22 | End: 2021-01-23

## 2021-01-22 RX ORDER — ASPIRIN 300 MG/1
300 SUPPOSITORY RECTAL ONCE
Status: COMPLETED | OUTPATIENT
Start: 2021-01-22 | End: 2021-01-22

## 2021-01-22 RX ORDER — ASPIRIN 81 MG/1
81 TABLET ORAL DAILY
Status: DISCONTINUED | OUTPATIENT
Start: 2021-01-23 | End: 2021-01-23

## 2021-01-22 RX ORDER — PROPOFOL 10 MG/ML
INJECTION, EMULSION INTRAVENOUS
Status: DISCONTINUED
Start: 2021-01-22 | End: 2021-01-22

## 2021-01-22 RX ORDER — OXYCODONE HYDROCHLORIDE 10 MG/1
10 TABLET ORAL EVERY 4 HOURS PRN
Status: DISCONTINUED | OUTPATIENT
Start: 2021-01-22 | End: 2021-01-23

## 2021-01-22 RX ORDER — INSULIN GLARGINE 100 [IU]/ML
0.15 INJECTION, SOLUTION SUBCUTANEOUS NIGHTLY
Status: DISCONTINUED | OUTPATIENT
Start: 2021-01-23 | End: 2021-01-23

## 2021-01-22 RX ORDER — ROSUVASTATIN CALCIUM 20 MG/1
20 TABLET, COATED ORAL NIGHTLY
Status: DISCONTINUED | OUTPATIENT
Start: 2021-01-23 | End: 2021-02-01 | Stop reason: HOSPADM

## 2021-01-22 RX ORDER — SENNA AND DOCUSATE SODIUM 50; 8.6 MG/1; MG/1
1 TABLET, FILM COATED ORAL 2 TIMES DAILY
Status: DISCONTINUED | OUTPATIENT
Start: 2021-01-22 | End: 2021-01-23

## 2021-01-22 RX ORDER — POTASSIUM CHLORIDE 29.8 MG/ML
20 INJECTION INTRAVENOUS PRN
Status: DISCONTINUED | OUTPATIENT
Start: 2021-01-22 | End: 2021-01-23

## 2021-01-22 RX ORDER — VECURONIUM BROMIDE 1 MG/ML
INJECTION, POWDER, LYOPHILIZED, FOR SOLUTION INTRAVENOUS PRN
Status: DISCONTINUED | OUTPATIENT
Start: 2021-01-22 | End: 2021-01-22 | Stop reason: SDUPTHER

## 2021-01-22 RX ORDER — HEPARIN SODIUM 10000 [USP'U]/ML
INJECTION, SOLUTION INTRAVENOUS; SUBCUTANEOUS PRN
Status: DISCONTINUED | OUTPATIENT
Start: 2021-01-22 | End: 2021-01-22 | Stop reason: SDUPTHER

## 2021-01-22 RX ORDER — MEPERIDINE HYDROCHLORIDE 50 MG/ML
25 INJECTION INTRAMUSCULAR; INTRAVENOUS; SUBCUTANEOUS
Status: ACTIVE | OUTPATIENT
Start: 2021-01-22 | End: 2021-01-22

## 2021-01-22 RX ORDER — SODIUM CHLORIDE 0.9 % (FLUSH) 0.9 %
10 SYRINGE (ML) INJECTION EVERY 12 HOURS SCHEDULED
Status: DISCONTINUED | OUTPATIENT
Start: 2021-01-22 | End: 2021-01-23

## 2021-01-22 RX ORDER — SODIUM CHLORIDE 0.9 % (FLUSH) 0.9 %
10 SYRINGE (ML) INJECTION PRN
Status: DISCONTINUED | OUTPATIENT
Start: 2021-01-22 | End: 2021-01-23

## 2021-01-22 RX ORDER — 0.9 % SODIUM CHLORIDE 0.9 %
250 INTRAVENOUS SOLUTION INTRAVENOUS CONTINUOUS PRN
Status: DISCONTINUED | OUTPATIENT
Start: 2021-01-22 | End: 2021-01-23

## 2021-01-22 RX ORDER — FENTANYL CITRATE 0.05 MG/ML
INJECTION, SOLUTION INTRAMUSCULAR; INTRAVENOUS PRN
Status: DISCONTINUED | OUTPATIENT
Start: 2021-01-22 | End: 2021-01-22 | Stop reason: SDUPTHER

## 2021-01-22 RX ORDER — MAGNESIUM SULFATE IN WATER 40 MG/ML
2000 INJECTION, SOLUTION INTRAVENOUS PRN
Status: DISCONTINUED | OUTPATIENT
Start: 2021-01-22 | End: 2021-01-23

## 2021-01-22 RX ORDER — ACETAMINOPHEN 650 MG/1
650 SUPPOSITORY RECTAL EVERY 4 HOURS PRN
Status: DISCONTINUED | OUTPATIENT
Start: 2021-01-22 | End: 2021-01-23

## 2021-01-22 RX ORDER — FENTANYL CITRATE 50 UG/ML
50 INJECTION, SOLUTION INTRAMUSCULAR; INTRAVENOUS
Status: DISCONTINUED | OUTPATIENT
Start: 2021-01-22 | End: 2021-01-23

## 2021-01-22 RX ORDER — PHENYLEPHRINE HYDROCHLORIDE 10 MG/ML
INJECTION INTRAVENOUS PRN
Status: DISCONTINUED | OUTPATIENT
Start: 2021-01-22 | End: 2021-01-22 | Stop reason: SDUPTHER

## 2021-01-22 RX ORDER — GLYCOPYRROLATE 1 MG/5 ML
SYRINGE (ML) INTRAVENOUS PRN
Status: DISCONTINUED | OUTPATIENT
Start: 2021-01-22 | End: 2021-01-22 | Stop reason: SDUPTHER

## 2021-01-22 RX ORDER — DEXTROSE MONOHYDRATE 25 G/50ML
12.5 INJECTION, SOLUTION INTRAVENOUS PRN
Status: DISCONTINUED | OUTPATIENT
Start: 2021-01-22 | End: 2021-01-23

## 2021-01-22 RX ORDER — IPRATROPIUM BROMIDE AND ALBUTEROL SULFATE 2.5; .5 MG/3ML; MG/3ML
1 SOLUTION RESPIRATORY (INHALATION)
Status: DISCONTINUED | OUTPATIENT
Start: 2021-01-22 | End: 2021-01-23

## 2021-01-22 RX ORDER — SODIUM CHLORIDE 9 MG/ML
INJECTION, SOLUTION INTRAVENOUS CONTINUOUS PRN
Status: DISCONTINUED | OUTPATIENT
Start: 2021-01-22 | End: 2021-01-22 | Stop reason: SDUPTHER

## 2021-01-22 RX ORDER — TAMSULOSIN HYDROCHLORIDE 0.4 MG/1
0.4 CAPSULE ORAL DAILY
Status: DISCONTINUED | OUTPATIENT
Start: 2021-01-23 | End: 2021-02-01 | Stop reason: HOSPADM

## 2021-01-22 RX ORDER — PANTOPRAZOLE SODIUM 40 MG/10ML
40 INJECTION, POWDER, LYOPHILIZED, FOR SOLUTION INTRAVENOUS DAILY
Status: COMPLETED | OUTPATIENT
Start: 2021-01-22 | End: 2021-01-22

## 2021-01-22 RX ORDER — PROPOFOL 10 MG/ML
10 INJECTION, EMULSION INTRAVENOUS CONTINUOUS PRN
Status: DISCONTINUED | OUTPATIENT
Start: 2021-01-22 | End: 2021-01-23

## 2021-01-22 RX ADMIN — METHYLPREDNISOLONE SODIUM SUCCINATE 40 MG: 40 INJECTION, POWDER, FOR SOLUTION INTRAMUSCULAR; INTRAVENOUS at 20:16

## 2021-01-22 RX ADMIN — PHENYLEPHRINE HYDROCHLORIDE 100 MCG: 10 INJECTION INTRAVENOUS at 09:00

## 2021-01-22 RX ADMIN — PROPOFOL 10 MCG/KG/MIN: 10 INJECTION, EMULSION INTRAVENOUS at 12:55

## 2021-01-22 RX ADMIN — SODIUM BICARBONATE 25 MEQ: 84 INJECTION, SOLUTION INTRAVENOUS at 10:04

## 2021-01-22 RX ADMIN — BUDESONIDE 500 MCG: 0.5 SUSPENSION RESPIRATORY (INHALATION) at 20:28

## 2021-01-22 RX ADMIN — MIDAZOLAM 2 MG: 1 INJECTION INTRAMUSCULAR; INTRAVENOUS at 07:00

## 2021-01-22 RX ADMIN — VECURONIUM BROMIDE 4 MG: 10 INJECTION, POWDER, LYOPHILIZED, FOR SOLUTION INTRAVENOUS at 11:59

## 2021-01-22 RX ADMIN — ALBUMIN, HUMAN INJ 5% 25 G: 5 SOLUTION at 13:15

## 2021-01-22 RX ADMIN — Medication 0.6 MG: at 12:59

## 2021-01-22 RX ADMIN — SODIUM CHLORIDE, POTASSIUM CHLORIDE, SODIUM LACTATE AND CALCIUM CHLORIDE: 600; 310; 30; 20 INJECTION, SOLUTION INTRAVENOUS at 06:35

## 2021-01-22 RX ADMIN — Medication 2000 MG: at 18:24

## 2021-01-22 RX ADMIN — MUPIROCIN: 20 OINTMENT TOPICAL at 20:16

## 2021-01-22 RX ADMIN — FENTANYL CITRATE 50 MCG: 50 INJECTION, SOLUTION INTRAMUSCULAR; INTRAVENOUS at 23:53

## 2021-01-22 RX ADMIN — HEPARIN SODIUM 40000 UNITS: 10000 INJECTION INTRAVENOUS; SUBCUTANEOUS at 08:11

## 2021-01-22 RX ADMIN — IPRATROPIUM BROMIDE AND ALBUTEROL SULFATE 1 AMPULE: .5; 3 SOLUTION RESPIRATORY (INHALATION) at 20:27

## 2021-01-22 RX ADMIN — SODIUM CHLORIDE 4 UNITS/HR: 9 INJECTION, SOLUTION INTRAVENOUS at 07:15

## 2021-01-22 RX ADMIN — INSULIN HUMAN 6 UNITS: 100 INJECTION, SOLUTION PARENTERAL at 10:11

## 2021-01-22 RX ADMIN — INSULIN LISPRO 3 UNITS: 100 INJECTION, SOLUTION INTRAVENOUS; SUBCUTANEOUS at 13:18

## 2021-01-22 RX ADMIN — CHLORHEXIDINE GLUCONATE 0.12% ORAL RINSE 15 ML: 1.2 LIQUID ORAL at 05:36

## 2021-01-22 RX ADMIN — PHENYLEPHRINE HYDROCHLORIDE 100 MCG: 10 INJECTION INTRAVENOUS at 08:57

## 2021-01-22 RX ADMIN — ARFORMOTEROL TARTRATE 15 MCG: 15 SOLUTION RESPIRATORY (INHALATION) at 20:28

## 2021-01-22 RX ADMIN — Medication 2 G: at 07:04

## 2021-01-22 RX ADMIN — MUPIROCIN: 20 OINTMENT TOPICAL at 05:36

## 2021-01-22 RX ADMIN — PANTOPRAZOLE SODIUM 40 MG: 40 INJECTION, POWDER, FOR SOLUTION INTRAVENOUS at 13:20

## 2021-01-22 RX ADMIN — FENTANYL CITRATE 25 MCG: 50 INJECTION, SOLUTION INTRAMUSCULAR; INTRAVENOUS at 18:41

## 2021-01-22 RX ADMIN — CHLORHEXIDINE GLUCONATE 0.12% ORAL RINSE 15 ML: 1.2 LIQUID ORAL at 20:21

## 2021-01-22 RX ADMIN — CALCIUM CHLORIDE 1 G: 100 INJECTION, SOLUTION INTRAVENOUS at 11:18

## 2021-01-22 RX ADMIN — PROPOFOL INJECTABLE EMULSION 10 MCG/KG/MIN: 10 INJECTION, EMULSION INTRAVENOUS at 12:55

## 2021-01-22 RX ADMIN — FENTANYL CITRATE 100 MCG: 50 INJECTION, SOLUTION INTRAMUSCULAR; INTRAVENOUS at 06:55

## 2021-01-22 RX ADMIN — FENTANYL CITRATE 200 MCG: 50 INJECTION, SOLUTION INTRAMUSCULAR; INTRAVENOUS at 07:00

## 2021-01-22 RX ADMIN — INSULIN LISPRO 3 UNITS: 100 INJECTION, SOLUTION INTRAVENOUS; SUBCUTANEOUS at 17:20

## 2021-01-22 RX ADMIN — FENTANYL CITRATE 100 MCG: 50 INJECTION, SOLUTION INTRAMUSCULAR; INTRAVENOUS at 07:06

## 2021-01-22 RX ADMIN — VECURONIUM BROMIDE 10 MG: 10 INJECTION, POWDER, LYOPHILIZED, FOR SOLUTION INTRAVENOUS at 07:00

## 2021-01-22 RX ADMIN — SODIUM CHLORIDE 30 ML/HR: 9 INJECTION, SOLUTION INTRAVENOUS at 12:55

## 2021-01-22 RX ADMIN — FENTANYL CITRATE 100 MCG: 50 INJECTION, SOLUTION INTRAMUSCULAR; INTRAVENOUS at 07:11

## 2021-01-22 RX ADMIN — MIDAZOLAM 2 MG: 1 INJECTION INTRAMUSCULAR; INTRAVENOUS at 06:59

## 2021-01-22 RX ADMIN — ONDANSETRON 4 MG: 2 INJECTION INTRAMUSCULAR; INTRAVENOUS at 22:57

## 2021-01-22 RX ADMIN — ASPIRIN 300 MG: 300 SUPPOSITORY RECTAL at 13:20

## 2021-01-22 RX ADMIN — SODIUM CHLORIDE, PRESERVATIVE FREE 10 ML: 5 INJECTION INTRAVENOUS at 19:48

## 2021-01-22 RX ADMIN — IPRATROPIUM BROMIDE AND ALBUTEROL SULFATE 1 AMPULE: .5; 3 SOLUTION RESPIRATORY (INHALATION) at 16:05

## 2021-01-22 RX ADMIN — OXYCODONE HYDROCHLORIDE 5 MG: 5 TABLET ORAL at 22:57

## 2021-01-22 RX ADMIN — SODIUM CHLORIDE, PRESERVATIVE FREE 10 ML: 5 INJECTION INTRAVENOUS at 13:20

## 2021-01-22 RX ADMIN — PHENYLEPHRINE HYDROCHLORIDE 100 MCG: 10 INJECTION INTRAVENOUS at 11:16

## 2021-01-22 RX ADMIN — AMINOCAPROIC ACID 1 G/HR: 250 INJECTION, SOLUTION INTRAVENOUS at 07:12

## 2021-01-22 RX ADMIN — SODIUM CHLORIDE: 9 INJECTION, SOLUTION INTRAVENOUS at 12:23

## 2021-01-22 RX ADMIN — FENTANYL CITRATE 25 MCG: 50 INJECTION, SOLUTION INTRAMUSCULAR; INTRAVENOUS at 19:47

## 2021-01-22 RX ADMIN — SODIUM CHLORIDE: 9 INJECTION, SOLUTION INTRAVENOUS at 06:35

## 2021-01-22 RX ADMIN — FENTANYL CITRATE 25 MCG: 50 INJECTION, SOLUTION INTRAMUSCULAR; INTRAVENOUS at 17:41

## 2021-01-22 RX ADMIN — ETOMIDATE 10 MG: 2 INJECTION, SOLUTION INTRAVENOUS at 07:00

## 2021-01-22 RX ADMIN — INSULIN HUMAN 4 UNITS: 100 INJECTION, SOLUTION PARENTERAL at 09:31

## 2021-01-22 RX ADMIN — FENTANYL CITRATE 300 MCG: 50 INJECTION, SOLUTION INTRAMUSCULAR; INTRAVENOUS at 07:31

## 2021-01-22 RX ADMIN — MUPIROCIN: 20 OINTMENT TOPICAL at 15:08

## 2021-01-22 RX ADMIN — Medication 2 G: at 11:03

## 2021-01-22 RX ADMIN — ALBUMIN (HUMAN) 25 G: 12.5 INJECTION, SOLUTION INTRAVENOUS at 13:15

## 2021-01-22 RX ADMIN — METHYLPREDNISOLONE SODIUM SUCCINATE 40 MG: 40 INJECTION, POWDER, FOR SOLUTION INTRAMUSCULAR; INTRAVENOUS at 05:35

## 2021-01-22 RX ADMIN — PROTAMINE SULFATE 300 MG: 10 INJECTION, SOLUTION INTRAVENOUS at 11:17

## 2021-01-22 RX ADMIN — Medication 3 MG: at 12:59

## 2021-01-22 RX ADMIN — FENTANYL CITRATE 50 MCG: 50 INJECTION, SOLUTION INTRAMUSCULAR; INTRAVENOUS at 20:51

## 2021-01-22 RX ADMIN — HEPARIN SODIUM 10000 UNITS: 10000 INJECTION INTRAVENOUS; SUBCUTANEOUS at 08:25

## 2021-01-22 RX ADMIN — INSULIN HUMAN 8 UNITS: 100 INJECTION, SOLUTION PARENTERAL at 10:46

## 2021-01-22 RX ADMIN — CHLORHEXIDINE GLUCONATE 0.12% ORAL RINSE 15 ML: 1.2 LIQUID ORAL at 13:20

## 2021-01-22 RX ADMIN — FENTANYL CITRATE 200 MCG: 50 INJECTION, SOLUTION INTRAMUSCULAR; INTRAVENOUS at 07:28

## 2021-01-22 RX ADMIN — AMINOCAPROIC ACID 5000 MG: 250 INJECTION, SOLUTION INTRAVENOUS at 07:11

## 2021-01-22 RX ADMIN — CALCIUM GLUCONATE 1000 MG: 98 INJECTION, SOLUTION INTRAVENOUS at 17:20

## 2021-01-22 RX ADMIN — VECURONIUM BROMIDE 10 MG: 10 INJECTION, POWDER, LYOPHILIZED, FOR SOLUTION INTRAVENOUS at 09:03

## 2021-01-22 ASSESSMENT — PULMONARY FUNCTION TESTS
PIF_VALUE: 1
PIF_VALUE: 2
PIF_VALUE: 26
PIF_VALUE: 27
PIF_VALUE: 28
PIF_VALUE: 24
PIF_VALUE: 1
PIF_VALUE: 27
PIF_VALUE: 0
PIF_VALUE: 1
PIF_VALUE: 2
PIF_VALUE: 27
PIF_VALUE: 23
PIF_VALUE: 27
PIF_VALUE: 23
PIF_VALUE: 26
PIF_VALUE: 27
PIF_VALUE: 24
PIF_VALUE: 23
PIF_VALUE: 23
PIF_VALUE: 2
PIF_VALUE: 26
PIF_VALUE: 28
PIF_VALUE: 26
PIF_VALUE: 1
PIF_VALUE: 29
PIF_VALUE: 24
PIF_VALUE: 0
PIF_VALUE: 2
PIF_VALUE: 25
PIF_VALUE: 1
PIF_VALUE: 25
PIF_VALUE: 1
PIF_VALUE: 2
PIF_VALUE: 22
PIF_VALUE: 1
PIF_VALUE: 1
PIF_VALUE: 23
PIF_VALUE: 1
PIF_VALUE: 25
PIF_VALUE: 1
PIF_VALUE: 1
PIF_VALUE: 23
PIF_VALUE: 29
PIF_VALUE: 2
PIF_VALUE: 28
PIF_VALUE: 24
PIF_VALUE: 1
PIF_VALUE: 22
PIF_VALUE: 26
PIF_VALUE: 1
PIF_VALUE: 27
PIF_VALUE: 27
PIF_VALUE: 24
PIF_VALUE: 1
PIF_VALUE: 1
PIF_VALUE: 24
PIF_VALUE: 26
PIF_VALUE: 24
PIF_VALUE: 29
PIF_VALUE: 1
PIF_VALUE: 28
PIF_VALUE: 1
PIF_VALUE: 24
PIF_VALUE: 2
PIF_VALUE: 1
PIF_VALUE: 1
PIF_VALUE: 25
PIF_VALUE: 0
PIF_VALUE: 2
PIF_VALUE: 1
PIF_VALUE: 29
PIF_VALUE: 0
PIF_VALUE: 27
PIF_VALUE: 22
PIF_VALUE: 28
PIF_VALUE: 23
PIF_VALUE: 1
PIF_VALUE: 1
PIF_VALUE: 29
PIF_VALUE: 29
PIF_VALUE: 25
PIF_VALUE: 1
PIF_VALUE: 25
PIF_VALUE: 0
PIF_VALUE: 23
PIF_VALUE: 29
PIF_VALUE: 32
PIF_VALUE: 24
PIF_VALUE: 25
PIF_VALUE: 27
PIF_VALUE: 1
PIF_VALUE: 25
PIF_VALUE: 24
PIF_VALUE: 1
PIF_VALUE: 27
PIF_VALUE: 0
PIF_VALUE: 1
PIF_VALUE: 0
PIF_VALUE: 27
PIF_VALUE: 1
PIF_VALUE: 1
PIF_VALUE: 2
PIF_VALUE: 27
PIF_VALUE: 27
PIF_VALUE: 26
PIF_VALUE: 28
PIF_VALUE: 27
PIF_VALUE: 29
PIF_VALUE: 28
PIF_VALUE: 24
PIF_VALUE: 1
PIF_VALUE: 24
PIF_VALUE: 1
PIF_VALUE: 25
PIF_VALUE: 25
PIF_VALUE: 27
PIF_VALUE: 25
PIF_VALUE: 19
PIF_VALUE: 1
PIF_VALUE: 22
PIF_VALUE: 23
PIF_VALUE: 25
PIF_VALUE: 0
PIF_VALUE: 27
PIF_VALUE: 27
PIF_VALUE: 26
PIF_VALUE: 2
PIF_VALUE: 1
PIF_VALUE: 1
PIF_VALUE: 28
PIF_VALUE: 25
PIF_VALUE: 1
PIF_VALUE: 24
PIF_VALUE: 1
PIF_VALUE: 1
PIF_VALUE: 29
PIF_VALUE: 24
PIF_VALUE: 24
PIF_VALUE: 1
PIF_VALUE: 25
PIF_VALUE: 28
PIF_VALUE: 28
PIF_VALUE: 24
PIF_VALUE: 23
PIF_VALUE: 22
PIF_VALUE: 27
PIF_VALUE: 26
PIF_VALUE: 1
PIF_VALUE: 25
PIF_VALUE: 25
PIF_VALUE: 27
PIF_VALUE: 1
PIF_VALUE: 24
PIF_VALUE: 28
PIF_VALUE: 1
PIF_VALUE: 21
PIF_VALUE: 26
PIF_VALUE: 1
PIF_VALUE: 24
PIF_VALUE: 1
PIF_VALUE: 24
PIF_VALUE: 0
PIF_VALUE: 30
PIF_VALUE: 2
PIF_VALUE: 1
PIF_VALUE: 22
PIF_VALUE: 24
PIF_VALUE: 24
PIF_VALUE: 22
PIF_VALUE: 24
PIF_VALUE: 1
PIF_VALUE: 31
PIF_VALUE: 1
PIF_VALUE: 27
PIF_VALUE: 1
PIF_VALUE: 1
PIF_VALUE: 27
PIF_VALUE: 24
PIF_VALUE: 1
PIF_VALUE: 29
PIF_VALUE: 29
PIF_VALUE: 27
PIF_VALUE: 22
PIF_VALUE: 25
PIF_VALUE: 25
PIF_VALUE: 1
PIF_VALUE: 1
PIF_VALUE: 24
PIF_VALUE: 27
PIF_VALUE: 24
PIF_VALUE: 24
PIF_VALUE: 1
PIF_VALUE: 1
PIF_VALUE: 2
PIF_VALUE: 26
PIF_VALUE: 1
PIF_VALUE: 26
PIF_VALUE: 24
PIF_VALUE: 24
PIF_VALUE: 30
PIF_VALUE: 1
PIF_VALUE: 1
PIF_VALUE: 29
PIF_VALUE: 0
PIF_VALUE: 29
PIF_VALUE: 1
PIF_VALUE: 25
PIF_VALUE: 24
PIF_VALUE: 19
PIF_VALUE: 21
PIF_VALUE: 22
PIF_VALUE: 25
PIF_VALUE: 1
PIF_VALUE: 23
PIF_VALUE: 25
PIF_VALUE: 26
PIF_VALUE: 28
PIF_VALUE: 24
PIF_VALUE: 30
PIF_VALUE: 1
PIF_VALUE: 27
PIF_VALUE: 29
PIF_VALUE: 30

## 2021-01-22 ASSESSMENT — PAIN DESCRIPTION - ORIENTATION
ORIENTATION: MID

## 2021-01-22 ASSESSMENT — PAIN DESCRIPTION - DESCRIPTORS
DESCRIPTORS: CONSTANT
DESCRIPTORS: CONSTANT

## 2021-01-22 ASSESSMENT — PAIN SCALES - GENERAL
PAINLEVEL_OUTOF10: 6
PAINLEVEL_OUTOF10: 9
PAINLEVEL_OUTOF10: 6
PAINLEVEL_OUTOF10: 8
PAINLEVEL_OUTOF10: 6

## 2021-01-22 ASSESSMENT — PAIN DESCRIPTION - PROGRESSION
CLINICAL_PROGRESSION: GRADUALLY IMPROVING
CLINICAL_PROGRESSION: GRADUALLY IMPROVING

## 2021-01-22 ASSESSMENT — PAIN DESCRIPTION - FREQUENCY: FREQUENCY: CONTINUOUS

## 2021-01-22 ASSESSMENT — PAIN DESCRIPTION - ONSET
ONSET: ON-GOING

## 2021-01-22 ASSESSMENT — PAIN DESCRIPTION - PAIN TYPE
TYPE: SURGICAL PAIN
TYPE: SURGICAL PAIN

## 2021-01-22 ASSESSMENT — PAIN DESCRIPTION - LOCATION: LOCATION: CHEST

## 2021-01-22 NOTE — PROGRESS NOTES
Patient was extubated to 5 liters/min via nasal cannula. Breath Sounds post extubation were diminished and equal. Stridor was not present post extubation. SPO2 was 93%.       Performed by  Dalton Munoz RRT

## 2021-01-22 NOTE — ANESTHESIA PROCEDURE NOTES
Central Venous Line:    A central venous line was placed using ultrasound guidance and surface landmarks, in the OR for the following indication(s): central venous access and CVP monitoring. Sterility preparation included the following: hand hygiene performed prior to procedure, maximum sterile barriers used and sterile technique used to drape from head to toe. The patient was placed in Trendelenburg position. The right internal jugular vein was prepped. The site was prepped with Chloraprep. A 8.5 Fr (size), 10 (length), introducer slick was placed. During the procedure, the following specific steps were taken: target vein identified, needle advanced into vein and blood aspirated and guidewire advanced into vein. Intravenous verification was obtained by ultrasound and venous blood return. Post insertion care included: all ports aspirated, all ports flushed easily, guidewire removed intact, Biopatch applied, line sutured in place and dressing applied. During the procedure the patient experienced: patient tolerated procedure well with no complications.       Anesthesia type: local..No  Staffing  Performed: Anesthesiologist   Anesthesiologist: Carlos Cohn MD  Preanesthetic Checklist  Completed: patient identified, IV checked, site marked, risks and benefits discussed, surgical consent, monitors and equipment checked, pre-op evaluation, timeout performed, anesthesia consent given, oxygen available and patient being monitored

## 2021-01-22 NOTE — PROGRESS NOTES
Patient has been taken to OR for CABG. Patient will switch service to CT surgery as primary. I will sign off. Please consult as needed.

## 2021-01-22 NOTE — PROGRESS NOTES
CVICU Admission Note    Name: Dolores Obando  MRN: [id number]    CC: Postoperative Critical Care Management     Indication for Surgery/Procedure: CAD  LVEF:  55%    RVF:  Normal     Important/Relevant PMH/PSH: HTN, HLD, DMII, restrictive lung disease, recent covid 19 pneumonia 12/2020,bilateral PE 2007,  psoriatic arthritis, former smoker quit 2001, GERD        Procedure/Surgeries: 1/22/2021   CABG x3 (LIMA-LAD, SVG-Ramus, SVG-RCA)  EVH  KLS Plating     Findings: Circumferential pericardial adhesions (Dressler's), atretic severely calcified distal coronaries, fragile/poor tissue quality including sternum    Pacing wires: Ventricular       Physical Exam:    BP (!) 111/52   Pulse 73   Temp 97.5 °F (36.4 °C) (Bladder)   Resp 14   Ht 5' 7\" (1.702 m)   Wt 189 lb (85.7 kg)   SpO2 99%   BMI 29.60 kg/m²     Recent Labs     01/22/21  1250   WBC 23.3*   RBC 3.12*   HGB 9.3*   HCT 27.2*   MCV 87.2   MCH 29.8   MCHC 34.2   RDW 14.4      MPV 9.1     Recent Labs     01/21/21  1136 01/21/21  1136 01/22/21  1315     --   --    K 4.7   < > 3.92   CL 98  --   --    CO2 23  --   --    BUN 20  --   --    CREATININE 1.4*  --   --    GLUCOSE 411*  --   --    CALCIUM 8.7  --   --     < > = values in this interval not displayed. Post operative CXR:        CXR reviewed. Final Radiology report pending. General Appearance: Arrived to ICU in stable condition on ventilator   Eyes: PERRL  Pulmonary: Diminished bibasilar.   No wheezes, no accessory muscle use noted   Ventilator: Mode: AC/VC, 60 FiO2, 5 PEEP, 550 Vt   Cardiovascular: RRR, no heaves or thrills palpated   Telemetry: SR with occasional PVCs  Abdomen: Soft, OG to LIWS   Extremities: BLE with DP/PT signals, Palpable pulses all upper extremities, no edema   Neurologic/Psych: sedated   Skin: Warm and dry   Incision: MSI with osvaldo dressing intact, BLE SVG sites with ace wraps on     Assessment/Plan: Day of Surgery     1. CAD S/p CABGx3   - DAPT, crestor - Ancef   - Monitor chest tube output and hemodynamics closely    2. Acute Postoperative Respiratory Insufficiency  - 2/2 surgery, restrictive lung disease, former smoker   - Intubated on lung protective ventilation  - Wean vent settings and extubate patient once awake, following commands, DICKERSON, and no signs of bleeding  - Solumedrol continued   - Nebs   - Pulmonary following  - ABGs per protocol and PRN     3. Acute blood loss anemia  - 2/2 surgery  - Monitor s/s of bleeding    4. Postoperative Hypotension   - prn IVF resuscitation, target SBP~110    5. Acute Post Operative Pain   - PRN fentanyl for pain management until able to take PO     6.  DMII   - Last hemoglobin A1c 6.6  -SSI every 4 hours for glycemic control     Electronically signed by WILVER Banks - CNP on 1/22/2021 at 1:35 PM

## 2021-01-22 NOTE — PROGRESS NOTES
Patient signed out of anesthesia. Patient alert, follows commands, DICKERSON, hands grasps equal bilaterally, restraints discontinued.

## 2021-01-22 NOTE — PLAN OF CARE
Problem: Falls - Risk of:  Goal: Will remain free from falls  Description: Will remain free from falls  Outcome: Met This Shift     Problem:  Activity Intolerance:  Goal: Able to perform prescribed physical activity  Description: Able to perform prescribed physical activity  Outcome: Not Met This Shift     Problem: Anxiety:  Goal: Level of anxiety will decrease  Description: Level of anxiety will decrease  Outcome: Met This Shift     Problem: Cardiac Output - Decreased:  Goal: Cardiac output within specified parameters  Description: Cardiac output within specified parameters  Outcome: Met This Shift  Goal: Hemodynamic stability will improve  Description: Hemodynamic stability will improve  Outcome: Met This Shift     Problem: Fluid Volume - Imbalance:  Goal: Ability to achieve a balanced intake and output will improve  Description: Ability to achieve a balanced intake and output will improve  Outcome: Met This Shift     Problem: Gas Exchange - Impaired:  Goal: Levels of oxygenation will improve  Description: Levels of oxygenation will improve  Outcome: Met This Shift

## 2021-01-22 NOTE — PROGRESS NOTES
Patient received from anesthesia personal, hemodynamic lines leveled and zeroed. Respiratory at bedside connected patient to ventilator. Admission blood work and chest xray complete. Patient post anesthesia.

## 2021-01-22 NOTE — ANESTHESIA PROCEDURE NOTES
Procedure Performed: ELISEO     Start Time:        End Time:      Preanesthesia Checklist:  Patient identified, IV assessed, risks and benefits discussed, monitors and equipment assessed, procedure being performed at surgeon's request and anesthesia consent obtained. General Procedure Information  Diagnostic Indications for Echo:  assessment of surgical repair, hemodynamic monitoring and assessment of valve function  Location performed:  OR  Intubated  Bite block placed  Heart visualized  Probe Insertion:  Easy  Probe Type:  3D and mulitplane  Modalities:  3D, color flow mapping, pulse wave Doppler and continuous wave Doppler    Echocardiographic and Doppler Measurements    Ventricles    Right Ventricle:  Cavity size normal.  Hypertrophy not present. Thrombus not present. Global function normal.    Left Ventricle:  Cavity size normal.  Hypertrophy present. Thrombus not present. Global Function normal.      Ventricular Regional Function:  1- Basal Anteroseptal:  normal  2- Basal Anterior:  normal  3- Basal Anterolateral:  normal  4- Basal Inferolateral:  normal  5- Basal Inferior:  normal  6- Basal Inferoseptal:  normal  7- Mid Anteroseptal:  normal  8- Mid Anterior:  normal  9- Mid Anterolateral:  normal  10- Mid Inferolateral:  normal  11- Mid Inferior:  normal  12- Mid Inferoseptal:  normal  13- Apical Anterior:  normal  14- Apical Lateral:  normal  15- Apical Inferior:  normal  16- Apical Septal:  normal  17- [place]:  normal      Valves    Aortic Valve: Annulus normal.  Stenosis not present. Regurgitation mild. Leaflets normal.  Leaflet motions normal.      Mitral Valve: Annulus normal.  Stenosis not present. Regurgitation mild. Leaflets normal.  Leaflet motions normal.      Tricuspid Valve: Annulus normal.  Stenosis not present. Regurgitation mild. Leaflets normal.  Leaflet motions normal.    Pulmonic Valve: Annulus normal.  Stenosis not present. Regurgitation mild.           Aorta Ascending Aorta:  Size normal.  Dissection not present. Aortic Arch:  Size normal.  Dissection not present. Descending Aorta:  Size normal.  Dissection not present. Atria    Right Atrium:  Size normal.  Spontaneous echo contrast not present. Thrombus not present. Tumor not present. Device not present. Left Atrium:  Size dilated. Spontaneous echo contrast not present. Thrombus not present. Tumor not present. Device not present. Left atrial appendage normal.      Septa    Atrial Septum:  Intra-atrial septal morphology normal.      Ventricular Septum:  Intra-ventricular septum morphology normal.      Diastolic Function Measurements:  Diastolic Dysfunction Grade=  E= ms  A= ms  E/A Ratio= 0.7  DT= ms  S/D=   IVRT=    Other Findings  Pericardium:  normal  Pleural Effusion:  none  Pulmonary Arteries:  normal  Pulmonary Venous Flow:  normal    Anesthesia Information  Performed Personally  Anesthesiologist:  Susanne Ferrer MD      Echocardiogram Comments:       No difficulty with probe insertion or manipulation. Pre:  LVH,  mild central MR, mild central AI EF 52%    Raymond V descending aorta and distal arch. Dr. Alysha Aguilar aware. Post:  No change in wall motion. Aorta intact. No change in valves.   Reviewed with Dr. Alysha Aguilar

## 2021-01-22 NOTE — BRIEF OP NOTE
Brief Postoperative Note      Patient: Jenaro Benitez  YOB: 1940  MRN: [id number]    Date of Procedure: 1/22/2021    Pre-Op Diagnosis: MVCAD    Post-Op Diagnosis: Same       Procedure(s):  CABG x3 (LIMA-LAD, SVG-Ramus, SVG-RCA)  EVH  KLS Plating   Modifier 22     Surgeon(s):  Edd Amanda DO    Assistant:  Physician Assistant: SAMUEL Cabrera; J Carlos Rollins, WILVER - AUGUSTINE; SAMUEL Marin    Findings:  Circumferential pericardial adhesions (Dressler's), atretic severely calcified distal coronaries, fragile/poor tissue quality including sternum. Anesthesia: General    Estimated Blood Loss (mL): less than 50     Complications: None    Specimens:   * No specimens in log *    Implants:  Implant Name Type Inv. Item Serial No.  Lot No. LRB No. Used Action   PLATE BONE 2.4VT THICKNESS STRNL LCK 6 H CP TI  PLATE BONE 8.7XF THICKNESS STRNL LCK 6 H CP TI  KLS DEREK LP-WD  N/A 1 Implanted   PLATE LCK STRNL 8-H LAD T 1.8MM  PLATE LCK STRNL 8-H LAD T 1.8MM  K[employer]EREK LP-WD  N/A 1 Implanted   SCREW BNE L9MM DIA2. 3MM THOR STRNL TI RY DRL FREE LEV 1  SCREW BNE L9MM DIA2. 3MM THOR STRNL TI RY DRL FREE LEV 1  KLS [employer]EREK LP-WD  N/A 8 Implanted   SCREW BNE L11MM DIA2. 3MM THOR STRNL TI RY DRL FREE LEV 1  SCREW BNE L11MM DIA2. 3MM THOR STRNL TI RY DRL FREE LEV 1  KLS DEREK LP-WD  N/A 6 Implanted         Drains:   Chest Tube 1 Posterior Mediastinal 19 Turkmen (Active)   Dressing Status Clean;Dry; Intact 01/22/21 0746   Dressing Type Dry dressing 01/22/21 0746   Site Assessment Clean;Dry; Intact 01/22/21 0746       Chest Tube 2 Anterior Mediastinal 19 Turkmen (Active)   Dressing Status Clean;Dry; Intact 01/22/21 0746   Dressing Type Dry dressing 01/22/21 0746   Site Assessment Clean;Dry; Intact 01/22/21 0746       Chest Tube 3 Left Pleural 19 Turkmen (Active)   Dressing Status Clean;Dry; Intact 01/22/21 0746   Dressing Type Dry dressing 01/22/21 0746   Site Assessment Clean;Dry; Intact 01/22/21 0758 Urethral Catheter Non-latex 16 fr (Active)     Electronically signed by Mariam Goldberg DO on 1/22/2021 at 12:35 PM

## 2021-01-22 NOTE — PROGRESS NOTES
[facility]    SUBJECTIVE:    He is being followed for surgical coronary disease. Seen this morning in bed. Had mild epigastric discomfort, likely his anginal equivalent, while ambulating on the telemetry floor yesterday. Also had angina after receiving nebulizer treatment. Surgery to be done this morning. Hypertension, hyperlipidemia, recent COVID 23 pneumonia December 2020 at which time he had epigastric discomfort and non-STEMI. Lateral wall ischemia on Lexiscan with subsequent heart catheterization showing surgical coronary disease. Echo showed LVEF 93%, stage I diastolic dysfunction, trace valvular regurgitation. OBJECTIVE:    His vital signs were reviewed today.     Vitals:    01/22/21 0433   BP: 126/67   Pulse: 71   Resp: 18   Temp: 97.5 °F (36.4 °C)   SpO2: 97%       Scheduled Meds:   sodium chloride flush  10 mL Intravenous 2 times per day    mupirocin   Nasal BID    chlorhexidine   Topical See Admin Instructions    magnesium citrate  296 mL Oral Once    Arformoterol Tartrate  15 mcg Nebulization BID    budesonide  0.5 mg Nebulization BID    ipratropium-albuterol  1 ampule Inhalation Q4H WA    betamethasone dipropionate   Topical BID    methylPREDNISolone  40 mg Intravenous Q8H    insulin glargine  10 Units Subcutaneous Nightly    isosorbide mononitrate  60 mg Oral Daily    insulin lispro  0-18 Units Subcutaneous TID WC    insulin lispro  0-9 Units Subcutaneous Nightly    amLODIPine  10 mg Oral Daily    docusate sodium  100 mg Oral Nightly    gabapentin  300 mg Oral BID    metoprolol succinate  50 mg Oral BID    sodium chloride flush  10 mL Intravenous 2 times per day    aspirin  81 mg Oral Daily    rosuvastatin  20 mg Oral Nightly    melatonin  10 mg Oral Nightly     Continuous Infusions:   dextrose       PRN Meds:.morphine, sodium chloride flush, magnesium hydroxide, bisacodyl, perflutren lipid microspheres, sodium chloride flush, acetaminophen, heparin (porcine), heparin (porcine), glucose, dextrose, glucagon (rDNA), dextrose    REVIEW OF SYSTEMS:     No pruritus, rash, bruising. Cardiac symptoms per HPI. No nausea, vomiting, abdominal pain, GI bleeding, change in bowel habits. No dysuria, urinary frequency, urgency, hematuria, flank pain. Joint pain but no muscle soreness, stiffness, aching. No headache, speech disturbance, lateralizing neurologic deficit. No hemoptysis, epistaxis, easy bruising. No anxiety, depression. No symptoms of hypothyroidism, hyperthyroidism, diabetes, heat or cold intolerance. FAMILY HISTORY: Negative for CAD in first-degree relatives. SOCIAL HISTORY: Negative for alcohol, tobacco, or illicit drug use. PHYSICAL EXAM:    General Appearance: alert, oriented, in no acute distress  Neck:  no bruits  Lungs:  Normal expansion. Clear to auscultation. No rales, rhonchi, or wheezing. Heart:  Heart sounds are normal.  Regular rate and rhythm without murmur, gallop or rub. Abdomen:  Soft, non-tender. Extremities: Extremities warm to touch, pink, with no edema. Neuro/musculosketal:  Unremarkable. LABS:    Recent Labs     01/21/21  1136      CREATININE 1.4*       Recent Labs     01/20/21  0522 01/21/21  0609 01/22/21  0533   HGB 11.9* 12.8 12.9       Recent Labs     01/21/21  1136   INR 1.1         IMPRESSION:    #1.  Recent non-STEMI with surgical disease-CABG this morning per CT surgery. Continue aspirin and stop heparin infusion when okay with surgical team.    #2. Hypertension-blood pressure controlled. No new antihypertensives added. #3. Hyperlipidemia-statin. #4.  Diabetes-per hospitalist.    #5.  Continue to follow.

## 2021-01-23 ENCOUNTER — APPOINTMENT (OUTPATIENT)
Dept: GENERAL RADIOLOGY | Age: 81
DRG: 233 | End: 2021-01-23
Attending: INTERNAL MEDICINE
Payer: MEDICARE

## 2021-01-23 LAB
ANION GAP SERPL CALCULATED.3IONS-SCNC: 13 MMOL/L (ref 7–16)
BUN BLDV-MCNC: 29 MG/DL (ref 8–23)
CALCIUM SERPL-MCNC: 8.5 MG/DL (ref 8.6–10.2)
CHLORIDE BLD-SCNC: 105 MMOL/L (ref 98–107)
CO2: 20 MMOL/L (ref 22–29)
CREAT SERPL-MCNC: 1.1 MG/DL (ref 0.7–1.2)
GFR AFRICAN AMERICAN: >60
GFR NON-AFRICAN AMERICAN: >60 ML/MIN/1.73
GLUCOSE BLD-MCNC: 255 MG/DL (ref 74–99)
HCT VFR BLD CALC: 26.6 % (ref 37–54)
HEMOGLOBIN: 8.7 G/DL (ref 12.5–16.5)
MAGNESIUM: 2.4 MG/DL (ref 1.6–2.6)
MCH RBC QN AUTO: 29.6 PG (ref 26–35)
MCHC RBC AUTO-ENTMCNC: 32.7 % (ref 32–34.5)
MCV RBC AUTO: 90.5 FL (ref 80–99.9)
METER GLUCOSE: 112 MG/DL (ref 74–99)
METER GLUCOSE: 152 MG/DL (ref 74–99)
METER GLUCOSE: 181 MG/DL (ref 74–99)
METER GLUCOSE: 209 MG/DL (ref 74–99)
METER GLUCOSE: 260 MG/DL (ref 74–99)
METER GLUCOSE: 309 MG/DL (ref 74–99)
METER GLUCOSE: 329 MG/DL (ref 74–99)
METER GLUCOSE: 361 MG/DL (ref 74–99)
PDW BLD-RTO: 15.2 FL (ref 11.5–15)
PLATELET # BLD: 167 E9/L (ref 130–450)
PMV BLD AUTO: 9.9 FL (ref 7–12)
POTASSIUM SERPL-SCNC: 5.3 MMOL/L (ref 3.5–5)
POTASSIUM SERPL-SCNC: 5.6 MMOL/L (ref 3.5–5)
RBC # BLD: 2.94 E12/L (ref 3.8–5.8)
SODIUM BLD-SCNC: 138 MMOL/L (ref 132–146)
WBC # BLD: 17.9 E9/L (ref 4.5–11.5)

## 2021-01-23 PROCEDURE — 6360000002 HC RX W HCPCS: Performed by: NURSE PRACTITIONER

## 2021-01-23 PROCEDURE — 6360000002 HC RX W HCPCS: Performed by: THORACIC SURGERY (CARDIOTHORACIC VASCULAR SURGERY)

## 2021-01-23 PROCEDURE — 36415 COLL VENOUS BLD VENIPUNCTURE: CPT

## 2021-01-23 PROCEDURE — 6370000000 HC RX 637 (ALT 250 FOR IP): Performed by: THORACIC SURGERY (CARDIOTHORACIC VASCULAR SURGERY)

## 2021-01-23 PROCEDURE — 2580000003 HC RX 258: Performed by: INTERNAL MEDICINE

## 2021-01-23 PROCEDURE — 94640 AIRWAY INHALATION TREATMENT: CPT

## 2021-01-23 PROCEDURE — 6370000000 HC RX 637 (ALT 250 FOR IP): Performed by: INTERNAL MEDICINE

## 2021-01-23 PROCEDURE — 82962 GLUCOSE BLOOD TEST: CPT

## 2021-01-23 PROCEDURE — 6370000000 HC RX 637 (ALT 250 FOR IP): Performed by: NURSE PRACTITIONER

## 2021-01-23 PROCEDURE — 84132 ASSAY OF SERUM POTASSIUM: CPT

## 2021-01-23 PROCEDURE — 83735 ASSAY OF MAGNESIUM: CPT

## 2021-01-23 PROCEDURE — 80048 BASIC METABOLIC PNL TOTAL CA: CPT

## 2021-01-23 PROCEDURE — 2580000003 HC RX 258: Performed by: THORACIC SURGERY (CARDIOTHORACIC VASCULAR SURGERY)

## 2021-01-23 PROCEDURE — 93798 PHYS/QHP OP CAR RHAB W/ECG: CPT

## 2021-01-23 PROCEDURE — 2140000000 HC CCU INTERMEDIATE R&B

## 2021-01-23 PROCEDURE — 71045 X-RAY EXAM CHEST 1 VIEW: CPT

## 2021-01-23 PROCEDURE — 85027 COMPLETE CBC AUTOMATED: CPT

## 2021-01-23 PROCEDURE — 94669 MECHANICAL CHEST WALL OSCILL: CPT

## 2021-01-23 PROCEDURE — 99233 SBSQ HOSP IP/OBS HIGH 50: CPT | Performed by: INTERNAL MEDICINE

## 2021-01-23 PROCEDURE — 2500000003 HC RX 250 WO HCPCS: Performed by: THORACIC SURGERY (CARDIOTHORACIC VASCULAR SURGERY)

## 2021-01-23 PROCEDURE — 2700000000 HC OXYGEN THERAPY PER DAY

## 2021-01-23 PROCEDURE — C9248 INJ, CLEVIDIPINE BUTYRATE: HCPCS | Performed by: THORACIC SURGERY (CARDIOTHORACIC VASCULAR SURGERY)

## 2021-01-23 RX ORDER — SODIUM POLYSTYRENE SULFONATE 15 G/60ML
30 SUSPENSION ORAL; RECTAL ONCE
Status: COMPLETED | OUTPATIENT
Start: 2021-01-23 | End: 2021-01-23

## 2021-01-23 RX ORDER — METOPROLOL SUCCINATE 25 MG/1
25 TABLET, EXTENDED RELEASE ORAL DAILY
Status: DISCONTINUED | OUTPATIENT
Start: 2021-01-24 | End: 2021-01-26

## 2021-01-23 RX ORDER — FUROSEMIDE 10 MG/ML
20 INJECTION INTRAMUSCULAR; INTRAVENOUS ONCE
Status: COMPLETED | OUTPATIENT
Start: 2021-01-23 | End: 2021-01-23

## 2021-01-23 RX ORDER — FERROUS SULFATE 325(65) MG
325 TABLET ORAL 2 TIMES DAILY WITH MEALS
Status: DISCONTINUED | OUTPATIENT
Start: 2021-01-23 | End: 2021-02-01 | Stop reason: HOSPADM

## 2021-01-23 RX ORDER — ASCORBIC ACID 500 MG
500 TABLET ORAL 2 TIMES DAILY
Status: DISCONTINUED | OUTPATIENT
Start: 2021-01-23 | End: 2021-02-01 | Stop reason: HOSPADM

## 2021-01-23 RX ORDER — HYDROCODONE BITARTRATE AND ACETAMINOPHEN 5; 325 MG/1; MG/1
2 TABLET ORAL EVERY 4 HOURS PRN
Status: DISCONTINUED | OUTPATIENT
Start: 2021-01-23 | End: 2021-02-01 | Stop reason: HOSPADM

## 2021-01-23 RX ORDER — METHYLPREDNISOLONE SODIUM SUCCINATE 125 MG/2ML
40 INJECTION, POWDER, LYOPHILIZED, FOR SOLUTION INTRAMUSCULAR; INTRAVENOUS 2 TIMES DAILY
Status: DISCONTINUED | OUTPATIENT
Start: 2021-01-23 | End: 2021-01-23

## 2021-01-23 RX ORDER — METHYLPREDNISOLONE SODIUM SUCCINATE 40 MG/ML
20 INJECTION, POWDER, LYOPHILIZED, FOR SOLUTION INTRAMUSCULAR; INTRAVENOUS DAILY
Status: DISCONTINUED | OUTPATIENT
Start: 2021-01-25 | End: 2021-01-23

## 2021-01-23 RX ORDER — ASPIRIN 81 MG/1
81 TABLET ORAL DAILY
Status: DISCONTINUED | OUTPATIENT
Start: 2021-01-24 | End: 2021-02-01 | Stop reason: HOSPADM

## 2021-01-23 RX ORDER — HYDROCODONE BITARTRATE AND ACETAMINOPHEN 5; 325 MG/1; MG/1
1 TABLET ORAL EVERY 4 HOURS PRN
Status: DISCONTINUED | OUTPATIENT
Start: 2021-01-23 | End: 2021-02-01 | Stop reason: HOSPADM

## 2021-01-23 RX ORDER — SENNA AND DOCUSATE SODIUM 50; 8.6 MG/1; MG/1
1 TABLET, FILM COATED ORAL 2 TIMES DAILY
Status: DISCONTINUED | OUTPATIENT
Start: 2021-01-23 | End: 2021-02-01 | Stop reason: HOSPADM

## 2021-01-23 RX ORDER — DOCOSANOL 100 MG/G
CREAM TOPICAL
Status: DISCONTINUED | OUTPATIENT
Start: 2021-01-23 | End: 2021-02-01 | Stop reason: HOSPADM

## 2021-01-23 RX ORDER — METHYLPREDNISOLONE SODIUM SUCCINATE 125 MG/2ML
40 INJECTION, POWDER, LYOPHILIZED, FOR SOLUTION INTRAMUSCULAR; INTRAVENOUS DAILY
Status: DISCONTINUED | OUTPATIENT
Start: 2021-01-24 | End: 2021-01-23

## 2021-01-23 RX ORDER — IPRATROPIUM BROMIDE AND ALBUTEROL SULFATE 2.5; .5 MG/3ML; MG/3ML
1 SOLUTION RESPIRATORY (INHALATION) 4 TIMES DAILY
Status: DISCONTINUED | OUTPATIENT
Start: 2021-01-23 | End: 2021-02-01 | Stop reason: HOSPADM

## 2021-01-23 RX ORDER — ACYCLOVIR 800 MG/1
400 TABLET ORAL
Status: DISCONTINUED | OUTPATIENT
Start: 2021-01-23 | End: 2021-01-23

## 2021-01-23 RX ORDER — ONDANSETRON 2 MG/ML
4 INJECTION INTRAMUSCULAR; INTRAVENOUS EVERY 8 HOURS PRN
Status: DISCONTINUED | OUTPATIENT
Start: 2021-01-23 | End: 2021-02-01 | Stop reason: HOSPADM

## 2021-01-23 RX ORDER — SODIUM CHLORIDE 0.9 % (FLUSH) 0.9 %
10 SYRINGE (ML) INJECTION 2 TIMES DAILY
Status: DISCONTINUED | OUTPATIENT
Start: 2021-01-23 | End: 2021-02-01 | Stop reason: HOSPADM

## 2021-01-23 RX ORDER — FOLIC ACID 1 MG/1
1 TABLET ORAL DAILY
Status: DISCONTINUED | OUTPATIENT
Start: 2021-01-23 | End: 2021-02-01 | Stop reason: HOSPADM

## 2021-01-23 RX ORDER — BISACODYL 10 MG
10 SUPPOSITORY, RECTAL RECTAL DAILY
Status: DISCONTINUED | OUTPATIENT
Start: 2021-01-23 | End: 2021-02-01 | Stop reason: HOSPADM

## 2021-01-23 RX ORDER — METOPROLOL SUCCINATE 25 MG/1
25 TABLET, EXTENDED RELEASE ORAL DAILY
Status: DISCONTINUED | OUTPATIENT
Start: 2021-01-23 | End: 2021-01-23

## 2021-01-23 RX ORDER — POTASSIUM CHLORIDE 20 MEQ/1
20 TABLET, EXTENDED RELEASE ORAL PRN
Status: DISCONTINUED | OUTPATIENT
Start: 2021-01-23 | End: 2021-02-01 | Stop reason: HOSPADM

## 2021-01-23 RX ORDER — DIPHENHYDRAMINE HCL 25 MG
25 TABLET ORAL NIGHTLY PRN
Status: DISCONTINUED | OUTPATIENT
Start: 2021-01-23 | End: 2021-02-01 | Stop reason: HOSPADM

## 2021-01-23 RX ORDER — ACETAMINOPHEN 325 MG/1
650 TABLET ORAL EVERY 4 HOURS PRN
Status: DISCONTINUED | OUTPATIENT
Start: 2021-01-23 | End: 2021-02-01 | Stop reason: HOSPADM

## 2021-01-23 RX ADMIN — PANTOPRAZOLE SODIUM 40 MG: 40 TABLET, DELAYED RELEASE ORAL at 08:01

## 2021-01-23 RX ADMIN — SODIUM CHLORIDE 6 UNITS/HR: 9 INJECTION, SOLUTION INTRAVENOUS at 05:00

## 2021-01-23 RX ADMIN — HYDROCODONE BITARTRATE AND ACETAMINOPHEN 2 TABLET: 5; 325 TABLET ORAL at 17:24

## 2021-01-23 RX ADMIN — BISACODYL 10 MG: 10 SUPPOSITORY RECTAL at 12:46

## 2021-01-23 RX ADMIN — Medication 10 ML: at 22:29

## 2021-01-23 RX ADMIN — MAGNESIUM GLUCONATE 500 MG ORAL TABLET 400 MG: 500 TABLET ORAL at 08:02

## 2021-01-23 RX ADMIN — CLOPIDOGREL 75 MG: 75 TABLET, FILM COATED ORAL at 08:01

## 2021-01-23 RX ADMIN — METOPROLOL SUCCINATE 25 MG: 25 TABLET, FILM COATED, EXTENDED RELEASE ORAL at 08:29

## 2021-01-23 RX ADMIN — ARFORMOTEROL TARTRATE 15 MCG: 15 SOLUTION RESPIRATORY (INHALATION) at 08:31

## 2021-01-23 RX ADMIN — Medication 10 ML: at 12:55

## 2021-01-23 RX ADMIN — GABAPENTIN 300 MG: 300 CAPSULE ORAL at 08:01

## 2021-01-23 RX ADMIN — GABAPENTIN 300 MG: 300 CAPSULE ORAL at 21:48

## 2021-01-23 RX ADMIN — HYDROCODONE BITARTRATE AND ACETAMINOPHEN 2 TABLET: 5; 325 TABLET ORAL at 21:48

## 2021-01-23 RX ADMIN — CLEVIPIDINE 2 MG/HR: 0.5 EMULSION INTRAVENOUS at 00:38

## 2021-01-23 RX ADMIN — OXYCODONE HYDROCHLORIDE 10 MG: 10 TABLET ORAL at 08:01

## 2021-01-23 RX ADMIN — BUDESONIDE 500 MCG: 0.5 SUSPENSION RESPIRATORY (INHALATION) at 20:18

## 2021-01-23 RX ADMIN — SODIUM POLYSTYRENE SULFONATE 30 G: 15 SUSPENSION ORAL; RECTAL at 22:54

## 2021-01-23 RX ADMIN — ROSUVASTATIN 20 MG: 20 TABLET, FILM COATED ORAL at 21:48

## 2021-01-23 RX ADMIN — METHYLPREDNISOLONE SODIUM SUCCINATE 40 MG: 125 INJECTION, POWDER, FOR SOLUTION INTRAMUSCULAR; INTRAVENOUS at 12:46

## 2021-01-23 RX ADMIN — TAMSULOSIN HYDROCHLORIDE 0.4 MG: 0.4 CAPSULE ORAL at 08:01

## 2021-01-23 RX ADMIN — BETAMETHASONE DIPROPIONATE: 0.5 CREAM TOPICAL at 08:01

## 2021-01-23 RX ADMIN — IPRATROPIUM BROMIDE AND ALBUTEROL SULFATE 1 AMPULE: .5; 3 SOLUTION RESPIRATORY (INHALATION) at 12:19

## 2021-01-23 RX ADMIN — MAGNESIUM HYDROXIDE 30 ML: 2400 SUSPENSION ORAL at 12:47

## 2021-01-23 RX ADMIN — ASPIRIN 81 MG: 81 TABLET, COATED ORAL at 08:02

## 2021-01-23 RX ADMIN — FENTANYL CITRATE 50 MCG: 50 INJECTION, SOLUTION INTRAMUSCULAR; INTRAVENOUS at 01:58

## 2021-01-23 RX ADMIN — ACYCLOVIR 400 MG: 800 TABLET ORAL at 14:27

## 2021-01-23 RX ADMIN — BISACODYL 5 MG: 5 TABLET, COATED ORAL at 12:46

## 2021-01-23 RX ADMIN — FERROUS SULFATE TAB 325 MG (65 MG ELEMENTAL FE) 325 MG: 325 (65 FE) TAB at 17:23

## 2021-01-23 RX ADMIN — FUROSEMIDE 20 MG: 10 INJECTION, SOLUTION INTRAMUSCULAR; INTRAVENOUS at 22:29

## 2021-01-23 RX ADMIN — OXYCODONE HYDROCHLORIDE AND ACETAMINOPHEN 500 MG: 500 TABLET ORAL at 21:48

## 2021-01-23 RX ADMIN — INSULIN LISPRO 3 UNITS: 100 INJECTION, SOLUTION INTRAVENOUS; SUBCUTANEOUS at 08:35

## 2021-01-23 RX ADMIN — DIPHENHYDRAMINE HYDROCHLORIDE 25 MG: 25 TABLET ORAL at 21:48

## 2021-01-23 RX ADMIN — DOCUSATE SODIUM 50 MG AND SENNOSIDES 8.6 MG 1 TABLET: 8.6; 5 TABLET, FILM COATED ORAL at 08:01

## 2021-01-23 RX ADMIN — OXYCODONE HYDROCHLORIDE 10 MG: 10 TABLET ORAL at 03:52

## 2021-01-23 RX ADMIN — BENZOCAINE AND MENTHOL 1 LOZENGE: 15; 3.6 LOZENGE ORAL at 14:27

## 2021-01-23 RX ADMIN — MUPIROCIN: 20 OINTMENT TOPICAL at 08:01

## 2021-01-23 RX ADMIN — Medication 2000 MG: at 03:01

## 2021-01-23 RX ADMIN — HYDROCODONE BITARTRATE AND ACETAMINOPHEN 2 TABLET: 5; 325 TABLET ORAL at 12:47

## 2021-01-23 RX ADMIN — IPRATROPIUM BROMIDE AND ALBUTEROL SULFATE 1 AMPULE: 2.5; .5 SOLUTION RESPIRATORY (INHALATION) at 20:18

## 2021-01-23 RX ADMIN — FOLIC ACID 1 MG: 1 TABLET ORAL at 12:46

## 2021-01-23 RX ADMIN — INSULIN LISPRO 3 UNITS: 100 INJECTION, SOLUTION INTRAVENOUS; SUBCUTANEOUS at 01:00

## 2021-01-23 RX ADMIN — METHYLPREDNISOLONE SODIUM SUCCINATE 40 MG: 40 INJECTION, POWDER, FOR SOLUTION INTRAMUSCULAR; INTRAVENOUS at 03:52

## 2021-01-23 RX ADMIN — FENTANYL CITRATE 50 MCG: 50 INJECTION, SOLUTION INTRAMUSCULAR; INTRAVENOUS at 09:03

## 2021-01-23 RX ADMIN — FENTANYL CITRATE 50 MCG: 50 INJECTION, SOLUTION INTRAMUSCULAR; INTRAVENOUS at 04:59

## 2021-01-23 RX ADMIN — Medication 2000 MG: at 19:03

## 2021-01-23 RX ADMIN — ONDANSETRON 4 MG: 2 INJECTION INTRAMUSCULAR; INTRAVENOUS at 09:20

## 2021-01-23 RX ADMIN — SODIUM BICARBONATE 50 MEQ: 84 INJECTION, SOLUTION INTRAVENOUS at 22:43

## 2021-01-23 RX ADMIN — INSULIN LISPRO 12 UNITS: 100 INJECTION, SOLUTION INTRAVENOUS; SUBCUTANEOUS at 12:14

## 2021-01-23 RX ADMIN — SODIUM CHLORIDE, PRESERVATIVE FREE 10 ML: 5 INJECTION INTRAVENOUS at 08:01

## 2021-01-23 RX ADMIN — INSULIN LISPRO 6 UNITS: 100 INJECTION, SOLUTION INTRAVENOUS; SUBCUTANEOUS at 22:33

## 2021-01-23 RX ADMIN — INSULIN LISPRO 15 UNITS: 100 INJECTION, SOLUTION INTRAVENOUS; SUBCUTANEOUS at 15:44

## 2021-01-23 RX ADMIN — BUDESONIDE 500 MCG: 0.5 SUSPENSION RESPIRATORY (INHALATION) at 08:32

## 2021-01-23 RX ADMIN — DOCUSATE SODIUM 50 MG AND SENNOSIDES 8.6 MG 1 TABLET: 8.6; 5 TABLET, FILM COATED ORAL at 21:48

## 2021-01-23 RX ADMIN — MUPIROCIN: 20 OINTMENT TOPICAL at 22:50

## 2021-01-23 RX ADMIN — CALCIUM GLUCONATE 1000 MG: 98 INJECTION, SOLUTION INTRAVENOUS at 22:55

## 2021-01-23 RX ADMIN — IPRATROPIUM BROMIDE AND ALBUTEROL SULFATE 1 AMPULE: .5; 3 SOLUTION RESPIRATORY (INHALATION) at 08:32

## 2021-01-23 RX ADMIN — Medication 2000 MG: at 10:35

## 2021-01-23 ASSESSMENT — PAIN SCALES - GENERAL
PAINLEVEL_OUTOF10: 0
PAINLEVEL_OUTOF10: 10
PAINLEVEL_OUTOF10: 9
PAINLEVEL_OUTOF10: 7

## 2021-01-23 ASSESSMENT — PAIN DESCRIPTION - LOCATION
LOCATION: CHEST

## 2021-01-23 ASSESSMENT — PAIN DESCRIPTION - DESCRIPTORS
DESCRIPTORS: THROBBING;TENDER;SORE
DESCRIPTORS: DISCOMFORT;CONSTANT
DESCRIPTORS: CONSTANT;ACHING
DESCRIPTORS: ACHING;CONSTANT;CRUSHING
DESCRIPTORS: CONSTANT

## 2021-01-23 ASSESSMENT — PAIN DESCRIPTION - ONSET
ONSET: ON-GOING
ONSET: ON-GOING

## 2021-01-23 ASSESSMENT — PAIN DESCRIPTION - PAIN TYPE: TYPE: SURGICAL PAIN

## 2021-01-23 ASSESSMENT — PAIN - FUNCTIONAL ASSESSMENT: PAIN_FUNCTIONAL_ASSESSMENT: PREVENTS OR INTERFERES SOME ACTIVE ACTIVITIES AND ADLS

## 2021-01-23 ASSESSMENT — PAIN DESCRIPTION - ORIENTATION
ORIENTATION: MID
ORIENTATION: MID

## 2021-01-23 ASSESSMENT — PAIN DESCRIPTION - FREQUENCY
FREQUENCY: CONTINUOUS
FREQUENCY: CONTINUOUS

## 2021-01-23 ASSESSMENT — PAIN DESCRIPTION - PROGRESSION
CLINICAL_PROGRESSION: NOT CHANGED
CLINICAL_PROGRESSION: NOT CHANGED

## 2021-01-23 NOTE — PROGRESS NOTES
[facility] CARDIOLOGY PROGRESS NOTE  [facility]        Subjective: Seeing patient who presented with non-ST elevation myocardial infarction, underwent CABG yesterday and extubated yesterday afternoon, chronic hypertension, diabetes, chronic pain. LVEF normal prior to surgery. Today appears to be uncomfortable just laying in bed moving around a little bit. Afraid to take a deep breath. Prior to admission was on Celebrex daily and Neurontin twice a day    He reports he ate a little bit today. Objective: Labs chart medications and telemetry are reviewed.     Patient Vitals for the past 24 hrs:   BP Temp Temp src Pulse Resp SpO2 Height Weight   01/23/21 1136 -- -- -- -- -- -- 5' 7\" (1.702 m) --   01/23/21 1115 125/70 97.4 °F (36.3 °C) Temporal 107 12 94 % -- --   01/23/21 1000 -- 98.1 °F (36.7 °C) Bladder 114 12 95 % -- --   01/23/21 0900 -- -- -- 96 26 94 % -- --   01/23/21 0829 (!) 140/57 -- -- 93 -- -- -- --   01/23/21 0800 -- 97.9 °F (36.6 °C) Bladder 91 15 94 % -- --   01/23/21 0700 -- -- -- 100 16 97 % -- --   01/23/21 0600 -- -- -- 92 14 93 % -- 249 lb 1.9 oz (113 kg)   01/23/21 0500 -- -- -- 96 19 93 % -- --   01/23/21 0400 -- 97.9 °F (36.6 °C) Bladder 97 17 93 % -- --   01/23/21 0300 -- -- -- 94 16 92 % -- --   01/23/21 0200 -- -- -- 95 12 92 % -- --   01/23/21 0100 -- -- -- 98 14 91 % -- --   01/23/21 0000 -- 98.1 °F (36.7 °C) Bladder 95 11 93 % -- --   01/22/21 2300 -- -- -- 99 10 92 % -- --   01/22/21 2200 -- -- -- 100 9 92 % -- --   01/22/21 2100 -- -- -- 92 19 93 % -- --   01/22/21 2000 -- 98.4 °F (36.9 °C) Bladder 91 15 96 % -- --   01/22/21 1900 -- 98.6 °F (37 °C) Bladder 86 16 97 % -- --   01/22/21 1800 -- 98.8 °F (37.1 °C) Bladder 86 25 95 % -- --   01/22/21 1700 -- 98.6 °F (37 °C) Bladder 85 14 95 % -- --   01/22/21 1600 -- 97.2 °F (36.2 °C) Bladder 81 16 97 % -- --         Intake/Output Summary (Last 24 hours) at 1/23/2021 1510  Last data filed at 1/23/2021 1136  Gross per 24 hour Intake 1130.31 ml   Output 1485 ml   Net -354.69 ml       Wt Readings from Last 3 Encounters:   01/23/21 249 lb 1.9 oz (113 kg)       Telemetry: I personally reviewed and shows normal sinus rhythm heart rate 100-110.     Current meds: Scheduled Meds:   [START ON 1/24/2021] aspirin  81 mg Oral Daily    bisacodyl  5 mg Oral Daily    sennosides-docusate sodium  1 tablet Oral BID    magnesium hydroxide  30 mL Oral Daily    ferrous sulfate  325 mg Oral BID WC    vitamin C  500 mg Oral BID    folic acid  1 mg Oral Daily    bisacodyl  10 mg Rectal Daily    [START ON 1/24/2021] metoprolol succinate  25 mg Oral Daily    insulin lispro  0-18 Units Subcutaneous TID WC    insulin lispro  0-9 Units Subcutaneous Nightly    sodium chloride flush  10 mL Intravenous BID    ipratropium-albuterol  1 ampule Inhalation 4x daily    rosuvastatin  20 mg Oral Nightly    magnesium oxide  400 mg Oral Daily    mupirocin   Nasal BID    pantoprazole  40 mg Oral Daily    ceFAZolin (ANCEF) IVPB  2,000 mg Intravenous Q8H    clopidogrel  75 mg Oral Daily    tamsulosin  0.4 mg Oral Daily    budesonide  0.5 mg Nebulization BID    betamethasone dipropionate   Topical BID    gabapentin  300 mg Oral BID     Continuous Infusions:  PRN Meds:.acetaminophen, HYDROcodone 5 mg - acetaminophen **OR** HYDROcodone 5 mg - acetaminophen, potassium chloride, ondansetron, diphenhydrAMINE, benzocaine-menthol    Allergies: Shrimp (diagnostic) and Valium [diazepam]      Labs:   Recent Labs     01/22/21  0533 01/22/21  1250 01/23/21  0510   WBC 13.2* 23.3* 17.9*   HGB 12.9 9.3* 8.7*   HCT 37.5 27.2* 26.6*   MCV 86.4 87.2 90.5    136 167       Labs:   Recent Labs     01/21/21  1136 01/21/21  1136 01/22/21  1250 01/22/21  1250 01/22/21  1615 01/22/21  1942/21  0510     --  140  --   --   --  138   K 4.7   < > 4.4   < > 4.61 5.0 5.3*   CL 98  --  107  --   --   --  105   CO2 23  --  22  --   --   --  20*   BUN 20  --  23  --   -- --  29*   CREATININE 1.4*  --  1.0  --   --   --  1.1    < > = values in this interval not displayed. Labs: No results for input(s): CKTOTAL, CKMB, CKMBINDEX, TROPONINI in the last 72 hours. Labs: No results for input(s): BNP in the last 72 hours. Labs: No results for input(s): CHOL, HDL, TRIG in the last 72 hours. Invalid input(s): Chloé Singleton    Labs:   Recent Labs     01/21/21  1136 01/22/21  1250   PROT 6.7  --    INR 1.1 1.3       Review of systems: No reported significant weight gain or weight loss. no dysuria or frequency, no dizziness, falls or trauma, no change in bowel or bladder habits, no hematochezia, hemoptysis or hematuria. No fevers, chills, nausea or vomiting reported. No significant wheezing or sputum production. No headache or visual changes. The remainder of the 10 review of systems otherwise negative. Exam      General: Patient comfortable in no distress and currently denies any chest pain. HEENT: Face symmetrical and no apparent cranial nerve deficit. Neck: No jugular venous distention, carotid bruit or thyromegaly. Lungs: Clear bilaterally without rales, wheezes or dullness. Cardiac: Regular rate and rhythm, no S3, S4, no rub or gallop. Abdomen: No rebound or guarding, no hepatosplenomegaly. Extremities: Without significant clubbing , cyanosis, or edema. Neuro:  No focal motor or sensory deficit apparent. Skin: No petechiae, no significant bruising. Assessment: See plan below        Plan: #1 recent non-ST elevation myocardial infarction and extensive CAD and underwent CABG yesterday. Medical management chronic CAD    #2 hypertension blood pressure follow-up. #3 diabetes and follow-up blood sugar blood sugars have been somewhat labile. #4 chronic pain and I suspect it will be a bit of a challenge to try to get him comfortable. Encourage use of incentive spirometer twice an hour when awake.   Up to chair with

## 2021-01-23 NOTE — OP NOTE
was held and the preoperative antibiotics were administered. A standard  midline incision was carried out. Subcutaneous tissues were dissected  and the sternum was divided with a sternal saw. Simultaneously, the  lower extremity endoscopic vein harvest was carried out, and the left  internal mammary artery was dissected free from the undersurface of the  left chest wall with a pedicle technique. All branches were clipped and  divided. The patient was administered systemic heparin for an adequate  ACT for bypass. Next, the pericardium was opened. We encountered dense  circumferential pericardial adhesions which were all carefully lysed,  taking care not to do damage to any of the cardiac structures. Once the  right atrium and ascending aorta were found through the adhesions, a  central cannulation was commenced in the usual fashion with the  ascending aorta and right atrial cannulas. This was followed by  insertion of antegrade and retrograde cardioplegia lines. The ACT was  verified. The patient was placed on cardiopulmonary bypass, and the  remainder of the circumferential pericardial adhesions were then taken  down in a normal fashion. We identified all the distal targets that we  could through the adhesions. The mid/distal portion of the LAD was  noted to be small; however, adequate for bypass. The ramus intermedius  was adequate for bypass. All obtuse marginal branches were severely  atretic and not able to be bypassed and so was the diagonal branch. Next, cross-clamp was applied. The heart was arrested with a  combination of antegrade and retrograde cardioplegia. We achieved an  excellent arrest and administered maintenance doses of cardioplegia  every 15 minutes via the retrograde cardioplegic cannula. First, my  attention was turned to the ramus intermedius.   The standard arteriotomy  was fashioned, and end-to-side vein graft anastomosis was created here with a running 7-0 Prolene suture. This was tested and had excellent  flow and also was hemostatic. Next, our attention was turned to the right coronary artery system. All  the PDA and PL branches were not sizeable for grafting, so we chose a  spot in the very, very distal portion of the right coronary artery. This underwent arteriotomy and end-to-side vein graft anastomosis as  well using 7-0 Prolene suture. This was tested and also had excellent  flow, was hemostatic. Finally, our attention was turned to the  mid/distal portion of the LAD. This was quite atretic, small, and  severely calcified throughout. We were able to find the more distal  lesion which was noted on the preoperative angiogram and were able to  bridge it with a very long arteriotomy which was then matched also with  the left internal mammary artery preparation. This anastomosis was  carried out with a running 7-0 Prolene suture. This was tested by  removing the bulldog clamp from the left internal mammary, and we noted  excellent runoff to the distal vessel and also was hemostatic at that  time. Next, our attention was turned to the proximal anastomoses. Two  standard aortotomies were fashioned in the ascending aorta with a 4.8-mm  punch. The vein grafts were then measured to length and the proximal  anastomoses were both carried out with running 6-0 Prolene suture. Once  this was completed, a warm dose of blood was administered via the  retrograde cardioplegia cannula as a \"hotshot. \"  Once this was complete,  the bulldog clamp was removed from the left internal mammary artery,  cross-clamp removed from the aorta, and the heart began to reanimate  immediately. A temporary ventricular pacing wire was placed; however,  not used secondary to the patient being in normal sinus rhythm.   Once  all evidence of intracardiac air was gone, the aortic root vent was then removed as well as retrograde cardioplegia cannula. The patient was  then weaned without difficulty from cardiopulmonary bypass, and the  venous cannula was removed. This was followed by administration of  protamine. Once the volume was returned from the cardiopulmonary bypass  circuit, the arterial cannula was removed as well. The ACT was verified  to be back down to its baseline, and an inspection for hemostasis was  carried out. A total of three chest tubes was inserted, one in the left  pleural space and two in the mediastinal space. The post bypass echo  demonstrated similar to preop which was normal biventricular function  and no valvular abnormalities. All sponge, needle, and instrument  counts were correct. The sternum was approximated with stainless steel  wires. In addition, the sternum because of its poor quality underwent  rigid fixation with the KLS plating system with one plate in the  manubrium and one in the body of the sternum. The wound was then  irrigated and closed in multiple layers. The patient tolerated the  procedure well and was transferred to the ICU in stable condition. The  cardiopulmonary bypass time was 120 minutes. Cross-clamp time 92  minutes.         Krzysztof Adams DO    D: 01/22/2021 13:32:07       T: 01/22/2021 14:46:59     [email or web address]  Job#: [id number]     Doc#: [id number]    CC:

## 2021-01-23 NOTE — DISCHARGE INSTR - DIET
 Good nutrition is important when healing from an illness, injury, or surgery. Follow any nutrition recommendations given to you during your hospital stay.  If you were given an oral nutrition supplement while in the hospital, continue to take this supplement at home. You can take it with meals, in-between meals, and/or before bedtime. These supplements can be purchased at most local grocery stores, pharmacies, and chain super-stores.  If you have any questions about your diet or nutrition, call the hospital and ask for the dietitian. A heart-healthy and consistent carbohydrate diet is recommended to manage heart disease and diabetes. To follow a heart-healthy and consistent carbohydrate diet,  Eat a balanced diet with whole grains, fruits and vegetables, and lean protein sources. Choose heart-healthy unsaturated fats. Limit saturated fats, trans fats, and cholesterol intake. Eat more plant-based or vegetarian meals using beans and soy foods for protein. Eat whole, unprocessed foods to limit the amount of sodium (salt) you eat. Choose a consistent amount of carbohydrate at each meal and snack. Limit refined carbohydrates especially sugar, sweets and sugar-sweetened beverages. If you drink alcohol, do so in moderation: one serving per day (women) and two servings per day (men). o One serving is equivalent to 12 ounces beer, 5 ounces wine, or 1.5 ounces distilled spirits      Tips    Tips for Choosing Heart-Healthy Fats    Choose lean protein and low-fat dairy foods to reduce saturated fat intake. Saturated fat is usually found in animal-based protein and is associated with certain health risks. Saturated fat is the biggest contributor to raise low-density lipoprotein (LDL) cholesterol levels. Research shows that limiting saturated fat lowers unhealthy cholesterol levels. Eat no more than 7% of your total calories each day from saturated fat.  Ask your RDN to help you determine how much saturated fat is right for you. Roby Munozn are many foods that do not contain large amounts of saturated fats. Swapping these foods to replace foods high in saturated fats will help you limit the saturated fat you eat and improve your cholesterol levels. You can also try eating more plant-based or vegetarian meals. Instead of     Try:       Whole milk, cheese, yogurt, and ice cream     1% or skim milk, low-fat cheese, non-fat yogurt, and low-fat ice cream       Fatty, marbled beef and pork     Lean beef, pork, or venison       Poultry with skin     Poultry without skin       Butter, stick margarine     Reduced-fat, whipped, or liquid spreads       Coconut oil, palm oil     Liquid vegetable oils: corn, canola, olive, soybean and safflower oils         Avoid foods that contain trans fats. Trans fats increase levels of LDL-cholesterol. Hydrogenated fat in processed foods is the main source of trans fats in foods. Trans fats can be found in stick margarine, shortening, processed sweets, baked goods, some fried foods, and packaged foods made with hydrogenated oils. Avoid foods with partially hydrogenated oil on the ingredient list such as: cookies, pastries, baked goods, biscuits, crackers, microwave popcorn, and frozen dinners. Choose foods with heart healthy fats. Polyunsaturated and monounsaturated fat are unsaturated fats that may help lower your blood cholesterol level when used in place of saturated fat in your diet. Ask your RDN about taking a dietary supplement with plant sterols and stanols to help lower your cholesterol level. Malik Liz shows that substituting saturated fats with unsaturated fats is beneficial to cholesterol levels. Try these easy swaps:              Instead of     Try:       Butter, stick margarine, or solid shortening     Reduced-fat, whipped, or liquid spreads       Beef, pork, or poultry with skin          Fish and seafood       Chips, crackers, snack foods     Raw or unsalted nuts and seeds or nut butters    Hummus with vegetables    Avocado on toast       Coconut oil, palm oil     Liquid vegetable oils: corn, canola, olive, soybean and safflower oils            Limit the amount of cholesterol you eat to less than 200 milligrams per day. Cholesterol is a substance carried through the bloodstream via lipoproteins, which are known as transporters of fat. Some body functions need cholesterol to work properly, but too much cholesterol in the bloodstream can damage arteries and build up blood vessel linings (which can lead to heart attack and stroke). You should eat less than 200 milligrams cholesterol per day. People respond differently to eating cholesterol. There is no test available right now that can figure out which people will respond more to dietary cholesterol and which will respond less. For individuals with high intake of dietary cholesterol, different types of increase (none, small, moderate, large) in LDL-cholesterol levels are all possible. Food sources of cholesterol include egg yolks and organ meats such as liver, gizzards. Limit egg yolks to two to four per week and avoid organ meats like liver and gizzards to control cholesterol intake. Tips for Choosing Heart-Healthy Carbohydrates    Consume a consistent amount of carbohydrate  It is important to eat foods with carbohydrates in moderation because they impact your blood glucose level. Carbohydrates can be found in many foods such as:  ?Grains (breads, crackers, rice, pasta, and cereals)  ? Starchy Vegetables (potatoes, corn, and peas)  ? Beans and legumes  ? Milk, soy milk, and yogurt  ? Fruit and fruit juice  ? Sweets (cakes, cookies, ice cream, jam and jelly)    Your RDN will help you set a goal for how many carbohydrate servings to eat at your meals and snacks. For many adults, eating 3 to 5 servings of carbohydrate foods at each meal and 1 or 2 carbohydrate servings for each snack works well. Check your blood glucose level regularly. It can tell you if you need to adjust when you eat carbohydrates. Choose foods rich in viscous (soluble) fiber  Viscous, or soluble, is found in the walls of plant cells. Viscous fiber is found only in plant-based foods. Eating foods with fiber helps to lower your unhealthy cholesterol and keep your blood glucose in range  ? Rich sources of viscous fiber include vegetables (asparagus, [place] sprouts, sweet potatoes, turnips) fruit (apricots, mangoes, oranges), legumes, and whole grains (barley, oats, and oat bran). As you increase your fiber intake gradually, also increase the amount of water you drink. This will help prevent constipation. If you have difficulty achieving this goal, ask your RDN about fiber laxatives. Choose fiber supplements made with viscous fibers such as psyllium seed husks or methylcellulose to help lower unhealthy cholesterol. Limit refined carbohydrates   There are three types of carbohydrates: starches, sugar, and fiber. Some carbohydrates occur naturally in food, like the starches in rice or corn or the sugars in fruits and milk. Refined carbohydrates--foods with high amounts of simple sugars--can raise triglyceride levels. High triglyceride levels are associated with coronary heart disease. Some examples of refined carbohydrate foods are table sugar, sweets, and beverages sweetened with added sugar. Tips for Reducing Sodium (Salt)    Although sodium is important for your body to function, too much sodium can be harmful for people with high blood pressure. As sodium and fluid buildup in your tissues and bloodstream, your blood pressure increases. High blood pressure may cause damage to other organs and increase your risk for a stroke. Even if you take a pill for blood pressure or a water pill (diuretic) to remove fluid, it is still important to have less salt in your diet.  Ask your doctor and RDN what amount of sodium is right for you. Avoid processed foods. Eat more fresh foods. ?Fresh fruits and vegetables are naturally low in sodium, as well as frozen vegetables and fruits that have no added juices or sauces. ?Fresh meats are lower in sodium than processed meats, such as saucedo, sausage, and hotdogs. Read the nutrition label or ask your  to help you find a fresh meat that is low in sodium. Eat less salt--at the table and when cooking. ? A single teaspoon of table salt has 2,300 mg of sodium. ? Leave the salt out of recipes for pasta, casseroles, and soups. ? Ask your RDN how to cook your favorite recipes without sodium    Be a smart . ? Look for food packages that say salt-free or sodium-free.  These items contain less than 5 milligrams of sodium per serving. ?Very low-sodium products contain less than 35 milligrams of sodium per serving. ?Low-sodium products contain less than 140 milligrams of sodium per serving. ?Beware for Unsalted or No Added Salt products. These items may still be high in sodium. Check the nutrition label. Add flavors to your food without adding sodium. ?Try lemon juice, lime juice, fruit juice or vinegar. ?Dry or fresh herbs add flavor. Try basil, bay leaf, dill, rosemary, parsley, he, dry mustard, nutmeg, thyme, and paprika. ? Pepper, red pepper flakes, and cayenne pepper can add spice t your meals without adding sodium. Hot sauce contains sodium, but if you use just a drop or two, it will not add up to much. ?Buy a sodium-free seasoning blend or make your own at home. Additional Lifestyle Tips    Achieve and maintain a healthy weight. Talk with your RDN or your doctor about what is a healthy weight for you. Set goals to reach and maintain that weight. To lose weight, reduce your calorie intake along with increasing your physical activity.  A weight loss of 10 to 15 pounds could reduce LDL-cholesterol by 5 milligrams per deciliter. Participate in physical activity. Talk with your health care team to find out what types of physical activity are best for you. Set a plan to get about 30 minutes of exercise on most days.     Foods Recommended      Food Group     Foods Recommended       Grains     Whole grain breads and cereals, including whole wheat, barley, rye, buckwheat, corn, teff, quinoa, millet, amaranth, brown or wild rice, sorghum, and oats  Pasta, especially whole wheat or other whole grain types   [employer], quinoa or wild rice  Whole grain crackers, bread, rolls, pitas   Home-made bread with reduced-sodium baking soda       Protein Foods     Lean cuts of beef and pork (loin, leg, round, extra lean hamburger)   Skinless poultry  Fish  Venison and other wild game  Dried beans and peas  Nuts and nut butters  Meat alternatives made with soy or textured vegetable protein   Egg whites or egg substitute  Cold cuts made with lean meat or soy protein       Dairy     Nonfat (skim), low-fat, or 1%-fat milk   Nonfat or low-fat yogurt or cottage cheese  Fat-free and low-fat cheese       Vegetables     Fresh, frozen, or canned vegetables without added fat or salt        Fruits     Fresh, frozen, canned, or dried fruit        Oils     Unsaturated oils (corn, olive, peanut, soy, sunflower, canola)   Soft or liquid margarines and vegetable oil spreads   Salad dressings  Seeds and nuts   Avocado       Foods Not Recommended      Food Group     Foods Not Recommended       Grains     Breads or crackers topped with salt   Cereals (hot or cold) with more than 300 mg sodium per serving   Biscuits, cornbread, and other quick breads prepared with baking soda   Bread crumbs or stuffing mix from a store   High-fat bakery products, such as doughnuts, biscuits, croissants, [place] pastries, pies, cookies   Instant cooking foods to which you add hot water and stir--potatoes, noodles, rice, etc.   Packaged starchy foods--seasoned noodle or rice dishes, stuffing mix, macaroni and cheese dinner   Snacks made with partially hydrogenated oils, including chips, cheese puffs, snack mixes, regular crackers, butter-flavored popcorn       Protein Foods     Higher-fat cuts of meats (ribs, t-bone steak, regular hamburger)   Spencer, sausage, or hot dogs   Cold cuts, such as salami or bologna, deli meats, cured meats, corned beef   Organ meats (liver, brains, gizzards, sweetbreads)   Poultry with skin   Fried or smoked meat, poultry, and fish   Whole eggs and egg yolks (more than 2-4 per week)   Salted legumes, nuts, seeds, or nut/seed butters   Meat alternatives with high levels of sodium (>300 mg per serving) or saturated fat (>5 g per serving)       Dairy     Whole milk,?2% fat milk, buttermilk   Whole milk yogurt or ice cream   Cream   Half-&-half   Cream cheese   Sour cream   Cheese       Vegetables     Canned or frozen vegetables with salt, fresh vegetables prepared with salt, butter, cheese, or cream sauce  Fried vegetables  Pickled vegetables such as olives, pickles, or sauerkraut       Fruits     Fried fruits   Fruits served with butter or cream       Oils     Butter, stick margarine, shortening   Partially hydrogenated oils or trans fats   Tropical oils (coconut, palm, palm kernel oils)       Other     Candy, sugar sweetened soft drinks and desserts   Salt, sea salt, garlic salt, and seasoning mixes containing salt   Bouillon cubes   Ketchup, barbecue sauce, Worcestershire sauce, soy sauce, teriyaki sauce   Miso   Salsa   Pickles, olives, relish        Heart Healthy Consistent Carbohydrate Sample 1-Day Menu View Nutrient Info             Breakfast     1 cup cooked oatmeal (2 carbohydrate servings)    3/4 cup blueberries (1 carbohydrate serving)    1 ounce almonds    1 cup skim milk (1 carbohydrate serving)    1 cup coffee       Morning Snack     1 cup sugar-free nonfat yogurt (1 carbohydrate serving)       Lunch     2 slices whole-wheat bread (2 carbohydrate servings)    2 ounces lean turkey breast    1 ounce low-fat Swiss cheese    1 teaspoon mustard    1 slice tomato    1 lettuce leaf    1 small pear (1 carbohydrate serving)    1 cup skim milk (1 carbohydrate serving)       Afternoon Snack     1 ounce trail mix with unsalted nuts, seeds, and raisins (1 carbohydrate serving)       Evening Meal     3 ounces salmon    2/3 cup cooked brown rice (2 carbohydrate servings)    1 teaspoon soft margarine    1 cup cooked broccoli with 1/2 cup cooked carrots (1 carbohydrate serving    Carrots, cooked, boiled, drained, without salt    1 cup lettuce    1 teaspoon olive oil with vinegar for dressing    1 small whole grain roll (1 carbohydrate serving)    1 teaspoon soft margarine    1 cup unsweetened tea       Evening Snack     1 extra-small banana (1 carbohydrate serving)       Daily Sum            Nutrient Unit Value     Macronutrients   Energy kcal 1938   Energy kJ 8105   Protein g 110   Total lipid (fat) g 70   Carbohydrate, by difference g 232   Fiber, total dietary g 34   Sugars, total g 84   Minerals   Calcium, Ca mg 1359   Iron, Fe mg 12   Sodium, Na mg 1905   Vitamins   Vitamin C, total ascorbic acid mg 123   Vitamin A, IU IU 23742   Vitamin D    Lipids   Fatty acids, total saturated g 13   Fatty acids, total monounsaturated g 30   Fatty acids, total polyunsaturated g 21   Cholesterol mg 113         Heart Healthy Consistent Carbohydrate Vegan Sample 1-Day Menu View Nutrient Info             Breakfast     1 cup oatmeal, cooked (2 carbohydrate servings)     ¾ cup blueberries (1 carbohydrate serving)     11 almonds, without salt     1 cup soymilk fortified with calcium, vitamin B12, and vitamin D    1 cup coffee       Morning Snack     6 ounces soy yogurt (1½ carbohydrate servings)       Lunch     1 whole wheat bun(1½ carbohydrate servings)    1 black bean burger (1 carbohydrate serving)    2 slices tomatoes    2 leaves lettuce     1 teaspoon mustard    1 small pear (1½ carbohydrate servings)     1 cup green tea, unsweetened       Afternoon Snack     1/3 cup trail mix with nuts, seeds, and raisins, without salt (1½ carbohydrate servinga)       Evening Meal     ½ cup meatless chicken     2/3 cup brown rice, cooked (2 carbohydrate servings)    1 cup broccoli, cooked (2/3 carbohydrate serving)    ½ cup carrots, cooked (1/3 carbohydrate serving)    2 teaspoons olive oil    1 teaspoon balsamic vinegar    1 whole wheat dinner roll (1 carbohydrate serving)     1 teaspoon margarine, soft, tub    1 cup soymilk fortified with calcium, vitamin B12, and vitamin D       Evening Snack     1 extra small banana (1 carbohydrate serving)    1 tablespoon peanut butter        Daily Sum            Nutrient Unit Value     Macronutrients   Energy kcal 1989   Energy kJ 8315   Protein g 85   Total lipid (fat) g 78   Carbohydrate, by difference g 258   Fiber, total dietary g 42   Sugars, total g 68   Minerals   Calcium, Ca mg 1343   Iron, Fe mg 16   Sodium, Na mg 1805   Vitamins   Vitamin C, total ascorbic acid mg 167   Vitamin A, IU IU 93821   Vitamin D    Lipids   Fatty acids, total saturated g 12   Fatty acids, total monounsaturated g 31   Fatty acids, total polyunsaturated g 25   Cholesterol mg 0         Heart Healthy Consistent Carbohydrate Vegetarian (Lacto-Ovo) Sample 1-Day Menu View Nutrient Info             Breakfast     1 cup oatmeal, cooked (2 carbohydrate servings)     ¾ cup blueberries (1 carbohydrate serving)     11 almonds, without salt     1 cup 1% milk (1 carbohydrate serving)    1 cup coffee       Morning Snack     1 cup fat-free plain yogurt (1 carbohydrate serving)       Lunch     1 whole wheat bun (1½ carbohydrate servings)    1 black bean burger (1 carbohydrate servings)    1 slice cheddar cheese, low sodium     2 slices tomatoes    2 leaves lettuce     1 teaspoon mustard    1 small pear (1½ carbohydrate servings)     1 cup green tea, unsweetened       Afternoon Snack     1/3 cup trail mix with nuts, seeds, and raisins, without salt (1½ carbohydrate servinga)       Evening Meal     ½ cup meatless chicken     2/3 cup brown rice, cooked (2 carbohydrate servings)    1 cup broccoli, cooked (2/3 carbohydrate serving)    ½ cup carrots, cooked (1/3 carbohydrate serving)    2 teaspoons olive oil    1 teaspoon balsamic vinegar    1 whole wheat dinner roll (1 carbohydrate serving)     1 teaspoon margarine, soft, tub    1 cup 1% milk (1 carbohydrate serving)       Evening Snack     1 extra small banana (1 carbohydrate serving)    1 tablespoon peanut butter       Daily Sum            Nutrient Unit Value     Macronutrients   Energy kcal 2093   Energy kJ 8755   Protein g 97   Total lipid (fat) g 81   Carbohydrate, by difference g 269   Fiber, total dietary g 39   Sugars, total g 96   Minerals   Calcium, Ca mg 1571   Iron, Fe mg 15   Sodium, Na mg 1950   Vitamins   Vitamin C, total ascorbic acid mg 139   Vitamin A, IU IU 27143   Vitamin D    Lipids   Fatty acids, total saturated g 20   Fatty acids, total monounsaturated g 33   Fatty acids, total polyunsaturated g 20   Cholesterol mg 57

## 2021-01-23 NOTE — PLAN OF CARE
Problem: Falls - Risk of:  Goal: Will remain free from falls  Description: Will remain free from falls  1/22/2021 1958 by Yesi Davies RN  Outcome: Met This Shift     Problem: Falls - Risk of:  Goal: Absence of physical injury  Description: Absence of physical injury  Outcome: Met This Shift     Problem: Infection - Surgical Site:  Goal: Will show no infection signs and symptoms  Description: Will show no infection signs and symptoms  Outcome: Met This Shift     Problem: Tissue Perfusion - Cardiopulmonary, Altered:  Goal: Absence of angina  Description: Absence of angina  Outcome: Met This Shift     Problem: Skin Integrity:  Goal: Will show no infection signs and symptoms  Description: Will show no infection signs and symptoms  Outcome: Met This Shift

## 2021-01-23 NOTE — PROGRESS NOTES
[facility]  Department of Internal Medicine  Division of Pulmonary, Critical Care and Sleep Medicine  Progress Note  Ebony Ugalde DO, [facility], [address] [place] [address], [place], [address]    Patient: Jenaro Benitez  MRN: [id number]  : 1940    Encounter Time: 2:51 PM     Date of Admission: 1/15/2021 11:51 AM    Primary Care Physician: WILVER Phipps NP    Reason for Consultation: Pre Op assesssment     SUBJECTIVE:  On 2021 underwent 1. Coronary artery bypass grafting x3 using left internal mammary artery to the left anterior descending artery, reverse saphenous vein graft to the ramus intermedius, and reverse saphenous vein graft to the distal right coronary artery. 2.  Lower extremity endoscopic vein harvest. 3.  Rigid sternal fixation with a KLS plating system. 4.  22 modifier. Throat sore - Cepacol   Oral cold sore - Abreva crm or = med per pharm  May need diuresis  Weaning oxygen         OBJECTIVE:     PHYSICAL EXAM:   VITALS:   Vitals:    21 0900 21 1000 21 1115 21 1136   BP:   125/70    Pulse: 96 114 107    Resp: 26 12 12    Temp:  98.1 °F (36.7 °C) 97.4 °F (36.3 °C)    TempSrc:  Bladder Temporal    SpO2: 94% 95% 94%    Weight:       Height:    5' 7\" (1.702 m)        Intake/Output Summary (Last 24 hours) at 2021 1451  Last data filed at 2021 1136  Gross per 24 hour   Intake 1160.31 ml   Output 1620 ml   Net -459.69 ml        CONSTITUTIONAL:   A&O x 3, NAD  SKIN:     No rash, Skin discoloration  HEENT:     EOMI, MMM, No thrush  NECK:    No bruits, No JVP apprechiated  CV:      Sinus,  No gallops  PULMONARY:   Couse BS,  No Wheezing, No Rales, No Rhonchi      No noted egophony  ABDOMEN:     Soft, non-tender. BS normal. No R/R/G  EXT:    No deformities . No clubbing. Trace lower extremity edema,   PULSE:   Appears equal and palpable.   PSYCHIATRIC:  Seems appropriate, No acute psycosis  MS:    No fractures, No gross weakness  NEUROLOGIC:   Non-focal     DATA: IMAGING & TESTING:     LABORATORY TESTS:    CBC:   Lab Results   Component Value Date    WBC 17.9 01/23/2021    RBC 2.94 01/23/2021    HGB 8.7 01/23/2021    HCT 26.6 01/23/2021    MCV 90.5 01/23/2021    MCH 29.6 01/23/2021    MCHC 32.7 01/23/2021    RDW 15.2 01/23/2021     01/23/2021    MPV 9.9 01/23/2021     CMP:    Lab Results   Component Value Date     01/23/2021    K 5.3 01/23/2021    K 4.3 01/16/2021     01/23/2021    CO2 20 01/23/2021    BUN 29 01/23/2021    CREATININE 1.1 01/23/2021    GFRAA >60 01/23/2021    LABGLOM >60 01/23/2021    GLUCOSE 255 01/23/2021    PROT 6.7 01/21/2021    LABALBU 3.5 01/21/2021    CALCIUM 8.5 01/23/2021    BILITOT 0.3 01/21/2021    ALKPHOS 117 01/21/2021    AST 23 01/21/2021    ALT 31 01/21/2021     Calcium:    Lab Results   Component Value Date    CALCIUM 8.5 01/23/2021        PRO-BNP: No results found for: PROBNP   ABGs:   Lab Results   Component Value Date    PH 7.332 01/22/2021    PO2 65.7 01/22/2021    PCO2 40.9 01/22/2021     Hemoglobin A1C: No components found for: HGBA1C    IMAGING:  Imaging tests were completed and reviewed and discussed radiology and care team involved and reveals     Pulmonary function test: Pulmonary function test revealed forced vital capacity of 1.4 at 6 liters, 41% of predicted with an FEV1 of 1.5 at 7 liters, 44% ofpredicted with an FEV1/FVC ratio of 80%. Midflow rates are 60% of predicted with maximum voluntary ventilation at 20 liters per minute, 88% of predicted.     Static lung volumes with total vital capacity of 1.48 liters, 41% of predicted. Inspiratory capacity is 1.36 liters, 45% of predicted. Expiratory reserve volume 0.13 liters, 73% of predicted. The diffusion capacity is 17.17 mL/min per mmHg, which is 25% of predicted. Severely restricted lung physiology with noted moderate-to-severe loss in gas transfer.       Echo Complete  TTE procedure:Echo Complete W/Doppler & Color Flow.  Procedure Date Date: 01/16/2021 Start: 09:28 AM Study Location: Portable Technical Quality: Adequate visualization Indications:LV function. Patient Status: Routine Height: 67 inches Weight: 195 pounds BSA: 2 m^2 BMI: 30.54 kg/m^2  Findings   Left Ventricle  Left ventricle is normal in size. Normal left ventricular wall thickness. Low normal ejection fraction. Ejection fraction is visually estimated at 55%. There is doppler evidence of stage I diastolic dysfunction. Right Ventricle  Mildly dilated right ventricle. Right ventricle global systolic function is normal.   Left Atrium  Normal sized left atrium. Interatrial septum appears intact. Right Atrium  Normal right atrium size. Mitral Valve  Structurally normal mitral valve. No evidence of mitral valve stenosis. Mild mitral regurgitation is present. Tricuspid Valve  The tricuspid valve appears structurally normal.  Physiologic and/or trace tricuspid regurgitation. Pulmonary hypertension is not present. Aortic Valve  Individual aortic valve leaflets are not clearly visualized. No hemodynamically significant aortic stenosis is present. Mild aortic regurgitation is noted. Pulmonic Valve  The pulmonic valve was not well visualized. No evidence of pulmonic valve stenosis. No evidence of any pulmonic regurgitation. Pericardial Effusion  No evidence of pericardial effusion. Pleural Effusion  No evidence of pleural effusion. Aorta  Aortic root dimension within normal limits. Ct Chest Wo Contrast    Result Date: 1/16/2021  FINDINGS: The heart is normal in size. Atherosclerotic calcifications are associated with the coronary arteries. No pericardial effusion. Ascending thoracic aorta measures 3.2 cm in diameter. Descending thoracic aorta measures 2.4 cm in diameter. There is a combined origin of brachiocephalic trunk and left common carotid artery at thoracic aortic arch. No mediastinal fluid collections.   No mediastinal or hilar lymphadenopathy. There are no airspace opacities. There is a loculated right pleural effusion with adjacent subsegmental atelectasis. No pneumothorax. View of the upper abdomen shows normal bilateral adrenal glands. 1.  Loculated right pleural effusion with adjacent subsegmental atelectasis. 2.  No evidence of pneumonia. 3.  Atherosclerotic calcifications associated with the coronary arteries. Us Carotid Artery Bilateral  Result Date: 1/17/2021  Atherosclerotic disease. No hemodynamically significant stenosis is identified Estimated stenosis by NASCET criteria in the proximal right carotid artery is between 0% and 49%. Estimated stenosis by NASCET criteria in the proximal left carotid artery is between 0% and 49%. Assessment: Iveth Ruvalcaba is a 77-year-old gentleman seen for preoperative assessment with abnormal pulmonary assessment revealing severe restrictive physiologic defect with loss and diffusion capacity. The undergo bypass surgery in the next few days and we were asked to comment on preoperative risk. 1. Restrictive physiology  2. MV CAD  3. HLD  4. HTN  5. Loculated pleural effusion    Plan:   1. Adjust  bronchodilators  2. Try to obtain the sputum samples  3. Off steroids  4. Oxygen support keep >90%  5. CPAP at night - non compliant  6. ICS @ 2 hours to prevent atelectasis  7. Check BNP and may diuresis  8. Cepacol for sore throat  9.  PT assessment      Virginia Kang DO, MPH, FCCP, [facility]

## 2021-01-23 NOTE — PROGRESS NOTES
Start Abreva cream topicaly to cold sore five times daily. Family to obtain and bring in for patient.   Pharmacy consult from Dr Tita Nguyen [facility],1/23/2021 3:41 PM

## 2021-01-23 NOTE — PROGRESS NOTES
(UTO ; no weights available in EMR hx from previous encounters)     · Ideal Body Weight: 148 lbs; % Ideal Body Weight 127.7 %   · BMI: 29.6  · BMI Categories: Overweight (BMI 25.0-29. 9)       Nutrition Diagnosis:   · Increased nutrient needs related to increase demand for energy/nutrients as evidenced by wounds(surgical)      Nutrition Interventions:   Food and/or Nutrient Delivery:  Continue Current Diet, Continue Oral Nutrition Supplement, Modify Oral Nutrition Supplement  Nutrition Education/Counseling:  Education completed   Coordination of Nutrition Care:  Continue to monitor while inpatient    Goals:  Patient will consume ~75% of meals served       Nutrition Monitoring and Evaluation:   Behavioral-Environmental Outcomes:  Beliefs and Attitutes   Food/Nutrient Intake Outcomes:  Food and Nutrient Intake, Supplement Intake  Physical Signs/Symptoms Outcomes:  Biochemical Data, Chewing or Swallowing, GI Status, Fluid Status or Edema, Hemodynamic Status, Meal Time Behavior, Nutrition Focused Physical Findings, Skin, Weight     Discharge Planning:     Too soon to determine     Electronically signed by Radha Cook RD, LD on 1/23/21 at 11:46 AM EST    Contact: [phone]

## 2021-01-23 NOTE — PROGRESS NOTES
Nutrition Education      Provided educational handouts and sample meal plans on heart healthy/diabetic diet. Recommend outpatient nutrition counseling for further education. Pt can contact our outpatient dietitian Carole Baumgarten ([phone]). · Written educational materials provided. · Contact name and number provided. · Refer to Patient Education activity for more details.     Electronically signed by Yuli Mccray RD, MADYSON on 1/23/21 at 11:47 AM EST    Contact: [phone]

## 2021-01-24 ENCOUNTER — APPOINTMENT (OUTPATIENT)
Dept: GENERAL RADIOLOGY | Age: 81
DRG: 233 | End: 2021-01-24
Attending: INTERNAL MEDICINE
Payer: MEDICARE

## 2021-01-24 LAB
ANION GAP SERPL CALCULATED.3IONS-SCNC: 15 MMOL/L (ref 7–16)
BUN BLDV-MCNC: 41 MG/DL (ref 8–23)
CALCIUM SERPL-MCNC: 9 MG/DL (ref 8.6–10.2)
CHLORIDE BLD-SCNC: 96 MMOL/L (ref 98–107)
CO2: 25 MMOL/L (ref 22–29)
CREAT SERPL-MCNC: 1.2 MG/DL (ref 0.7–1.2)
GFR AFRICAN AMERICAN: >60
GFR NON-AFRICAN AMERICAN: 58 ML/MIN/1.73
GLUCOSE BLD-MCNC: 343 MG/DL (ref 74–99)
HCT VFR BLD CALC: 26 % (ref 37–54)
HEMOGLOBIN: 8.3 G/DL (ref 12.5–16.5)
MCH RBC QN AUTO: 29.2 PG (ref 26–35)
MCHC RBC AUTO-ENTMCNC: 31.9 % (ref 32–34.5)
MCV RBC AUTO: 91.5 FL (ref 80–99.9)
METER GLUCOSE: 253 MG/DL (ref 74–99)
METER GLUCOSE: 261 MG/DL (ref 74–99)
METER GLUCOSE: 279 MG/DL (ref 74–99)
METER GLUCOSE: 365 MG/DL (ref 74–99)
METER GLUCOSE: 430 MG/DL (ref 74–99)
PDW BLD-RTO: 15.2 FL (ref 11.5–15)
PLATELET # BLD: 154 E9/L (ref 130–450)
PMV BLD AUTO: 10 FL (ref 7–12)
POTASSIUM SERPL-SCNC: 4.5 MMOL/L (ref 3.5–5)
PRO-BNP: 4111 PG/ML (ref 0–450)
RBC # BLD: 2.84 E12/L (ref 3.8–5.8)
SODIUM BLD-SCNC: 136 MMOL/L (ref 132–146)
TSH SERPL DL<=0.05 MIU/L-ACNC: 2.49 UIU/ML (ref 0.27–4.2)
WBC # BLD: 15.3 E9/L (ref 4.5–11.5)

## 2021-01-24 PROCEDURE — 6360000002 HC RX W HCPCS: Performed by: NURSE PRACTITIONER

## 2021-01-24 PROCEDURE — 6370000000 HC RX 637 (ALT 250 FOR IP): Performed by: NURSE PRACTITIONER

## 2021-01-24 PROCEDURE — 2700000000 HC OXYGEN THERAPY PER DAY

## 2021-01-24 PROCEDURE — 71045 X-RAY EXAM CHEST 1 VIEW: CPT

## 2021-01-24 PROCEDURE — 2140000000 HC CCU INTERMEDIATE R&B

## 2021-01-24 PROCEDURE — 97530 THERAPEUTIC ACTIVITIES: CPT

## 2021-01-24 PROCEDURE — 93798 PHYS/QHP OP CAR RHAB W/ECG: CPT

## 2021-01-24 PROCEDURE — 80048 BASIC METABOLIC PNL TOTAL CA: CPT

## 2021-01-24 PROCEDURE — 82962 GLUCOSE BLOOD TEST: CPT

## 2021-01-24 PROCEDURE — 85027 COMPLETE CBC AUTOMATED: CPT

## 2021-01-24 PROCEDURE — 6370000000 HC RX 637 (ALT 250 FOR IP): Performed by: THORACIC SURGERY (CARDIOTHORACIC VASCULAR SURGERY)

## 2021-01-24 PROCEDURE — 36415 COLL VENOUS BLD VENIPUNCTURE: CPT

## 2021-01-24 PROCEDURE — 2580000003 HC RX 258: Performed by: INTERNAL MEDICINE

## 2021-01-24 PROCEDURE — 83880 ASSAY OF NATRIURETIC PEPTIDE: CPT

## 2021-01-24 PROCEDURE — 97162 PT EVAL MOD COMPLEX 30 MIN: CPT

## 2021-01-24 PROCEDURE — 94640 AIRWAY INHALATION TREATMENT: CPT

## 2021-01-24 PROCEDURE — 99233 SBSQ HOSP IP/OBS HIGH 50: CPT | Performed by: INTERNAL MEDICINE

## 2021-01-24 PROCEDURE — 94660 CPAP INITIATION&MGMT: CPT

## 2021-01-24 PROCEDURE — 84443 ASSAY THYROID STIM HORMONE: CPT

## 2021-01-24 PROCEDURE — 6370000000 HC RX 637 (ALT 250 FOR IP): Performed by: INTERNAL MEDICINE

## 2021-01-24 RX ORDER — POLYETHYLENE GLYCOL 3350 17 G/17G
17 POWDER, FOR SOLUTION ORAL DAILY
Status: DISCONTINUED | OUTPATIENT
Start: 2021-01-24 | End: 2021-02-01 | Stop reason: HOSPADM

## 2021-01-24 RX ORDER — AMIODARONE HYDROCHLORIDE 200 MG/1
400 TABLET ORAL 3 TIMES DAILY
Status: DISCONTINUED | OUTPATIENT
Start: 2021-01-24 | End: 2021-01-26

## 2021-01-24 RX ORDER — FUROSEMIDE 10 MG/ML
20 INJECTION INTRAMUSCULAR; INTRAVENOUS ONCE
Status: COMPLETED | OUTPATIENT
Start: 2021-01-24 | End: 2021-01-24

## 2021-01-24 RX ADMIN — FUROSEMIDE 20 MG: 10 INJECTION, SOLUTION INTRAMUSCULAR; INTRAVENOUS at 11:34

## 2021-01-24 RX ADMIN — AMIODARONE HYDROCHLORIDE 400 MG: 200 TABLET ORAL at 08:54

## 2021-01-24 RX ADMIN — Medication 10 ML: at 20:51

## 2021-01-24 RX ADMIN — DOCUSATE SODIUM 50 MG AND SENNOSIDES 8.6 MG 1 TABLET: 8.6; 5 TABLET, FILM COATED ORAL at 08:55

## 2021-01-24 RX ADMIN — INSULIN LISPRO 18 UNITS: 100 INJECTION, SOLUTION INTRAVENOUS; SUBCUTANEOUS at 11:28

## 2021-01-24 RX ADMIN — BUDESONIDE 500 MCG: 0.5 SUSPENSION RESPIRATORY (INHALATION) at 21:32

## 2021-01-24 RX ADMIN — MUPIROCIN: 20 OINTMENT TOPICAL at 08:53

## 2021-01-24 RX ADMIN — IPRATROPIUM BROMIDE AND ALBUTEROL SULFATE 1 AMPULE: 2.5; .5 SOLUTION RESPIRATORY (INHALATION) at 08:41

## 2021-01-24 RX ADMIN — METOPROLOL SUCCINATE 25 MG: 25 TABLET, EXTENDED RELEASE ORAL at 08:55

## 2021-01-24 RX ADMIN — IPRATROPIUM BROMIDE AND ALBUTEROL SULFATE 1 AMPULE: 2.5; .5 SOLUTION RESPIRATORY (INHALATION) at 17:50

## 2021-01-24 RX ADMIN — BISACODYL 5 MG: 5 TABLET, COATED ORAL at 08:54

## 2021-01-24 RX ADMIN — TAMSULOSIN HYDROCHLORIDE 0.4 MG: 0.4 CAPSULE ORAL at 08:54

## 2021-01-24 RX ADMIN — Medication 2000 MG: at 03:22

## 2021-01-24 RX ADMIN — IPRATROPIUM BROMIDE AND ALBUTEROL SULFATE 1 AMPULE: 2.5; .5 SOLUTION RESPIRATORY (INHALATION) at 12:02

## 2021-01-24 RX ADMIN — MAGNESIUM GLUCONATE 500 MG ORAL TABLET 400 MG: 500 TABLET ORAL at 08:55

## 2021-01-24 RX ADMIN — HYDROCODONE BITARTRATE AND ACETAMINOPHEN 2 TABLET: 5; 325 TABLET ORAL at 03:25

## 2021-01-24 RX ADMIN — ENOXAPARIN SODIUM 40 MG: 40 INJECTION SUBCUTANEOUS at 08:54

## 2021-01-24 RX ADMIN — PANTOPRAZOLE SODIUM 40 MG: 40 TABLET, DELAYED RELEASE ORAL at 08:54

## 2021-01-24 RX ADMIN — FERROUS SULFATE TAB 325 MG (65 MG ELEMENTAL FE) 325 MG: 325 (65 FE) TAB at 08:54

## 2021-01-24 RX ADMIN — IPRATROPIUM BROMIDE AND ALBUTEROL SULFATE 1 AMPULE: 2.5; .5 SOLUTION RESPIRATORY (INHALATION) at 21:31

## 2021-01-24 RX ADMIN — ROSUVASTATIN 20 MG: 20 TABLET, FILM COATED ORAL at 20:46

## 2021-01-24 RX ADMIN — ASPIRIN 81 MG: 81 TABLET, COATED ORAL at 08:55

## 2021-01-24 RX ADMIN — FOLIC ACID 1 MG: 1 TABLET ORAL at 08:54

## 2021-01-24 RX ADMIN — Medication 10 ML: at 08:55

## 2021-01-24 RX ADMIN — DOCUSATE SODIUM 50 MG AND SENNOSIDES 8.6 MG 1 TABLET: 8.6; 5 TABLET, FILM COATED ORAL at 20:46

## 2021-01-24 RX ADMIN — HYDROCODONE BITARTRATE AND ACETAMINOPHEN 2 TABLET: 5; 325 TABLET ORAL at 20:51

## 2021-01-24 RX ADMIN — OXYCODONE HYDROCHLORIDE AND ACETAMINOPHEN 500 MG: 500 TABLET ORAL at 20:46

## 2021-01-24 RX ADMIN — MUPIROCIN: 20 OINTMENT TOPICAL at 20:46

## 2021-01-24 RX ADMIN — GABAPENTIN 300 MG: 300 CAPSULE ORAL at 08:54

## 2021-01-24 RX ADMIN — CLOPIDOGREL 75 MG: 75 TABLET, FILM COATED ORAL at 08:55

## 2021-01-24 RX ADMIN — POLYETHYLENE GLYCOL 3350 17 G: 17 POWDER, FOR SOLUTION ORAL at 09:02

## 2021-01-24 RX ADMIN — AMIODARONE HYDROCHLORIDE 400 MG: 200 TABLET ORAL at 20:46

## 2021-01-24 RX ADMIN — OXYCODONE HYDROCHLORIDE AND ACETAMINOPHEN 500 MG: 500 TABLET ORAL at 08:54

## 2021-01-24 RX ADMIN — HYDROCODONE BITARTRATE AND ACETAMINOPHEN 2 TABLET: 5; 325 TABLET ORAL at 08:06

## 2021-01-24 RX ADMIN — BUDESONIDE 500 MCG: 0.5 SUSPENSION RESPIRATORY (INHALATION) at 08:41

## 2021-01-24 RX ADMIN — AMIODARONE HYDROCHLORIDE 400 MG: 200 TABLET ORAL at 13:47

## 2021-01-24 RX ADMIN — MAGNESIUM HYDROXIDE 30 ML: 2400 SUSPENSION ORAL at 08:54

## 2021-01-24 RX ADMIN — INSULIN LISPRO 9 UNITS: 100 INJECTION, SOLUTION INTRAVENOUS; SUBCUTANEOUS at 16:24

## 2021-01-24 RX ADMIN — INSULIN LISPRO 9 UNITS: 100 INJECTION, SOLUTION INTRAVENOUS; SUBCUTANEOUS at 07:19

## 2021-01-24 RX ADMIN — INSULIN LISPRO 5 UNITS: 100 INJECTION, SOLUTION INTRAVENOUS; SUBCUTANEOUS at 20:46

## 2021-01-24 RX ADMIN — GABAPENTIN 300 MG: 300 CAPSULE ORAL at 20:46

## 2021-01-24 ASSESSMENT — PAIN SCALES - GENERAL
PAINLEVEL_OUTOF10: 2
PAINLEVEL_OUTOF10: 7
PAINLEVEL_OUTOF10: 7

## 2021-01-24 ASSESSMENT — PAIN DESCRIPTION - LOCATION
LOCATION: STERNUM;INCISION
LOCATION: BACK;STERNUM;INCISION

## 2021-01-24 ASSESSMENT — PAIN DESCRIPTION - PAIN TYPE
TYPE: SURGICAL PAIN;ACUTE PAIN
TYPE: SURGICAL PAIN

## 2021-01-24 ASSESSMENT — PAIN DESCRIPTION - FREQUENCY: FREQUENCY: CONTINUOUS

## 2021-01-24 ASSESSMENT — PAIN DESCRIPTION - ONSET: ONSET: ON-GOING

## 2021-01-24 ASSESSMENT — PAIN DESCRIPTION - ORIENTATION: ORIENTATION: MID

## 2021-01-24 ASSESSMENT — PAIN DESCRIPTION - DESCRIPTORS: DESCRIPTORS: ACHING;DISCOMFORT;CONSTANT

## 2021-01-24 NOTE — PROGRESS NOTES
POD#2 Awake, alert. Complains of incisional/chest tube discomfort. Denies CP, palpitations, SOB at rest, dizziness/lightheadedness. Vitals:    01/24/21 0000 01/24/21 0315 01/24/21 0605 01/24/21 0700   BP: 119/66 113/68     Pulse: 115 108     Resp: 22 20     Temp: 97.8 °F (36.6 °C) 97.7 °F (36.5 °C)     TempSrc: Infrared Temporal     SpO2: 92% 94%  99%   Weight:   198 lb 3.2 oz (89.9 kg)    Height:         O2: 4L/NC      Intake/Output Summary (Last 24 hours) at 1/24/2021 0851  Last data filed at 1/24/2021 0100  Gross per 24 hour   Intake 750 ml   Output 835 ml   Net -85 ml         +BM pre-op    UO: 200mL/8hr   CT output: Mediastinal x 2: 50mL/8hr (160mL/24hrs)     Pleural: 10mL/8hr (130mL/24hrs)      Recent Labs     01/22/21  0533 01/22/21  1250 01/23/21  0510   WBC 13.2* 23.3* 17.9*   HGB 12.9 9.3* 8.7*   HCT 37.5 27.2* 26.6*    136 167      Recent Labs     01/21/21  1136 01/22/21  1250 01/23/21  0510   BUN 20 23 29*   CREATININE 1.4* 1.0 1.1         Telemetry: ST with occ PVCs      PE  Cardiac: RR, tachycardia  Lungs: decreased bases  Chest incision with intact ETHEL DSD. Sternum stable. Chest tubes x 3 and Epicardial pacing wires present and secure. Abd: Softly distended, nontender, +BS  Ext: Incisions C/D/I, approximated, no erythema, +incisional ecchymosis, + edema           A/P: POD#2    1. CAD  --Stable s/p CABG x3 (LIMA-LAD, SVG-Ramus, SVG-RCA) on 1/22/2021  --Post op ELISEO reveals normal biventricular function  and no valvular abnormalities  --Scr stable--labs pending for today   --DAPT/statin/BB  --reinforce sternal precautions  --continue epicardial pacing wires  --chest tubes without significant output and without airleaks. Chest tubes removed without difficulty. Patient tolerated well  --place PICC line today--difficult vascular access       2. Acute blood loss anemia secondary to open heart surgery  --stable, awaiting today's labs      3.  ST  --continue BB with hold parameters  --add oral

## 2021-01-24 NOTE — PROGRESS NOTES
[facility] CARDIOLOGY PROGRESS NOTE  [facility]        Subjective: Non-ST elevation myocardial infarction, CABG completed January 22, 2021, diabetes with labile blood sugar, chronic hypertension, chronic pain. Today awakens to his name and is appropriately interactive but appears to be uncomfortable with coughing. He does have a moist cough. He reports he ate without nausea or vomiting. Objective: Labs chart medications and telemetry are all reviewed.     Patient Vitals for the past 24 hrs:   BP Temp Temp src Pulse Resp SpO2 Weight   01/24/21 1345 -- -- -- -- -- 98 % --   01/24/21 1342 115/67 -- -- 122 -- -- --   01/24/21 1202 109/74 97.7 °F (36.5 °C) Temporal 120 17 92 % --   01/24/21 1030 -- -- -- -- -- 91 % --   01/24/21 0845 124/65 97.5 °F (36.4 °C) Temporal 115 19 94 % --   01/24/21 0700 -- -- -- -- -- 99 % --   01/24/21 0605 -- -- -- -- -- -- 198 lb 3.2 oz (89.9 kg)   01/24/21 0315 113/68 97.7 °F (36.5 °C) Temporal 108 20 94 % --   01/24/21 0000 119/66 97.8 °F (36.6 °C) Infrared 115 22 92 % --   01/23/21 2130 120/60 98 °F (36.7 °C) Temporal 121 20 92 % --   01/23/21 2019 -- -- -- -- 20 93 % --   01/23/21 1600 (!) 109/57 97.3 °F (36.3 °C) Temporal 111 12 93 % --         Intake/Output Summary (Last 24 hours) at 1/24/2021 1453  Last data filed at 1/24/2021 0660  Gross per 24 hour   Intake 480 ml   Output 655 ml   Net -175 ml       Wt Readings from Last 3 Encounters:   01/24/21 198 lb 3.2 oz (89.9 kg)       Telemetry: I personally reviewed and shows normal sinus rhythm heart rate 110 bpm.    Current meds: Scheduled Meds:   amiodarone  400 mg Oral TID    enoxaparin  40 mg Subcutaneous Daily    polyethylene glycol  17 g Oral Daily    aspirin  81 mg Oral Daily    bisacodyl  5 mg Oral Daily    sennosides-docusate sodium  1 tablet Oral BID    magnesium hydroxide  30 mL Oral Daily    ferrous sulfate  325 mg Oral BID WC    vitamin C  500 mg Oral BID    folic acid  1 mg Oral Daily    bisacodyl  10 mg Rectal Daily    metoprolol succinate  25 mg Oral Daily    insulin lispro  0-18 Units Subcutaneous TID     insulin lispro  0-9 Units Subcutaneous Nightly    sodium chloride flush  10 mL Intravenous BID    ipratropium-albuterol  1 ampule Inhalation 4x daily    docosanol   Topical 5x Daily    rosuvastatin  20 mg Oral Nightly    magnesium oxide  400 mg Oral Daily    mupirocin   Nasal BID    pantoprazole  40 mg Oral Daily    clopidogrel  75 mg Oral Daily    tamsulosin  0.4 mg Oral Daily    budesonide  0.5 mg Nebulization BID    betamethasone dipropionate   Topical BID    gabapentin  300 mg Oral BID     Continuous Infusions:  PRN Meds:.acetaminophen, HYDROcodone 5 mg - acetaminophen **OR** HYDROcodone 5 mg - acetaminophen, potassium chloride, ondansetron, diphenhydrAMINE, benzocaine-menthol    Allergies: Shrimp (diagnostic) and Valium [diazepam]      Labs:   Recent Labs     01/22/21  1250 01/23/21  0510 01/24/21  0902   WBC 23.3* 17.9* 15.3*   HGB 9.3* 8.7* 8.3*   HCT 27.2* 26.6* 26.0*   MCV 87.2 90.5 91.5    167 154       Labs:   Recent Labs     01/22/21  1250 01/22/21  1250 01/23/21  0510 01/23/21  1929 01/24/21  0902     --  138  --  136   K 4.4   < > 5.3* 5.6* 4.5     --  105  --  96*   CO2 22  --  20*  --  25   BUN 23  --  29*  --  41*   CREATININE 1.0  --  1.1  --  1.2    < > = values in this interval not displayed. Labs: No results for input(s): CKTOTAL, CKMB, CKMBINDEX, TROPONINI in the last 72 hours. Labs: No results for input(s): BNP in the last 72 hours. Labs: No results for input(s): CHOL, HDL, TRIG in the last 72 hours. Invalid input(s): [place]    Labs:   Recent Labs     01/22/21  1250   INR 1.3       Review of systems: No reported significant weight gain or weight loss. no dysuria or frequency, no dizziness, falls or trauma, no change in bowel or bladder habits, no hematochezia, hemoptysis or hematuria.   No fevers, chills, nausea or vomiting reported. No significant wheezing or sputum production. No headache or visual changes. The remainder of the 10 review of systems otherwise negative. Exam      General: Patient comfortable in no distress and currently denies any chest pain. HEENT: Face symmetrical and no apparent cranial nerve deficit. Neck: No jugular venous distention, carotid bruit or thyromegaly. Lungs: Clear bilaterally without rales, wheezes or dullness. Cardiac: Regular rate and rhythm, no S3, S4, no rub or gallop. Abdomen: No rebound or guarding, no hepatosplenomegaly. Extremities: Without significant clubbing , cyanosis, or edema. Neuro:  No focal motor or sensory deficit apparent. Skin: No petechiae, no significant bruising. Assessment: See plan below      Plan: #1 non-ST elevation myocardial infarction and has undergone CABG. Sinus tachycardia likely related to discomfort and would further increase beta-blocker to try to prevent atrial fibrillation. Also receiving amiodarone 400 mg 3 times a day for this reason. #2 hypertension and follow-up blood pressure. #3 non-ST elevation myocardial infarction and continue dual antiplatelet therapy aspirin 81 mg daily, clopidogrel 75 mg daily, beta-blocker, statin. #4 chronic pain and today does appear to be somewhat uncomfortable with limited deep breathing. I encouraged him to use incentive spirometer and hold his chest as he can tolerate.         Electronically signed by Amol Preston MD on 1/24/2021 at 2:53 PM

## 2021-01-24 NOTE — PROGRESS NOTES
[facility]  Department of Internal Medicine  Division of Pulmonary, Critical Care and Sleep Medicine  Progress Note  Amanda Mejia DO, Isidra Ware, [address] [facility], [place], [facility]    Patient: Pily Mack  MRN: [id number]  : 1940    Encounter Time: 12:48 PM     Date of Admission: 1/15/2021 11:51 AM    Primary Care Physician: WILVER uDmont NP    Reason for Consultation: Pre Op assesssment     SUBJECTIVE:  On 2021 underwent 1. Coronary artery bypass grafting x3 using left internal mammary artery to the left anterior descending artery, reverse saphenous vein graft to the ramus intermedius, and reverse saphenous vein graft to the distal right coronary artery. 2.  Lower extremity endoscopic vein harvest. 3.  Rigid sternal fixation with a KLS plating system. 4.  22 modifier. Throat sore - Cepacol   Oral cold sore - Abreva crm or = med per pharm  Seems very sleep - check abg and lower pain meds  Weaning oxygen   Family Q answered      OBJECTIVE:     PHYSICAL EXAM:   VITALS:   Vitals:    21 0700 21 0845 21 1030 21 1202   BP:  124/65  109/74   Pulse:  115  120   Resp:  19  17   Temp:  97.5 °F (36.4 °C)  97.7 °F (36.5 °C)   TempSrc:  Temporal  Temporal   SpO2: 99% 94% 91% 92%   Weight:       Height:            Intake/Output Summary (Last 24 hours) at 2021 1248  Last data filed at 2021 0497  Gross per 24 hour   Intake 480 ml   Output 655 ml   Net -175 ml        CONSTITUTIONAL:   Alert, NAD  SKIN:     No rash, Skin discolorationm fair skin with AK  HEENT:     EOMI, MMM, No thrush  NECK:    No bruits, No JVP apprechiated  CV:      Sinus,  No gallops  PULMONARY:   Couse BS,  No Wheezing, No Rales, No Rhonchi      No noted egophony  ABDOMEN:     Soft, non-tender. BS normal. No R/R/G  EXT:    No deformities . No clubbing. Trace lower extremity edema,   PULSE:   Palpable.   PSYCHIATRIC:  Seems appropriate, No acute psycosis  MS:    No fractures, No gross weakness  NEUROLOGIC:   Non-focal     DATA: IMAGING & TESTING:     LABORATORY TESTS:    CBC:   Lab Results   Component Value Date    WBC 15.3 01/24/2021    RBC 2.84 01/24/2021    HGB 8.3 01/24/2021    HCT 26.0 01/24/2021    MCV 91.5 01/24/2021    MCH 29.2 01/24/2021    MCHC 31.9 01/24/2021    RDW 15.2 01/24/2021     01/24/2021    MPV 10.0 01/24/2021     CMP:    Lab Results   Component Value Date     01/24/2021    K 4.5 01/24/2021    K 4.3 01/16/2021    CL 96 01/24/2021    CO2 25 01/24/2021    BUN 41 01/24/2021    CREATININE 1.2 01/24/2021    GFRAA >60 01/24/2021    LABGLOM 58 01/24/2021    GLUCOSE 343 01/24/2021    PROT 6.7 01/21/2021    LABALBU 3.5 01/21/2021    CALCIUM 9.0 01/24/2021    BILITOT 0.3 01/21/2021    ALKPHOS 117 01/21/2021    AST 23 01/21/2021    ALT 31 01/21/2021     Calcium:    Lab Results   Component Value Date    CALCIUM 9.0 01/24/2021        PRO-BNP:   Lab Results   Component Value Date    PROBNP 4,111 (H) 01/24/2021      ABGs:   Lab Results   Component Value Date    PH 7.332 01/22/2021    PO2 65.7 01/22/2021    PCO2 40.9 01/22/2021     Hemoglobin A1C: No components found for: HGBA1C    IMAGING:  Imaging tests were completed and reviewed and discussed radiology and care team involved and reveals     CHEST RADIOGRAM            Pulmonary function test: Pulmonary function test revealed forced vital capacity of 1.4 at 6 liters, 41% of predicted with an FEV1 of 1.5 at 7 liters, 44% ofpredicted with an FEV1/FVC ratio of 80%. Midflow rates are 60% of predicted with maximum voluntary ventilation at 20 liters per minute, 88% of predicted.     Static lung volumes with total vital capacity of 1.48 liters, 41% of predicted. Inspiratory capacity is 1.36 liters, 45% of predicted. Expiratory reserve volume 0.13 liters, 73% of predicted. The diffusion capacity is 17.17 mL/min per mmHg, which is 25% of predicted. Severely restricted lung physiology with noted moderate-to-severe loss in gas transfer. Echo Complete  TTE procedure:Echo Complete W/Doppler & Color Flow. Procedure Date Date: 01/16/2021 Start: 09:28 AM Study Location: Portable Technical Quality: Adequate visualization Indications:LV function. Patient Status: Routine Height: 67 inches Weight: 195 pounds BSA: 2 m^2 BMI: 30.54 kg/m^2  Findings   Left Ventricle  Left ventricle is normal in size. Normal left ventricular wall thickness. Low normal ejection fraction. Ejection fraction is visually estimated at 55%. There is doppler evidence of stage I diastolic dysfunction. Right Ventricle  Mildly dilated right ventricle. Right ventricle global systolic function is normal.   Left Atrium  Normal sized left atrium. Interatrial septum appears intact. Right Atrium  Normal right atrium size. Mitral Valve  Structurally normal mitral valve. No evidence of mitral valve stenosis. Mild mitral regurgitation is present. Tricuspid Valve  The tricuspid valve appears structurally normal.  Physiologic and/or trace tricuspid regurgitation. Pulmonary hypertension is not present. Aortic Valve  Individual aortic valve leaflets are not clearly visualized. No hemodynamically significant aortic stenosis is present. Mild aortic regurgitation is noted. Pulmonic Valve  The pulmonic valve was not well visualized. No evidence of pulmonic valve stenosis. No evidence of any pulmonic regurgitation. Pericardial Effusion  No evidence of pericardial effusion. Pleural Effusion  No evidence of pleural effusion. Aorta  Aortic root dimension within normal limits. Ct Chest Wo Contrast    Result Date: 1/16/2021  FINDINGS: The heart is normal in size. Atherosclerotic calcifications are associated with the coronary arteries. No pericardial effusion. Ascending thoracic aorta measures 3.2 cm in diameter. Descending thoracic aorta measures 2.4 cm in diameter.   There is a combined origin of brachiocephalic trunk and left common carotid artery at thoracic aortic arch. No mediastinal fluid collections. No mediastinal or hilar lymphadenopathy. There are no airspace opacities. There is a loculated right pleural effusion with adjacent subsegmental atelectasis. No pneumothorax. View of the upper abdomen shows normal bilateral adrenal glands. 1.  Loculated right pleural effusion with adjacent subsegmental atelectasis. 2.  No evidence of pneumonia. 3.  Atherosclerotic calcifications associated with the coronary arteries. Us Carotid Artery Bilateral  Result Date: 1/17/2021  Atherosclerotic disease. No hemodynamically significant stenosis is identified Estimated stenosis by NASCET criteria in the proximal right carotid artery is between 0% and 49%. Estimated stenosis by NASCET criteria in the proximal left carotid artery is between 0% and 49%. Assessment: Vijay Beebe is a 68-year-old gentleman seen for preoperative assessment with abnormal pulmonary assessment revealing severe restrictive physiologic defect with loss and diffusion capacity. The undergo bypass surgery in the next few days and we were asked to comment on preoperative risk. 1. Restrictive physiology  2. MV CAD  3. HLD  4. HTN  5. Loculated pleural effusion    Plan:   1. Adjust  bronchodilators  2. Try to obtain the sputum samples  3. Check ABG  4. Encourage ICS and ambuliation  5. Oxygen support keep >90%  6. CPAP at night - non compliant  7. ICS @ 2 hours to prevent atelectasis  8. Check BNP and may diuresis  9. Cepacol for sore throat  10. PT assessment  11.  CPAP at night      Faheem Alas DO, MPH, FCCP, [place], [place]

## 2021-01-24 NOTE — PLAN OF CARE
Problem: Falls - Risk of:  Goal: Will remain free from falls  Description: Will remain free from falls  Outcome: Met This Shift     Problem: Infection - Surgical Site:  Goal: Will show no infection signs and symptoms  Description: Will show no infection signs and symptoms  Outcome: Met This Shift     Problem: Skin Integrity:  Goal: Will show no infection signs and symptoms  Description: Will show no infection signs and symptoms  Outcome: Met This Shift

## 2021-01-24 NOTE — PROGRESS NOTES
RA seated rest 90-91%    Patient education  Pt educated on role of PT intervention. Pt educated on safety in room with utilization of call light for assistance with mobility. Pt educated on importance of maximizing OOB time by transferring to bedside chair for meals and ambulating to bathroom/transferring to bedside commode with assistance from nursing and therapy staff to increase functional activity tolerance and overall functional independence. Pt educated on sternal precautions. Discussed with pt and pt's daughter who was present at time of eval pt's possible rehab needs. Patient response to education:   Pt verbalized understanding Pt demonstrated skill Pt requires further education in this area   yes yes yes     ASSESSMENT:    Comments:  RN cleared pt for activity prior to session. Pt received supine in bed and agreeable to PT intervention at this time. Pt performed all functional mobility as noted above. Pt limited by generalized fatigue, sternal precautions, and impaired balance. Pt had worsening dynamic standing balance with fatigue with progressively worsening R sided lean. Cardiac rehab staff present to assist with ambulation and management of O2. At end of session, pt transferred to bedside chair and left with all needs met and call light in reach. Pt requires continued skilled PT intervention for the purposes of maximizing functional mobility and independence. Treatment:  Patient practiced and was instructed in the following treatment:     Therapeutic Activities Completed:  o Functional mobility as noted above:   - Bed mobility: modA. Limited by sternal precautions and generalized weakness/fatigue. Pt attempted to use bed rail multiple times requiring VC to redirect and adhere to sternal precautions. - Transfer training: Frantz from [facility] from chair. Pt required cues to adhere to sternal precautions.  Stand pivot no AD Frantz walking>chair.   - Ambulation: 100 feet x 2 reps DPT  [id number]

## 2021-01-25 LAB
ANION GAP SERPL CALCULATED.3IONS-SCNC: 7 MMOL/L (ref 7–16)
BLOOD BANK DISPENSE STATUS: NORMAL
BLOOD BANK PRODUCT CODE: NORMAL
BPU ID: NORMAL
BUN BLDV-MCNC: 43 MG/DL (ref 8–23)
CALCIUM SERPL-MCNC: 8.4 MG/DL (ref 8.6–10.2)
CHLORIDE BLD-SCNC: 97 MMOL/L (ref 98–107)
CO2: 33 MMOL/L (ref 22–29)
CREAT SERPL-MCNC: 1 MG/DL (ref 0.7–1.2)
DESCRIPTION BLOOD BANK: NORMAL
EKG ATRIAL RATE: 99 BPM
EKG P AXIS: 24 DEGREES
EKG P-R INTERVAL: 190 MS
EKG Q-T INTERVAL: 386 MS
EKG QRS DURATION: 92 MS
EKG QTC CALCULATION (BAZETT): 495 MS
EKG R AXIS: 33 DEGREES
EKG T AXIS: 38 DEGREES
EKG VENTRICULAR RATE: 99 BPM
GFR AFRICAN AMERICAN: >60
GFR NON-AFRICAN AMERICAN: >60 ML/MIN/1.73
GLUCOSE BLD-MCNC: 258 MG/DL (ref 74–99)
HCT VFR BLD CALC: 25.1 % (ref 37–54)
HEMOGLOBIN: 8 G/DL (ref 12.5–16.5)
MCH RBC QN AUTO: 29.5 PG (ref 26–35)
MCHC RBC AUTO-ENTMCNC: 31.9 % (ref 32–34.5)
MCV RBC AUTO: 92.6 FL (ref 80–99.9)
METER GLUCOSE: 256 MG/DL (ref 74–99)
METER GLUCOSE: 269 MG/DL (ref 74–99)
METER GLUCOSE: 279 MG/DL (ref 74–99)
METER GLUCOSE: 333 MG/DL (ref 74–99)
PDW BLD-RTO: 15.2 FL (ref 11.5–15)
PLATELET # BLD: 144 E9/L (ref 130–450)
PMV BLD AUTO: 10.7 FL (ref 7–12)
POTASSIUM SERPL-SCNC: 4.3 MMOL/L (ref 3.5–5)
RBC # BLD: 2.71 E12/L (ref 3.8–5.8)
SODIUM BLD-SCNC: 137 MMOL/L (ref 132–146)
WBC # BLD: 11.9 E9/L (ref 4.5–11.5)

## 2021-01-25 PROCEDURE — 51701 INSERT BLADDER CATHETER: CPT

## 2021-01-25 PROCEDURE — 94660 CPAP INITIATION&MGMT: CPT

## 2021-01-25 PROCEDURE — 97166 OT EVAL MOD COMPLEX 45 MIN: CPT

## 2021-01-25 PROCEDURE — 36415 COLL VENOUS BLD VENIPUNCTURE: CPT

## 2021-01-25 PROCEDURE — 2580000003 HC RX 258: Performed by: INTERNAL MEDICINE

## 2021-01-25 PROCEDURE — 6370000000 HC RX 637 (ALT 250 FOR IP): Performed by: NURSE PRACTITIONER

## 2021-01-25 PROCEDURE — 87070 CULTURE OTHR SPECIMN AEROBIC: CPT

## 2021-01-25 PROCEDURE — 6360000002 HC RX W HCPCS: Performed by: NURSE PRACTITIONER

## 2021-01-25 PROCEDURE — 80048 BASIC METABOLIC PNL TOTAL CA: CPT

## 2021-01-25 PROCEDURE — 6360000002 HC RX W HCPCS: Performed by: INTERNAL MEDICINE

## 2021-01-25 PROCEDURE — 82962 GLUCOSE BLOOD TEST: CPT

## 2021-01-25 PROCEDURE — 93798 PHYS/QHP OP CAR RHAB W/ECG: CPT

## 2021-01-25 PROCEDURE — 99233 SBSQ HOSP IP/OBS HIGH 50: CPT | Performed by: INTERNAL MEDICINE

## 2021-01-25 PROCEDURE — 87206 SMEAR FLUORESCENT/ACID STAI: CPT

## 2021-01-25 PROCEDURE — 2140000000 HC CCU INTERMEDIATE R&B

## 2021-01-25 PROCEDURE — 97535 SELF CARE MNGMENT TRAINING: CPT

## 2021-01-25 PROCEDURE — 85027 COMPLETE CBC AUTOMATED: CPT

## 2021-01-25 PROCEDURE — 94640 AIRWAY INHALATION TREATMENT: CPT

## 2021-01-25 PROCEDURE — 6370000000 HC RX 637 (ALT 250 FOR IP): Performed by: THORACIC SURGERY (CARDIOTHORACIC VASCULAR SURGERY)

## 2021-01-25 PROCEDURE — 6370000000 HC RX 637 (ALT 250 FOR IP): Performed by: INTERNAL MEDICINE

## 2021-01-25 PROCEDURE — 2700000000 HC OXYGEN THERAPY PER DAY

## 2021-01-25 PROCEDURE — 51798 US URINE CAPACITY MEASURE: CPT

## 2021-01-25 RX ADMIN — IPRATROPIUM BROMIDE AND ALBUTEROL SULFATE 1 AMPULE: 2.5; .5 SOLUTION RESPIRATORY (INHALATION) at 12:35

## 2021-01-25 RX ADMIN — PANTOPRAZOLE SODIUM 40 MG: 40 TABLET, DELAYED RELEASE ORAL at 08:25

## 2021-01-25 RX ADMIN — INSULIN LISPRO 9 UNITS: 100 INJECTION, SOLUTION INTRAVENOUS; SUBCUTANEOUS at 10:55

## 2021-01-25 RX ADMIN — OXYCODONE HYDROCHLORIDE AND ACETAMINOPHEN 500 MG: 500 TABLET ORAL at 21:46

## 2021-01-25 RX ADMIN — Medication 10 ML: at 21:27

## 2021-01-25 RX ADMIN — ENOXAPARIN SODIUM 40 MG: 40 INJECTION SUBCUTANEOUS at 08:23

## 2021-01-25 RX ADMIN — TAMSULOSIN HYDROCHLORIDE 0.4 MG: 0.4 CAPSULE ORAL at 08:25

## 2021-01-25 RX ADMIN — INSULIN LISPRO 12 UNITS: 100 INJECTION, SOLUTION INTRAVENOUS; SUBCUTANEOUS at 06:54

## 2021-01-25 RX ADMIN — MUPIROCIN: 20 OINTMENT TOPICAL at 21:27

## 2021-01-25 RX ADMIN — Medication 10 ML: at 08:25

## 2021-01-25 RX ADMIN — AMIODARONE HYDROCHLORIDE 400 MG: 200 TABLET ORAL at 21:38

## 2021-01-25 RX ADMIN — MAGNESIUM GLUCONATE 500 MG ORAL TABLET 400 MG: 500 TABLET ORAL at 08:24

## 2021-01-25 RX ADMIN — DIPHENHYDRAMINE HYDROCHLORIDE 25 MG: 25 TABLET ORAL at 00:46

## 2021-01-25 RX ADMIN — HYDROCODONE BITARTRATE AND ACETAMINOPHEN 2 TABLET: 5; 325 TABLET ORAL at 06:40

## 2021-01-25 RX ADMIN — INSULIN LISPRO 5 UNITS: 100 INJECTION, SOLUTION INTRAVENOUS; SUBCUTANEOUS at 23:21

## 2021-01-25 RX ADMIN — ROSUVASTATIN 20 MG: 20 TABLET, FILM COATED ORAL at 21:39

## 2021-01-25 RX ADMIN — ACETAMINOPHEN 650 MG: 325 TABLET, FILM COATED ORAL at 00:45

## 2021-01-25 RX ADMIN — ASPIRIN 81 MG: 81 TABLET, COATED ORAL at 08:24

## 2021-01-25 RX ADMIN — DOCUSATE SODIUM 50 MG AND SENNOSIDES 8.6 MG 1 TABLET: 8.6; 5 TABLET, FILM COATED ORAL at 21:39

## 2021-01-25 RX ADMIN — CLOPIDOGREL 75 MG: 75 TABLET, FILM COATED ORAL at 08:24

## 2021-01-25 RX ADMIN — GABAPENTIN 300 MG: 300 CAPSULE ORAL at 08:25

## 2021-01-25 RX ADMIN — BISACODYL 10 MG: 10 SUPPOSITORY RECTAL at 08:25

## 2021-01-25 RX ADMIN — PIPERACILLIN AND TAZOBACTAM 3375 MG: 3; .375 INJECTION, POWDER, LYOPHILIZED, FOR SOLUTION INTRAVENOUS at 13:10

## 2021-01-25 RX ADMIN — BUDESONIDE 500 MCG: 0.5 SUSPENSION RESPIRATORY (INHALATION) at 08:58

## 2021-01-25 RX ADMIN — IPRATROPIUM BROMIDE AND ALBUTEROL SULFATE 1 AMPULE: 2.5; .5 SOLUTION RESPIRATORY (INHALATION) at 16:17

## 2021-01-25 RX ADMIN — FERROUS SULFATE TAB 325 MG (65 MG ELEMENTAL FE) 325 MG: 325 (65 FE) TAB at 08:24

## 2021-01-25 RX ADMIN — POLYETHYLENE GLYCOL 3350 17 G: 17 POWDER, FOR SOLUTION ORAL at 08:24

## 2021-01-25 RX ADMIN — AMIODARONE HYDROCHLORIDE 400 MG: 200 TABLET ORAL at 08:24

## 2021-01-25 RX ADMIN — INSULIN LISPRO 9 UNITS: 100 INJECTION, SOLUTION INTRAVENOUS; SUBCUTANEOUS at 17:03

## 2021-01-25 RX ADMIN — FOLIC ACID 1 MG: 1 TABLET ORAL at 08:24

## 2021-01-25 RX ADMIN — FERROUS SULFATE TAB 325 MG (65 MG ELEMENTAL FE) 325 MG: 325 (65 FE) TAB at 17:03

## 2021-01-25 RX ADMIN — IPRATROPIUM BROMIDE AND ALBUTEROL SULFATE 1 AMPULE: 2.5; .5 SOLUTION RESPIRATORY (INHALATION) at 08:58

## 2021-01-25 RX ADMIN — OXYCODONE HYDROCHLORIDE AND ACETAMINOPHEN 500 MG: 500 TABLET ORAL at 08:24

## 2021-01-25 RX ADMIN — BUDESONIDE 500 MCG: 0.5 SUSPENSION RESPIRATORY (INHALATION) at 19:24

## 2021-01-25 RX ADMIN — PIPERACILLIN AND TAZOBACTAM 3375 MG: 3; .375 INJECTION, POWDER, LYOPHILIZED, FOR SOLUTION INTRAVENOUS at 21:26

## 2021-01-25 RX ADMIN — BETAMETHASONE DIPROPIONATE: 0.5 CREAM TOPICAL at 10:57

## 2021-01-25 RX ADMIN — MUPIROCIN: 20 OINTMENT TOPICAL at 08:26

## 2021-01-25 RX ADMIN — BISACODYL 5 MG: 5 TABLET, COATED ORAL at 08:24

## 2021-01-25 RX ADMIN — GABAPENTIN 300 MG: 300 CAPSULE ORAL at 21:38

## 2021-01-25 RX ADMIN — IPRATROPIUM BROMIDE AND ALBUTEROL SULFATE 1 AMPULE: 2.5; .5 SOLUTION RESPIRATORY (INHALATION) at 19:24

## 2021-01-25 RX ADMIN — AMIODARONE HYDROCHLORIDE 400 MG: 200 TABLET ORAL at 15:05

## 2021-01-25 RX ADMIN — MAGNESIUM HYDROXIDE 30 ML: 2400 SUSPENSION ORAL at 08:24

## 2021-01-25 RX ADMIN — DOCUSATE SODIUM 50 MG AND SENNOSIDES 8.6 MG 1 TABLET: 8.6; 5 TABLET, FILM COATED ORAL at 08:24

## 2021-01-25 ASSESSMENT — PAIN SCALES - GENERAL
PAINLEVEL_OUTOF10: 0
PAINLEVEL_OUTOF10: 7
PAINLEVEL_OUTOF10: 4

## 2021-01-25 ASSESSMENT — PAIN DESCRIPTION - DESCRIPTORS: DESCRIPTORS: TENDER;SORE;ACHING

## 2021-01-25 ASSESSMENT — PAIN DESCRIPTION - LOCATION: LOCATION: INCISION;STERNUM

## 2021-01-25 ASSESSMENT — PAIN DESCRIPTION - PAIN TYPE: TYPE: SURGICAL PAIN

## 2021-01-25 NOTE — PLAN OF CARE
Problem: Falls - Risk of:  Goal: Will remain free from falls  Description: Will remain free from falls  1/25/2021 1139 by Nataly Jackson RN  Outcome: Met This Shift     Problem: Infection - Surgical Site:  Goal: Will show no infection signs and symptoms  Description: Will show no infection signs and symptoms  Outcome: Met This Shift

## 2021-01-25 NOTE — PROGRESS NOTES
Patient unable to void throughout entire shift- bladder scan showing 473, CTS notified & orders obtained

## 2021-01-25 NOTE — PROGRESS NOTES
POD#3  Awake, alert. No major complaints other than expected fatigue and chest wall pain. Denies CP, palpitations, SOB at rest, dizziness/lightheadedness. Vitals:    01/25/21 0759 01/25/21 0822 01/25/21 0900 01/25/21 1018   BP: 89/60 (!) 92/0  (!) 99/58   Pulse: 57 102  91   Resp: 16   16   Temp: 97.8 °F (36.6 °C)   96.8 °F (36 °C)   TempSrc: Oral   Temporal   SpO2: 96%  95% 96%   Weight:       Height:         O2: 2 L/NC      Intake/Output Summary (Last 24 hours) at 1/25/2021 1127  Last data filed at 1/25/2021 4238  Gross per 24 hour   Intake 480 ml   Output 520 ml   Net -40 ml         No BM yet        Recent Labs     01/23/21  0510 01/24/21  0902 01/25/21  0437   WBC 17.9* 15.3* 11.9*   HGB 8.7* 8.3* 8.0*   HCT 26.6* 26.0* 25.1*    154 144      Recent Labs     01/23/21  0510 01/24/21  0902 01/25/21  0437   BUN 29* 41* 43*   CREATININE 1.1 1.2 1.0           PE  Cardiac: RRR  Lungs: decreased bases, upper airway mucus congestion  Chest incision with intact ETHEL DSD. Sternum stable. Prior chest tube site incisions C/D/I, no erythema with intact sutures. Epicardial pacing wires present and secure. Abd: Soft, nontender, +BS  Ext: Incisions C/D/I, approximated, no erythema, + edema       A/P: POD# 3    1. CAD  --Stable s/p CABG x3 (LIMA-LAD, SVG-Ramus, SVG-RCA) on 1/22/2021  --Post op ELISEO reveals normal biventricular function  and no valvular abnormalities  --Scr stable  --DAPT/statin/BB  --reinforce sternal precautions  --continue epicardial pacing wires  --all chest tubes out  --PICC in place        2. Acute blood loss anemia secondary to open heart surgery  --hgb 8.0- d/w dr Isaiah Saxena, watch for now; if drops more will transfuse     3. ST  --continue BB with hold parameters  --add oral amiodarone for afib prophylaxis--with plans to taper on dc        4.  Prolonged postoperative respiratory insufficiency  --wean oxygen to keep SpO2 greater than or equal to 92%  --continue duonebs with ezpap  --encourage C&DB, SMI  --currently on 2L O2/NC  --pulmonary following         5. Constipation/abdominal distension  --no BM since prior to surgery  --Continue daily MOM until +BM and senna-s as scheduled. --Will give daily suppository until +BM   --add glycolax  --Encouraged continued increase in oral intake and activity.         6. DM 2  --hgA1c 6.6  --add home glipizide once improvement in oral intake   --continue high scale SSI  --recent steroid use may be adding to hyperglycemia  --monitor         7. Post operative deconditioning  --Increase activity as tolerated  --PT/OT  --currently ambulating 50ft    8.  Cough with swallowing  - bedside swallow eval today           DVT prophylaxis:  --continue bilateral knee high NICOLASA hose  --continue PCDs  --continue progressive ambulation  --on lovenox for dvt prophylaxis and continue knee high NICOLASA hose/pcds/progressive ambulation        Dispo: will likely need rehab     This patient's case and care plan was discussed with the attending surgeon

## 2021-01-25 NOTE — PROGRESS NOTES
[facility] CARDIOLOGY PROGRESS NOTE  [facility]        Subjective: Postoperative day #3 status post CABG, diabetes, presented with non-ST elevation myocardial infarction, chronic hypertension. Currently receiving an aerosol and very poor cough that is moist.  Does not really bring up any sputum. Objective: Telemetry, medications, chart and labs all reviewed. Patient Vitals for the past 24 hrs:   BP Temp Temp src Pulse Resp SpO2 Weight   01/25/21 1239 -- -- -- -- -- 92 % --   01/25/21 1018 (!) 99/58 96.8 °F (36 °C) Temporal 91 16 96 % --   01/25/21 0900 -- -- -- -- -- 95 % --   01/25/21 0822 (!) 92/0 -- -- 102 -- -- --   01/25/21 0759 89/60 97.8 °F (36.6 °C) Oral 57 16 96 % --   01/25/21 0652 -- -- -- -- -- -- 199 lb 9.6 oz (90.5 kg)   01/25/21 0533 114/60 97.5 °F (36.4 °C) Temporal 104 18 93 % --   01/25/21 0004 126/64 97.9 °F (36.6 °C) Temporal 117 18 94 % --   01/24/21 2135 -- -- -- -- -- 93 % --   01/24/21 1858 122/68 98.2 °F (36.8 °C) Temporal 123 20 95 % --   01/24/21 1754 -- -- -- -- -- 98 % --   01/24/21 1345 -- -- -- -- -- 98 % --   01/24/21 1342 115/67 97.5 °F (36.4 °C) Temporal 122 17 -- --         Intake/Output Summary (Last 24 hours) at 1/25/2021 1259  Last data filed at 1/25/2021 9783  Gross per 24 hour   Intake 480 ml   Output 520 ml   Net -40 ml       Wt Readings from Last 3 Encounters:   01/25/21 199 lb 9.6 oz (90.5 kg)       Telemetry: And shows normal sinus rhythm heart rate 100.   I personally reviewed    Current meds: Scheduled Meds:   piperacillin-tazobactam  3,375 mg Intravenous Q8H    amiodarone  400 mg Oral TID    enoxaparin  40 mg Subcutaneous Daily    polyethylene glycol  17 g Oral Daily    aspirin  81 mg Oral Daily    bisacodyl  5 mg Oral Daily    sennosides-docusate sodium  1 tablet Oral BID    magnesium hydroxide  30 mL Oral Daily    ferrous sulfate  325 mg Oral BID WC    vitamin C  500 mg Oral BID    folic acid  1 mg Oral Daily    bisacodyl  10 mg Rectal Daily  metoprolol succinate  25 mg Oral Daily    insulin lispro  0-18 Units Subcutaneous TID     insulin lispro  0-9 Units Subcutaneous Nightly    sodium chloride flush  10 mL Intravenous BID    ipratropium-albuterol  1 ampule Inhalation 4x daily    docosanol   Topical 5x Daily    rosuvastatin  20 mg Oral Nightly    magnesium oxide  400 mg Oral Daily    mupirocin   Nasal BID    pantoprazole  40 mg Oral Daily    clopidogrel  75 mg Oral Daily    tamsulosin  0.4 mg Oral Daily    budesonide  0.5 mg Nebulization BID    betamethasone dipropionate   Topical BID    gabapentin  300 mg Oral BID     Continuous Infusions:  PRN Meds:.acetaminophen, HYDROcodone 5 mg - acetaminophen **OR** HYDROcodone 5 mg - acetaminophen, potassium chloride, ondansetron, diphenhydrAMINE, benzocaine-menthol    Allergies: Shrimp (diagnostic) and Valium [diazepam]      Labs:   Recent Labs     01/23/21  0510 01/24/21  0902 01/25/21  0437   WBC 17.9* 15.3* 11.9*   HGB 8.7* 8.3* 8.0*   HCT 26.6* 26.0* 25.1*   MCV 90.5 91.5 92.6    154 144       Labs:   Recent Labs     01/23/21  0510 01/23/21  1929 01/24/21  0902 01/25/21  0437     --  136 137   K 5.3* 5.6* 4.5 4.3     --  96* 97*   CO2 20*  --  25 33*   BUN 29*  --  41* 43*   CREATININE 1.1  --  1.2 1.0       Labs: No results for input(s): CKTOTAL, CKMB, CKMBINDEX, TROPONINI in the last 72 hours. Labs: No results for input(s): BNP in the last 72 hours. Labs: No results for input(s): CHOL, HDL, TRIG in the last 72 hours. Invalid input(s): CHOLHDLR, LDLCALCU    Labs: No results for input(s): PROT, INR in the last 72 hours. Review of systems: No reported significant weight gain or weight loss. no dysuria or frequency, no dizziness, falls or trauma, no change in bowel or bladder habits, no hematochezia, hemoptysis or hematuria. No fevers, chills, nausea or vomiting reported. No significant wheezing or sputum production. No headache or visual changes.   The remainder of the 10 review of systems otherwise negative. Exam      General: Patient comfortable in no distress and currently denies any chest pain. HEENT: Face symmetrical and no apparent cranial nerve deficit. Neck: No jugular venous distention, carotid bruit or thyromegaly. Lungs: Clear bilaterally without rales, wheezes or dullness. Cardiac: Regular rate and rhythm, no S3, S4, no rub or gallop. Abdomen: No rebound or guarding, no hepatosplenomegaly. Extremities: Without significant clubbing , cyanosis, or edema. Neuro:  No focal motor or sensory deficit apparent. Skin: No petechiae, no significant bruising. Assessment: See plan below      Plan: #1 non-ST elevation myocardial infarction with extensive CAD and underwent CABG. Encouraged use of incentive spirometer again as tolerated but very poor cough. #2 hypertension and follow-up blood pressure. #3  diabetes and follow-up blood sugar. Diabetic education reviewed. #4 chronic pain and encouraged him to support his chest when he coughs. I suspect increased risk of future atelectasis, bronchitis or pneumonia risk. Mobilize to chair as tolerated.         Electronically signed by Emmanuel Luis MD on 1/25/2021 at 12:59 PM

## 2021-01-25 NOTE — PATIENT CARE CONFERENCE
[facility] Quality Flow/Interdisciplinary Rounds Progress Note        Quality Flow Rounds held on January 25, 2021    Disciplines Attending:  Bedside Nurse, ,  and Nursing Unit Anastasiya Martinez was admitted on 1/15/2021 11:51 AM    Anticipated Discharge Date:  Expected Discharge Date: 01/29/21    Disposition:    Denny Score:  Denny Scale Score: 18    Readmission Risk              Risk of Unplanned Readmission:        13           Discussed patient goal for the day, patient clinical progression, and barriers to discharge.   The following Goal(s) of the Day/Commitment(s) have been identified:  to have at least 240ml of urine this shift      Theodora Garcia  January 25, 2021

## 2021-01-25 NOTE — PROGRESS NOTES
OCCUPATIONAL THERAPY INITIAL EVALUATION      Date:2021  Patient Name: Aaron Harding  MRN: [id number]  : 1940  Room: [address] [place] [address]    Evaluating OT: OSVALDO Acosta, OTR/L [id number]    AM-PAC Daily Activity Raw Score:   Recommended Adaptive Equipment:  TBD     Diagnosis: NSTEMI   Referring Practitioner: WILVER Evans CNP  Surgery:  CABG x3   Pertinent Medical History: HTN, DM, COPD, CA, CAD, HLD, Covid (2020)   Precautions:  Falls, O2, sternal, drain     Home Living: Pt lives with son in a 1 story home with step(s) to enter; bed/bath on main level   Bathroom setup: walk in shower   Equipment owned: reacher, cane, monty  Prior Level of Function: IND with ADLs , assist with IADLs; using no AD for functional mobility   Driving: no    Pain Level: 6/10 at chest  Cognition: A&O: 3/4; Follows 1 step directions, with repetition and increased time   Memory:  fair    Sequencing:  fair    Problem solving:  fair    Judgement/safety:  fair     Functional Assessment:   Initial Eval Status  Date: 21 Treatment Status  Date: Short Term Goals  Treatment frequency: 2-4 x/week   Feeding SUP   IND   Grooming Min A (pt completed facil washing seated EOB with assist for balance)   SUP   UB Dressing Mod A   Min A   LB Dressing Dep (assist with B socks)  Mod A    Bathing Max A (simulated)  Min A    Toileting Dep (assist with jose hygiene and bed pan)  Mod A   Bed Mobility  Log roll: Max A  Supine to sit: Max A   Sit to supine: Mod A   Log roll Min A  Supine to sit: Min A   Sit to supine: Min A   Functional Transfers Sit to stand: Max A   Stand to sit: Max A  Commode: TBA  Min A   Functional Mobility NT (due to line management, urgency with toileting task, and RN report of pt's hypotension)  Min A   Balance Sitting: Min A (posterior lean)  Standing:  Max A     Activity Tolerance Fair- (SpO2>95% during activity)     Visual/  Perceptual Glasses: yes              Hand dominance: L  UE ROM: BUE: elbow flex WFL, shoulder flex 90'  Strength: BUE: grossly observed 3/5    Strength: B WFL  Fine Motor Coordination:  fair    Hearing: WFL  Sensation:  No c/o numbness/tingling   Tone:  WFL  Edema: BLEs                            Comments:Cleared by RN to see pt. Upon arrival, patient supine in bed and agreeable to OT session. At end of session, patient supine in bed with call light and phone within reach, all lines and tubes intact. Pt would benefit from continued OT to increase functional independence and quality of life. Treatment:  Pt required vc's for proper technique/safety with hand placement/body mechanics/posture for bed mobility/ADLs/functional tranfers. Pt required vc's for sequencing/initiation of ADLs/functional transfers. Pt able to sit EOB ~10 mins to increase core strength/balance/activity tolerance for ease with ADLs. Pt educated on sternal precautions. Pt required increased time to complete ADLs/functional transfers due to management of multiple lines. Pt required skilled monitoring of SpO2 during session and education on energy conservation techniques. Pt required rest breaks during session and educated on pursed lip breathing. Pt appeared to have tolerated session well and appears cooperative. Pt instructed on use of call light for assistance and fall prevention. Pt demo'ing fair understanding of education provided. Continue to educate. Eval Complexity: moderate  · History: Expanded chart review of medical records and additional review of physical, cognitive, or psychosocial history related to current functional performance  · Exam: 3+ performance deficits  · Assistance/Modification: Min/mod assistance or modifications required to perform tasks. May have comorbidities that affect occupational performance.     Assessment of current deficits   Functional mobility [x]  ADLs [x] Strength [x]  Cognition [x]  Functional transfers  [x] IADLs [x] Safety Awareness [x]  Endurance [x]  Fine Motor Coordination [] Balance [x] Vision/perception [] Sensation []   Gross Motor Coordination [] ROM [] Delirium []                  Motor Control []    Plan of Care: Instruction/training on adapted ADL techniques and AE recommendations to increase functional independence within precautions  Training on energy conservation strategies/techniques to improve independence/tolerance for self-care routine  Functional transfer/mobility training/DME recommendations for increased independence, safety, and fall prevention  Patient/Family education to increase follow through with safety techniques and functional independence  Recommendation of environmental modifications for increased safety with functional transfers/mobility and ADLs  Therapeutic exercise to improve motor endurance, ROM, and functional strength for ADLs/functional transfers  Therapeutic activities to facilitate/challenge dynamic balance, stand tolerance, fine motor dexterity/in-hand manipulation for increased independence with ADLs  Neuro-muscular re-education: facilitation of righting/equilibrium reactions, midline orientation, scapular stability/mobility, normalization of muscle tone, and facilitation of volitional active controled movement    2-4 DAYS/WK FOR 1-2 WEEKS prn    Rehab Potential: Good for established goals, pt. assisted in establishment of goals. LTG: maximize independence with ADLs to return to PLOF    Patient instructed on diagnosis, prognosis/goals and plan of care. Demonstrated fair understanding. [] Malnutrition indicators have been identified and nursing has been notified to ensure a dietitian consult is ordered. Evaluation time includes thorough review of current medical information, gathering information on past medical & social history & PLOF, completion of standardized testing, informal observation of tasks, consultation with other medical professions/disciplines, assessment of data & development of POC/goals.      moderate Evaluation + Treatment Time In: 1:25        Treatment Time Out: 1:45           Treatment Charges: Mins Units   Ther Ex  32419       Manual Therapy 87610       Thera Activities 72843       ADL/Home Mgt [id number] 20 1   Neuro Re-ed 07561       Group Therapy        Orthotic manage/training  24082       Non-Billable Time       Total Timed Treatment [address], [place], OTR/L [id number]

## 2021-01-25 NOTE — PROGRESS NOTES
IMAGING & TESTING:     LABORATORY TESTS:    CBC:   Lab Results   Component Value Date    WBC 11.9 01/25/2021    RBC 2.71 01/25/2021    HGB 8.0 01/25/2021    HCT 25.1 01/25/2021    MCV 92.6 01/25/2021    MCH 29.5 01/25/2021    MCHC 31.9 01/25/2021    RDW 15.2 01/25/2021     01/25/2021    MPV 10.7 01/25/2021     CMP:    Lab Results   Component Value Date     01/25/2021    K 4.3 01/25/2021    K 4.3 01/16/2021    CL 97 01/25/2021    CO2 33 01/25/2021    BUN 43 01/25/2021    CREATININE 1.0 01/25/2021    GFRAA >60 01/25/2021    LABGLOM >60 01/25/2021    GLUCOSE 258 01/25/2021    PROT 6.7 01/21/2021    LABALBU 3.5 01/21/2021    CALCIUM 8.4 01/25/2021    BILITOT 0.3 01/21/2021    ALKPHOS 117 01/21/2021    AST 23 01/21/2021    ALT 31 01/21/2021     Calcium:    Lab Results   Component Value Date    CALCIUM 8.4 01/25/2021        PRO-BNP:   Lab Results   Component Value Date    PROBNP 4,111 (H) 01/24/2021      ABGs:   Lab Results   Component Value Date    PH 7.332 01/22/2021    PO2 65.7 01/22/2021    PCO2 40.9 01/22/2021     Hemoglobin A1C: No components found for: HGBA1C    IMAGING:  Imaging tests were completed and reviewed and discussed radiology and care team involved and reveals     CHEST RADIOGRAM            Pulmonary function test: Pulmonary function test revealed forced vital capacity of 1.4 at 6 liters, 41% of predicted with an FEV1 of 1.5 at 7 liters, 44% ofpredicted with an FEV1/FVC ratio of 80%. Midflow rates are 60% of predicted with maximum voluntary ventilation at 20 liters per minute, 88% of predicted.     Static lung volumes with total vital capacity of 1.48 liters, 41% of predicted. Inspiratory capacity is 1.36 liters, 45% of predicted. Expiratory reserve volume 0.13 liters, 73% of predicted. The diffusion capacity is 17.17 mL/min per mmHg, which is 25% of predicted. Severely restricted lung physiology with noted moderate-to-severe loss in gas transfer.       Echo Complete  TTE procedure:Echo Complete W/Doppler & Color Flow. Procedure Date Date: 01/16/2021 Start: 09:28 AM Study Location: Portable Technical Quality: Adequate visualization Indications:LV function. Patient Status: Routine Height: 67 inches Weight: 195 pounds BSA: 2 m^2 BMI: 30.54 kg/m^2  Findings   Left Ventricle  Left ventricle is normal in size. Normal left ventricular wall thickness. Low normal ejection fraction. Ejection fraction is visually estimated at 55%. There is doppler evidence of stage I diastolic dysfunction. Right Ventricle  Mildly dilated right ventricle. Right ventricle global systolic function is normal.   Left Atrium  Normal sized left atrium. Interatrial septum appears intact. Right Atrium  Normal right atrium size. Mitral Valve  Structurally normal mitral valve. No evidence of mitral valve stenosis. Mild mitral regurgitation is present. Tricuspid Valve  The tricuspid valve appears structurally normal.  Physiologic and/or trace tricuspid regurgitation. Pulmonary hypertension is not present. Aortic Valve  Individual aortic valve leaflets are not clearly visualized. No hemodynamically significant aortic stenosis is present. Mild aortic regurgitation is noted. Pulmonic Valve  The pulmonic valve was not well visualized. No evidence of pulmonic valve stenosis. No evidence of any pulmonic regurgitation. Pericardial Effusion  No evidence of pericardial effusion. Pleural Effusion  No evidence of pleural effusion. Aorta  Aortic root dimension within normal limits. Ct Chest Wo Contrast    Result Date: 1/16/2021  FINDINGS: The heart is normal in size. Atherosclerotic calcifications are associated with the coronary arteries. No pericardial effusion. Ascending thoracic aorta measures 3.2 cm in diameter. Descending thoracic aorta measures 2.4 cm in diameter. There is a combined origin of brachiocephalic trunk and left common carotid artery at thoracic aortic arch.   No mediastinal fluid collections. No mediastinal or hilar lymphadenopathy. There are no airspace opacities. There is a loculated right pleural effusion with adjacent subsegmental atelectasis. No pneumothorax. View of the upper abdomen shows normal bilateral adrenal glands. 1.  Loculated right pleural effusion with adjacent subsegmental atelectasis. 2.  No evidence of pneumonia. 3.  Atherosclerotic calcifications associated with the coronary arteries. Us Carotid Artery Bilateral  Result Date: 1/17/2021  Atherosclerotic disease. No hemodynamically significant stenosis is identified Estimated stenosis by NASCET criteria in the proximal right carotid artery is between 0% and 49%. Estimated stenosis by NASCET criteria in the proximal left carotid artery is between 0% and 49%. 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 The undergo bypass surgery in the next few days and we were asked to comment on preoperative risk. 1. Restrictive physiology  2. MV CAD  3. HLD  4. HTN  5. Loculated pleural effusion    Plan:   1. Continue with bronchodilators  2. Try to obtain the sputum samples -   3. Start Zosyn given the image of sputum   4. PEP valve  5. Encourage ICS and ambuliation  6. Oxygen support keep >90%  7. CPAP at night   8. ICS @ 2 hours to prevent atelectasis  9.  Continue PT       An Avila DO, MPH, FCCP, Mike Jimenez

## 2021-01-26 ENCOUNTER — APPOINTMENT (OUTPATIENT)
Dept: GENERAL RADIOLOGY | Age: 81
DRG: 233 | End: 2021-01-26
Attending: INTERNAL MEDICINE
Payer: MEDICARE

## 2021-01-26 LAB
ABO/RH: NORMAL
ALBUMIN SERPL-MCNC: 3.3 G/DL (ref 3.5–5.2)
ALP BLD-CCNC: 104 U/L (ref 40–129)
ALT SERPL-CCNC: 17 U/L (ref 0–40)
AMYLASE: 104 U/L (ref 20–100)
ANION GAP SERPL CALCULATED.3IONS-SCNC: 5 MMOL/L (ref 7–16)
ANTIBODY SCREEN: NORMAL
AST SERPL-CCNC: 39 U/L (ref 0–39)
BILIRUB SERPL-MCNC: 0.7 MG/DL (ref 0–1.2)
BILIRUBIN DIRECT: 0.3 MG/DL (ref 0–0.3)
BILIRUBIN, INDIRECT: 0.4 MG/DL (ref 0–1)
BLOOD BANK DISPENSE STATUS: NORMAL
BLOOD BANK PRODUCT CODE: NORMAL
BPU ID: NORMAL
BUN BLDV-MCNC: 33 MG/DL (ref 8–23)
CALCIUM SERPL-MCNC: 8.2 MG/DL (ref 8.6–10.2)
CHLORIDE BLD-SCNC: 96 MMOL/L (ref 98–107)
CO2: 35 MMOL/L (ref 22–29)
CREAT SERPL-MCNC: 1 MG/DL (ref 0.7–1.2)
DESCRIPTION BLOOD BANK: NORMAL
GFR AFRICAN AMERICAN: >60
GFR NON-AFRICAN AMERICAN: >60 ML/MIN/1.73
GLUCOSE BLD-MCNC: 205 MG/DL (ref 74–99)
HCT VFR BLD CALC: 24.5 % (ref 37–54)
HCT VFR BLD CALC: 25.7 % (ref 37–54)
HEMOGLOBIN: 7.7 G/DL (ref 12.5–16.5)
HEMOGLOBIN: 8.4 G/DL (ref 12.5–16.5)
LIPASE: 36 U/L (ref 13–60)
MCH RBC QN AUTO: 29.5 PG (ref 26–35)
MCHC RBC AUTO-ENTMCNC: 31.4 % (ref 32–34.5)
MCV RBC AUTO: 93.9 FL (ref 80–99.9)
METER GLUCOSE: 178 MG/DL (ref 74–99)
METER GLUCOSE: 229 MG/DL (ref 74–99)
METER GLUCOSE: 288 MG/DL (ref 74–99)
METER GLUCOSE: 360 MG/DL (ref 74–99)
PDW BLD-RTO: 15.6 FL (ref 11.5–15)
PLATELET # BLD: 157 E9/L (ref 130–450)
PMV BLD AUTO: 10.1 FL (ref 7–12)
POTASSIUM SERPL-SCNC: 4.4 MMOL/L (ref 3.5–5)
RBC # BLD: 2.61 E12/L (ref 3.8–5.8)
SODIUM BLD-SCNC: 136 MMOL/L (ref 132–146)
TOTAL PROTEIN: 5.5 G/DL (ref 6.4–8.3)
WBC # BLD: 9.6 E9/L (ref 4.5–11.5)

## 2021-01-26 PROCEDURE — 99233 SBSQ HOSP IP/OBS HIGH 50: CPT | Performed by: INTERNAL MEDICINE

## 2021-01-26 PROCEDURE — 6360000002 HC RX W HCPCS: Performed by: INTERNAL MEDICINE

## 2021-01-26 PROCEDURE — 86923 COMPATIBILITY TEST ELECTRIC: CPT

## 2021-01-26 PROCEDURE — 36430 TRANSFUSION BLD/BLD COMPNT: CPT

## 2021-01-26 PROCEDURE — 86901 BLOOD TYPING SEROLOGIC RH(D): CPT

## 2021-01-26 PROCEDURE — 2700000000 HC OXYGEN THERAPY PER DAY

## 2021-01-26 PROCEDURE — 05HA33Z INSERTION OF INFUSION DEVICE INTO LEFT BRACHIAL VEIN, PERCUTANEOUS APPROACH: ICD-10-PCS | Performed by: THORACIC SURGERY (CARDIOTHORACIC VASCULAR SURGERY)

## 2021-01-26 PROCEDURE — 36415 COLL VENOUS BLD VENIPUNCTURE: CPT

## 2021-01-26 PROCEDURE — 6370000000 HC RX 637 (ALT 250 FOR IP): Performed by: PHYSICIAN ASSISTANT

## 2021-01-26 PROCEDURE — 6370000000 HC RX 637 (ALT 250 FOR IP): Performed by: NURSE PRACTITIONER

## 2021-01-26 PROCEDURE — 2580000003 HC RX 258: Performed by: INTERNAL MEDICINE

## 2021-01-26 PROCEDURE — 86900 BLOOD TYPING SEROLOGIC ABO: CPT

## 2021-01-26 PROCEDURE — 36569 INSJ PICC 5 YR+ W/O IMAGING: CPT

## 2021-01-26 PROCEDURE — 6370000000 HC RX 637 (ALT 250 FOR IP): Performed by: THORACIC SURGERY (CARDIOTHORACIC VASCULAR SURGERY)

## 2021-01-26 PROCEDURE — 85027 COMPLETE CBC AUTOMATED: CPT

## 2021-01-26 PROCEDURE — 6360000002 HC RX W HCPCS: Performed by: NURSE PRACTITIONER

## 2021-01-26 PROCEDURE — P9016 RBC LEUKOCYTES REDUCED: HCPCS

## 2021-01-26 PROCEDURE — 93798 PHYS/QHP OP CAR RHAB W/ECG: CPT

## 2021-01-26 PROCEDURE — C1751 CATH, INF, PER/CENT/MIDLINE: HCPCS

## 2021-01-26 PROCEDURE — 80048 BASIC METABOLIC PNL TOTAL CA: CPT

## 2021-01-26 PROCEDURE — 83690 ASSAY OF LIPASE: CPT

## 2021-01-26 PROCEDURE — 6370000000 HC RX 637 (ALT 250 FOR IP): Performed by: INTERNAL MEDICINE

## 2021-01-26 PROCEDURE — 74018 RADEX ABDOMEN 1 VIEW: CPT

## 2021-01-26 PROCEDURE — 80076 HEPATIC FUNCTION PANEL: CPT

## 2021-01-26 PROCEDURE — 76937 US GUIDE VASCULAR ACCESS: CPT

## 2021-01-26 PROCEDURE — 82150 ASSAY OF AMYLASE: CPT

## 2021-01-26 PROCEDURE — 2140000000 HC CCU INTERMEDIATE R&B

## 2021-01-26 PROCEDURE — 86850 RBC ANTIBODY SCREEN: CPT

## 2021-01-26 PROCEDURE — 82962 GLUCOSE BLOOD TEST: CPT

## 2021-01-26 PROCEDURE — 85014 HEMATOCRIT: CPT

## 2021-01-26 PROCEDURE — 94660 CPAP INITIATION&MGMT: CPT

## 2021-01-26 PROCEDURE — 85018 HEMOGLOBIN: CPT

## 2021-01-26 RX ORDER — SODIUM CHLORIDE 9 MG/ML
INJECTION, SOLUTION INTRAVENOUS PRN
Status: DISCONTINUED | OUTPATIENT
Start: 2021-01-26 | End: 2021-02-01 | Stop reason: HOSPADM

## 2021-01-26 RX ORDER — AMIODARONE HYDROCHLORIDE 200 MG/1
200 TABLET ORAL 2 TIMES DAILY
Status: DISCONTINUED | OUTPATIENT
Start: 2021-01-26 | End: 2021-01-28

## 2021-01-26 RX ADMIN — CLOPIDOGREL 75 MG: 75 TABLET, FILM COATED ORAL at 09:42

## 2021-01-26 RX ADMIN — ROSUVASTATIN 20 MG: 20 TABLET, FILM COATED ORAL at 21:51

## 2021-01-26 RX ADMIN — FERROUS SULFATE TAB 325 MG (65 MG ELEMENTAL FE) 325 MG: 325 (65 FE) TAB at 09:42

## 2021-01-26 RX ADMIN — METOPROLOL TARTRATE 25 MG: 25 TABLET, FILM COATED ORAL at 09:51

## 2021-01-26 RX ADMIN — IPRATROPIUM BROMIDE AND ALBUTEROL SULFATE 1 AMPULE: 2.5; .5 SOLUTION RESPIRATORY (INHALATION) at 16:29

## 2021-01-26 RX ADMIN — PIPERACILLIN AND TAZOBACTAM 3375 MG: 3; .375 INJECTION, POWDER, LYOPHILIZED, FOR SOLUTION INTRAVENOUS at 21:50

## 2021-01-26 RX ADMIN — GABAPENTIN 300 MG: 300 CAPSULE ORAL at 21:51

## 2021-01-26 RX ADMIN — INSULIN LISPRO 3 UNITS: 100 INJECTION, SOLUTION INTRAVENOUS; SUBCUTANEOUS at 16:56

## 2021-01-26 RX ADMIN — MUPIROCIN: 20 OINTMENT TOPICAL at 09:46

## 2021-01-26 RX ADMIN — Medication 10 ML: at 21:52

## 2021-01-26 RX ADMIN — INSULIN LISPRO 9 UNITS: 100 INJECTION, SOLUTION INTRAVENOUS; SUBCUTANEOUS at 12:02

## 2021-01-26 RX ADMIN — METOPROLOL TARTRATE 25 MG: 25 TABLET, FILM COATED ORAL at 21:57

## 2021-01-26 RX ADMIN — POLYETHYLENE GLYCOL 3350 17 G: 17 POWDER, FOR SOLUTION ORAL at 09:45

## 2021-01-26 RX ADMIN — BETAMETHASONE DIPROPIONATE: 0.5 CREAM TOPICAL at 09:46

## 2021-01-26 RX ADMIN — INSULIN LISPRO 6 UNITS: 100 INJECTION, SOLUTION INTRAVENOUS; SUBCUTANEOUS at 09:44

## 2021-01-26 RX ADMIN — BISACODYL 5 MG: 5 TABLET, COATED ORAL at 09:42

## 2021-01-26 RX ADMIN — AMIODARONE HYDROCHLORIDE 200 MG: 200 TABLET ORAL at 09:42

## 2021-01-26 RX ADMIN — ASPIRIN 81 MG: 81 TABLET, COATED ORAL at 09:42

## 2021-01-26 RX ADMIN — DOCUSATE SODIUM 50 MG AND SENNOSIDES 8.6 MG 1 TABLET: 8.6; 5 TABLET, FILM COATED ORAL at 21:51

## 2021-01-26 RX ADMIN — TAMSULOSIN HYDROCHLORIDE 0.4 MG: 0.4 CAPSULE ORAL at 09:42

## 2021-01-26 RX ADMIN — ENOXAPARIN SODIUM 40 MG: 40 INJECTION SUBCUTANEOUS at 09:44

## 2021-01-26 RX ADMIN — AMIODARONE HYDROCHLORIDE 200 MG: 200 TABLET ORAL at 21:51

## 2021-01-26 RX ADMIN — PANTOPRAZOLE SODIUM 40 MG: 40 TABLET, DELAYED RELEASE ORAL at 09:42

## 2021-01-26 RX ADMIN — INSULIN LISPRO 7 UNITS: 100 INJECTION, SOLUTION INTRAVENOUS; SUBCUTANEOUS at 21:54

## 2021-01-26 RX ADMIN — DOCUSATE SODIUM 50 MG AND SENNOSIDES 8.6 MG 1 TABLET: 8.6; 5 TABLET, FILM COATED ORAL at 09:42

## 2021-01-26 RX ADMIN — HYDROCODONE BITARTRATE AND ACETAMINOPHEN 2 TABLET: 5; 325 TABLET ORAL at 09:52

## 2021-01-26 RX ADMIN — BETAMETHASONE DIPROPIONATE: 0.5 CREAM TOPICAL at 21:51

## 2021-01-26 RX ADMIN — OXYCODONE HYDROCHLORIDE AND ACETAMINOPHEN 500 MG: 500 TABLET ORAL at 21:51

## 2021-01-26 RX ADMIN — MAGNESIUM GLUCONATE 500 MG ORAL TABLET 400 MG: 500 TABLET ORAL at 09:42

## 2021-01-26 RX ADMIN — Medication 10 ML: at 08:24

## 2021-01-26 RX ADMIN — MAGNESIUM HYDROXIDE 30 ML: 2400 SUSPENSION ORAL at 09:52

## 2021-01-26 RX ADMIN — MUPIROCIN: 20 OINTMENT TOPICAL at 21:51

## 2021-01-26 RX ADMIN — GABAPENTIN 300 MG: 300 CAPSULE ORAL at 09:42

## 2021-01-26 RX ADMIN — FERROUS SULFATE TAB 325 MG (65 MG ELEMENTAL FE) 325 MG: 325 (65 FE) TAB at 16:55

## 2021-01-26 RX ADMIN — PIPERACILLIN AND TAZOBACTAM 3375 MG: 3; .375 INJECTION, POWDER, LYOPHILIZED, FOR SOLUTION INTRAVENOUS at 05:30

## 2021-01-26 RX ADMIN — PIPERACILLIN AND TAZOBACTAM 3375 MG: 3; .375 INJECTION, POWDER, LYOPHILIZED, FOR SOLUTION INTRAVENOUS at 13:24

## 2021-01-26 RX ADMIN — OXYCODONE HYDROCHLORIDE AND ACETAMINOPHEN 500 MG: 500 TABLET ORAL at 09:42

## 2021-01-26 RX ADMIN — FOLIC ACID 1 MG: 1 TABLET ORAL at 09:42

## 2021-01-26 RX ADMIN — ONDANSETRON 4 MG: 2 INJECTION INTRAMUSCULAR; INTRAVENOUS at 08:24

## 2021-01-26 ASSESSMENT — PAIN SCALES - GENERAL
PAINLEVEL_OUTOF10: 0
PAINLEVEL_OUTOF10: 0

## 2021-01-26 NOTE — ANESTHESIA POSTPROCEDURE EVALUATION
Department of Anesthesiology  Postprocedure Note    Patient: Latonia Valadez  MRN: [id number]  YOB: 1940  Date of evaluation: 1/26/2021  Time:  7:32 AM     Procedure Summary     Date: 01/22/21 Room / Location: [facility] 01 / [facility]    Anesthesia Start: 4097 Anesthesia Stop: 2417    Procedure: CORONARY ARTERY BYPASS, ELISEO -- WANTS 0700 (N/A ) Diagnosis: (.)    Surgeons: Amelia Paulino DO Responsible Provider: Lisbeth Hardy MD    Anesthesia Type: general ASA Status: 4          Anesthesia Type: general    Lorna Phase I: Lorna Score: 6    Lorna Phase II:      Last vitals: Reviewed and per EMR flowsheets.        Anesthesia Post Evaluation    Patient location during evaluation: floor  Patient participation: complete - patient participated  Level of consciousness: awake and alert  Airway patency: patent  Nausea & Vomiting: no nausea and no vomiting  Complications: no  Cardiovascular status: hemodynamically stable and blood pressure returned to baseline  Respiratory status: acceptable  Hydration status: euvolemic

## 2021-01-26 NOTE — PROGRESS NOTES
Patient unable to void on his own after straight cath. White re-inserted per order and 400 mL of clear dark urine retrieved.

## 2021-01-26 NOTE — CARE COORDINATION
SOCIAL WORK/CASEMANAGEMENT TRANSITION OF CARE [id number] [place]  [facility] [address], [place], [phone]): pt accepted to [facility] for mamie. precert is not needed. They are covid free right now. Left rapid covid test in soft chart need within 7 days of discharge. All discharge paperwork with exempt and the protocol from [facility] in envelope to go with pt. Pt is in agreement as well. Jake Tsang  1/26/2021

## 2021-01-26 NOTE — PROGRESS NOTES
POD#4  Awake, alert. Complains today he feels \"worse than ever\". Denies CP, palpitations, SOB at rest.  Vomited once this am while drinking water. Denies nausea at present    White reinserted overnight    Vitals:    01/26/21 0055 01/26/21 0115 01/26/21 0530 01/26/21 0757   BP: 118/68  130/60 (!) 144/64   Pulse: 104  94 107   Resp: 18  16 18   Temp: 98.1 °F (36.7 °C)  97.2 °F (36.2 °C) 97.1 °F (36.2 °C)   TempSrc: Oral  Temporal Temporal   SpO2:  94% 96% 95%   Weight:   195 lb 1.6 oz (88.5 kg)    Height:         O2: 3 L/NC      Intake/Output Summary (Last 24 hours) at 1/26/2021 0828  Last data filed at 1/26/2021 0530  Gross per 24 hour   Intake 360 ml   Output 1050 ml   Net -690 ml         +BM yesterday reported by nsg after suppository but not documented        Recent Labs     01/24/21  0902 01/25/21  0437 01/26/21  0609   WBC 15.3* 11.9* 9.6   HGB 8.3* 8.0* 7.7*   HCT 26.0* 25.1* 24.5*    144 157      Recent Labs     01/24/21  0902 01/25/21  0437 01/26/21  0609   BUN 41* 43* 33*   CREATININE 1.2 1.0 1.0         PE  Cardiac: RRR  Lungs: decreased bases bilat, upper airway mucus congestion  Chest incision with intact ETHEL DSD. Sternum stable. Prior chest tube site incisions C/D/I, no erythema with intact sutures. Epicardial pacing wires present and secure. Abd: Soft, tender with palpation throughout  Ext: bilat LE incisions c/d/i- thigh ecchoymosis mild, stable           A/P: POD# 4    1. CAD  --Stable s/p CABG x3 (LIMA-LAD, SVG-Ramus, SVG-RCA) on 1/22/2021  --Post op ELISEO reveals normal biventricular function  and no valvular abnormalities  --Scr stable  --DAPT/statin/BB  --reinforce sternal precautions  --continue epicardial pacing wires  --all chest tubes out  --PICC ordered over weekend but never placed? ???  PICC today        2. Acute blood loss anemia secondary to open heart surgery  --hgb 7.7- transfuse one unit today     3. ST  --change metop to 25mg BID  --decrease amio given nausea        4.

## 2021-01-26 NOTE — PROGRESS NOTES
[place]  Department of Internal Medicine  Division of Pulmonary, Critical Care and Sleep Medicine  Progress Note  Lane Chavez DO, Love Pavon, [address] [place] [address], MD, [facility]    Patient: Greer Chavarria  MRN: [id number]  : 1940    Encounter Time: 3:43 PM     Date of Admission: 1/15/2021 11:51 AM    Primary Care Physician: WILVER Jenkins NP    Reason for Consultation: Pre Op assesssment     SUBJECTIVE:  On 2021 underwent 1. Coronary artery bypass grafting x3 using left internal mammary artery to the left anterior descending artery, reverse saphenous vein graft to the ramus intermedius, and reverse saphenous vein graft to the distal right coronary artery. 2.  Lower extremity endoscopic vein harvest. 3.  Rigid sternal fixation with a KLS plating system. 4.  22 modifier. Thick tan with blood secretions  Clinically dry  Fatique noted  On 2 liters        OBJECTIVE:     PHYSICAL EXAM:   VITALS:   Vitals:    21 0530 21 0757 21 1100 21 1513   BP: 130/60 (!) 144/64 (!) 112/59 (!) 94/52   Pulse: 94 107 99 83   Resp: 16 18 18 18   Temp: 97.2 °F (36.2 °C) 97.1 °F (36.2 °C) 97.5 °F (36.4 °C) 98 °F (36.7 °C)   TempSrc: Temporal Temporal Temporal Temporal   SpO2: 96% 95% 98% 94%   Weight: 195 lb 1.6 oz (88.5 kg)      Height:            Intake/Output Summary (Last 24 hours) at 2021 1543  Last data filed at 2021 1459  Gross per 24 hour   Intake 120 ml   Output 850 ml   Net -730 ml        CONSTITUTIONAL:   Alert, NAD  SKIN:     No rash, Skin discolorationm fair skin with AK  HEENT:     EOMI, MMM, No thrush  NECK:    No bruits, No JVP apprechiated  CV:      Sinus,  No gallops  PULMONARY:   Couse BS,  No Wheezing, No Rales, No Rhonchi      No noted egophony  ABDOMEN:     Soft, non-tender. BS normal. No R/R/G  EXT:    No deformities . No clubbing. Trace lower extremity edema,   PULSE:   Palpable.   PSYCHIATRIC:  Seems brachiocephalic trunk and left common carotid artery at thoracic aortic arch. No mediastinal fluid collections. No mediastinal or hilar lymphadenopathy. There are no airspace opacities. There is a loculated right pleural effusion with adjacent subsegmental atelectasis. No pneumothorax. View of the upper abdomen shows normal bilateral adrenal glands. 1.  Loculated right pleural effusion with adjacent subsegmental atelectasis. 2.  No evidence of pneumonia. 3.  Atherosclerotic calcifications associated with the coronary arteries. Us Carotid Artery Bilateral  Result Date: 1/17/2021  Atherosclerotic disease. No hemodynamically significant stenosis is identified Estimated stenosis by NASCET criteria in the proximal right carotid artery is between 0% and 49%. Estimated stenosis by NASCET criteria in the proximal left carotid artery is between 0% and 49%. Assessment: Alexei Lima is a 24-year-old gentleman seen for preoperative assessment with abnormal pulmonary assessment revealing severe restrictive physiologic defect with loss and diffusion capacity. The undergo bypass surgery in the next few days and we were asked to comment on preoperative risk. On 1/22/2021 underwent 1. Coronary artery bypass grafting x3 using left internal mammary artery to the left anterior descending artery, reverse saphenous vein graft to the ramus intermedius, and reverse saphenous vein graft to the distal right coronary artery. 2.  Lower extremity endoscopic vein harvest. 3.  Rigid sternal fixation with a KLS plating system. 4.  22 modifier. 1. Restrictive physiology  2. MV CAD  3. Chronic pain  4. Post operative decondition  5. Anemai post op - transfusion  6. Sputum mixes  7. HLD  8. HTN  9. Loculated pleural effusion    Plan:   1. Continue with bronchodilators  2. Try to obtain the sputum samples -   3. Start Zosyn given the image of sputum - appears mixed spit so will likely change to Ceftin in am  4.  PEP valve  5. Encourage ICS and ambuliation  6. Oxygen support keep >90%  7. CPAP at night   8. ICS @ 2 hours to prevent atelectasis  9.  Continue PT       Yuly Duenas DO, MPH, [facility], Homer Brito

## 2021-01-26 NOTE — PROGRESS NOTES
Vascular Access Procedure Note    Procedure Date:   1/26/2021    Pre-procedure Verification/Time-Out:  The proposed risks versus benefits of this procedure were discussed in detail by the physician with patient. written consent was obtained from the patient. Relevant documentation was reviewed prior to procedure including signed consent form and medications. All necessary equipment for procedure is available at time of procedure yes. An audible time out was done at 1340PM by team members, correctly identifying patients name, medical record number, correct side, correct site, and correct procedure to be performed with registered nurse members of the procedure team all in agreement.         Indication for Procedure:   Reason for Insertion: intravenous access    ASA Assessment (Required for Moderate & Deep Sedation):      Procedure:   Reason for Consultation: power PICC line    Clinician Performing Procedure:   Brody Vizcarra rn    Assistant:  none    Sedation:   Analgesia Used: lidocaine 1%    Procedure Details/Findings:  Catheter Czech Size: 5 fr dl  Lot Number: [id number]  Product #: [id number]  Expiration Date: 01/31/2022  Maximum Barrier Precautions: cap, eye shield, full body drape, gloves, gown, handwashing and mask  Skin Prep: chlorhexidine  Technique: modified seldinger and ultrasound guided with VPS  Attempts: 1  Exposed (cm): 0  Total (cm): 40  Placement Site: left brachial vein  Vessel Size: 0.55  Dressing: securement device, transparent dressing and biopatch  Blood Return: Yes   Ultrasound Guidance: Yes   Arm Circumference Mid-Bicep (cm): 30  Chest X-Ray Ordered: VasoNova VPS  End Placement: caj    Complications:   none     Post-operative Condition:  stable  Patient Tolerated Procedure: well     Comments/Post-operative Education:   Post Procedure Interventions: no blood pressure sign placed above bed    Swapna Sacramento  01/26/21  1:15 PM

## 2021-01-26 NOTE — PROGRESS NOTES
[facility] CARDIOLOGY PROGRESS NOTE  [facility]        Subjective:  Non-ST elevation myocardial infarction on presentation, postoperative day #4 status post CABG, DM, chronic hypertension. Awakens to his name and denies any significant shortness of breath or distress currently. Ate okay without nausea or vomiting. Objective: Medications, lab chart and telemetry are reviewed. Patient Vitals for the past 24 hrs:   BP Temp Temp src Pulse Resp SpO2 Weight   01/26/21 1100 (!) 112/59 97.5 °F (36.4 °C) Temporal 99 18 98 % --   01/26/21 0757 (!) 144/64 97.1 °F (36.2 °C) Temporal 107 18 95 % --   01/26/21 0530 130/60 97.2 °F (36.2 °C) Temporal 94 16 96 % 195 lb 1.6 oz (88.5 kg)   01/26/21 0115 -- -- -- -- -- 94 % --   01/26/21 0055 118/68 98.1 °F (36.7 °C) Oral 104 18 -- --   01/25/21 2055 94/65 97.5 °F (36.4 °C) Axillary 97 12 93 % --   01/25/21 1459 115/61 97.6 °F (36.4 °C) Temporal 100 16 98 % --         Intake/Output Summary (Last 24 hours) at 1/26/2021 1347  Last data filed at 1/26/2021 0530  Gross per 24 hour   Intake 240 ml   Output 1050 ml   Net -810 ml       Wt Readings from Last 3 Encounters:   01/26/21 195 lb 1.6 oz (88.5 kg)       Telemetry: I personally reviewed and shows normal sinus rhythm heart rate in the 90s.   Current meds: Scheduled Meds:   amiodarone  200 mg Oral BID    metoprolol tartrate  25 mg Oral BID    piperacillin-tazobactam  3,375 mg Intravenous Q8H    enoxaparin  40 mg Subcutaneous Daily    polyethylene glycol  17 g Oral Daily    aspirin  81 mg Oral Daily    bisacodyl  5 mg Oral Daily    sennosides-docusate sodium  1 tablet Oral BID    magnesium hydroxide  30 mL Oral Daily    ferrous sulfate  325 mg Oral BID WC    vitamin C  500 mg Oral BID    folic acid  1 mg Oral Daily    bisacodyl  10 mg Rectal Daily    insulin lispro  0-18 Units Subcutaneous TID WC    insulin lispro  0-9 Units Subcutaneous Nightly    sodium chloride flush  10 mL Intravenous BID    ipratropium-albuterol  1 ampule Inhalation 4x daily    docosanol   Topical 5x Daily    rosuvastatin  20 mg Oral Nightly    magnesium oxide  400 mg Oral Daily    mupirocin   Nasal BID    pantoprazole  40 mg Oral Daily    clopidogrel  75 mg Oral Daily    tamsulosin  0.4 mg Oral Daily    budesonide  0.5 mg Nebulization BID    betamethasone dipropionate   Topical BID    gabapentin  300 mg Oral BID     Continuous Infusions:   sodium chloride       PRN Meds:.sodium chloride, acetaminophen, HYDROcodone 5 mg - acetaminophen **OR** HYDROcodone 5 mg - acetaminophen, potassium chloride, ondansetron, diphenhydrAMINE, benzocaine-menthol    Allergies: Shrimp (diagnostic) and Valium [diazepam]      Labs:   Recent Labs     01/24/21  0902 01/25/21 0437 01/26/21  0609   WBC 15.3* 11.9* 9.6   HGB 8.3* 8.0* 7.7*   HCT 26.0* 25.1* 24.5*   MCV 91.5 92.6 93.9    144 157       Labs:   Recent Labs     01/24/21  0902 01/25/21 0437 01/26/21  0609    137 136   K 4.5 4.3 4.4   CL 96* 97* 96*   CO2 25 33* 35*   BUN 41* 43* 33*   CREATININE 1.2 1.0 1.0       Labs: No results for input(s): CKTOTAL, CKMB, CKMBINDEX, TROPONINI in the last 72 hours. Labs: No results for input(s): BNP in the last 72 hours. Labs: No results for input(s): CHOL, HDL, TRIG in the last 72 hours. Invalid input(s): Esteban Raw    Labs:   Recent Labs     01/26/21  1000   PROT 5.5*       Review of systems: No reported significant weight gain or weight loss. no dysuria or frequency, no dizziness, falls or trauma, no change in bowel or bladder habits, no hematochezia, hemoptysis or hematuria. No fevers, chills, nausea or vomiting reported. No significant wheezing or sputum production. No headache or visual changes. The remainder of the 10 review of systems otherwise negative. Exam      General: Patient comfortable in no distress and currently denies any chest pain.     HEENT: Face symmetrical and no apparent cranial

## 2021-01-26 NOTE — PATIENT CARE CONFERENCE
P Quality Flow/Interdisciplinary Rounds Progress Note        Quality Flow Rounds held on January 26, 2021    Disciplines Attending:  Bedside Nurse, ,  and Nursing Unit Anastasiya Martinez was admitted on 1/15/2021 11:51 AM    Anticipated Discharge Date:  Expected Discharge Date: 01/29/21    Disposition:    Denny Score:  Denny Scale Score: 18    Readmission Risk              Risk of Unplanned Readmission:        14           Discussed patient goal for the day, patient clinical progression, and barriers to discharge.   The following Goal(s) of the Day/Commitment(s) have been identified:  to get a picc for iv excess      Briana Stephen  January 26, 2021

## 2021-01-26 NOTE — PROGRESS NOTES
Douglas Whittaker sent message through [employer] to see patient on consult for Dr. Janett Patrick.

## 2021-01-26 NOTE — DISCHARGE INSTR - COC
Continuity of Care Form    Patient Name: Steffen Blake   :  1940  MRN:  [id number]    Admit date:  1/15/2021  Discharge date:  21    Code Status Order: Prior   Advance Directives:   [address] Directive Type of Healthcare Directive Copy in [address] Agent's Name Healthcare Agent's Phone Number    21 0502  Yes, patient has an advance directive for healthcare treatment  Living will  No, copy requested from family  Healthcare power of   Efrain Reddy  [phone] daughter    01/15/21 1230  Yes, patient has an advance directive for healthcare treatment  Living will  --  --  --  --    01/15/21 0715  Yes, patient has an advance directive for healthcare treatment  Living will  No, copy requested from family  --  --  --          Admitting Physician:  Denver Cates DO  PCP: WILVER Alvarado NP    Discharging Nurse: [facility] Unit/Room#: 8712/4700-A  Discharging Unit Phone Number: [phone]    Emergency Contact:   Extended Emergency Contact Information  Primary Emergency Contact: richard christiansen  Home Phone: [phone]  Relation: Child  Secondary Emergency Contact: william soto  Home Phone: [phone]  Relation: Child    Past Surgical History:  Past Surgical History:   Procedure Laterality Date    BACK SURGERY      CHOLECYSTECTOMY      CORONARY ARTERY BYPASS GRAFT N/A 2021    CORONARY ARTERY BYPASS, ELISEO -- WANTS 0700 performed by Denver Cates DO at [address] [place] [address] ENDOSCOPY N/A 2021    EGD DIAGNOSTIC ONLY performed by Maria Eugenia Chaudhry MD at [facility] History:   Immunization History   Administered Date(s) Administered    Influenza Virus Vaccine 2018    Influenza, Quadv, adjuvanted, 65 yrs +, IM, PF (Fluad) 2020    Influenza, Triv, inactivated, subunit, adjuvanted, IM (Fluad 65 yrs and older) 10/08/2019    Pneumococcal Conjugate 13-valent ([id number]) 07/17/2020    Pneumococcal Polysaccharide ([id number]) 02/10/2016    Zoster Recombinant (Shingrix) 07/17/2020       Active Problems:  Patient Active Problem List   Diagnosis Code    Stable angina pectoris (HCC) I20.8    COPD (chronic obstructive pulmonary disease) (HCC) J44.9    HTN (hypertension) I10    Type 2 diabetes mellitus (HCC) E11.9    HLD (hyperlipidemia) E78.5    CAD (coronary artery disease) I25.10    Preop cardiovascular exam Z01.810       Isolation/Infection:   Isolation            No Isolation          Patient Infection Status     Infection Onset Added Last Indicated Last Indicated By Review Planned Expiration Resolved Resolved By    None active    Resolved    COVID-19 Rule Out 01/30/21 01/30/21 01/30/21 COVID-19 (Ordered)   01/30/21 Rule-Out Test Resulted    COVID-19 Rule Out 01/12/21 01/12/21 01/12/21 Covid-19 Ambulatory (Ordered)   01/13/21 Rule-Out Test Resulted          Nurse Assessment:  Last Vital Signs: BP (!) 115/59   Pulse 87   Temp 98.4 °F (36.9 °C) (Infrared)   Resp 16   Ht 5' 7\" (1.702 m)   Wt 192 lb 3.2 oz (87.2 kg)   SpO2 91%   BMI 30.10 kg/m²     Last documented pain score (0-10 scale): Pain Level: 7  Last Weight:   Wt Readings from Last 1 Encounters:   02/01/21 192 lb 3.2 oz (87.2 kg)     Mental Status:  oriented    IV Access: none    Nursing Mobility/ADLs:  Walking   Assisted  Transfer  Assisted  Bathing  Assisted  Dressing  Assisted  Toileting  Assisted  Feeding  Assisted  Med Admin assisted  Med Delivery   whole    Wound Care Documentation and Therapy:        Elimination:  Continence:   · Bowel:  Yes  · Bladder: Yes  Urinary Catheter: insertion date 1/26/21 may remove when ambulating more  Colostomy/Ileostomy/Ileal Conduit: No       Date of Last BM: 1/31/21    Intake/Output Summary (Last 24 hours) at 2/1/2021 1122  Last data filed at 2/1/2021 1025  Gross per 24 hour   Intake 740 ml   Output 675 ml Net 65 ml     I/O last 3 completed shifts: In: 18 [P.O.:560; I.V.:10]  Out: [address]    Safety Concerns:      At Risk for Falls    Impairments/Disabilities:      None    Nutrition Therapy:  Current Nutrition Therapy:   - Oral Diet:  Carb Control 4 carbs/meal (1800kcals/day)    Routes of Feeding: Oral  Liquids: No Restrictions  Daily Fluid Restriction: no  Last Modified Barium Swallow with Video (Video Swallowing Test): not done    Treatments at the Time of Hospital Discharge:   Respiratory Treatments: see MAR  Oxygen Therapy:  Room air  Ventilator:    - BiPAP    , CPAP/EPAP: 5 cmH2O only when sleeping    Rehab Therapies: PT and OT to eval and treat   Weight Bearing Status/Restrictions: No weight bearing restirctions  Other Medical Equipment (for information only, NOT a DME order):  none  Other Treatments:  please follow the cardiac protocol enclosed!!!    Patient's personal belongings (please select all that are sent with patient):  shoes shirts, pants, dentures, uppers and lowers and cell phone and cell phone      RN SIGNATURE:  {Esignature:[id number]}    CASE MANAGEMENT/SOCIAL WORK SECTION    Inpatient Status Date: ***    Readmission Risk Assessment Score:  Readmission Risk              Risk of Unplanned Readmission:        14           Discharging to Facility/ Agency   · Name:  [facility] in [place]   · Address:  · Phone:  · Fax:    Dialysis Facility (if applicable)   · Name:  · Address:  · Dialysis Schedule:  · Phone:  · Fax:    Electronically signed by MARIUM Coker     PHYSICIAN SECTION    Prognosis: {Prognosis:[id number]}    Condition at Discharge: Ginny Hand Patient Condition:[id number]}    Rehab Potential (if transferring to Rehab): {Prognosis:[id number]}    Recommended Labs or Other Treatments After Discharge: ***    Physician Certification: I certify the above information and transfer of Carolyn Amanda  is necessary for the continuing treatment of the diagnosis listed and that he requires Rich Doss for less 30 days.      Update Admission H&P: Changes in H&P as follows - see todays progress note    PHYSICIAN SIGNATURE:  Electronically signed by SAMUEL Cannon on 1/30/21 at 8:52 AM EST

## 2021-01-27 LAB
ANION GAP SERPL CALCULATED.3IONS-SCNC: 4 MMOL/L (ref 7–16)
BUN BLDV-MCNC: 28 MG/DL (ref 8–23)
CALCIUM SERPL-MCNC: 7.5 MG/DL (ref 8.6–10.2)
CHLORIDE BLD-SCNC: 96 MMOL/L (ref 98–107)
CO2: 35 MMOL/L (ref 22–29)
CREAT SERPL-MCNC: 1.1 MG/DL (ref 0.7–1.2)
CULTURE, RESPIRATORY: ABNORMAL
CULTURE, RESPIRATORY: ABNORMAL
GFR AFRICAN AMERICAN: >60
GFR NON-AFRICAN AMERICAN: >60 ML/MIN/1.73
GLUCOSE BLD-MCNC: 208 MG/DL (ref 74–99)
HCT VFR BLD CALC: 25.6 % (ref 37–54)
HEMOGLOBIN: 8.2 G/DL (ref 12.5–16.5)
MCH RBC QN AUTO: 30 PG (ref 26–35)
MCHC RBC AUTO-ENTMCNC: 32 % (ref 32–34.5)
MCV RBC AUTO: 93.8 FL (ref 80–99.9)
METER GLUCOSE: 163 MG/DL (ref 74–99)
METER GLUCOSE: 174 MG/DL (ref 74–99)
METER GLUCOSE: 228 MG/DL (ref 74–99)
METER GLUCOSE: 240 MG/DL (ref 74–99)
ORGANISM: ABNORMAL
PDW BLD-RTO: 15.7 FL (ref 11.5–15)
PLATELET # BLD: 153 E9/L (ref 130–450)
PMV BLD AUTO: 10 FL (ref 7–12)
POTASSIUM SERPL-SCNC: 4.2 MMOL/L (ref 3.5–5)
RBC # BLD: 2.73 E12/L (ref 3.8–5.8)
SMEAR, RESPIRATORY: ABNORMAL
SODIUM BLD-SCNC: 135 MMOL/L (ref 132–146)
WBC # BLD: 8.9 E9/L (ref 4.5–11.5)

## 2021-01-27 PROCEDURE — 94640 AIRWAY INHALATION TREATMENT: CPT

## 2021-01-27 PROCEDURE — 6370000000 HC RX 637 (ALT 250 FOR IP): Performed by: NURSE PRACTITIONER

## 2021-01-27 PROCEDURE — 97530 THERAPEUTIC ACTIVITIES: CPT

## 2021-01-27 PROCEDURE — 6370000000 HC RX 637 (ALT 250 FOR IP): Performed by: INTERNAL MEDICINE

## 2021-01-27 PROCEDURE — 93798 PHYS/QHP OP CAR RHAB W/ECG: CPT

## 2021-01-27 PROCEDURE — 99232 SBSQ HOSP IP/OBS MODERATE 35: CPT | Performed by: INTERNAL MEDICINE

## 2021-01-27 PROCEDURE — 82962 GLUCOSE BLOOD TEST: CPT

## 2021-01-27 PROCEDURE — 97535 SELF CARE MNGMENT TRAINING: CPT

## 2021-01-27 PROCEDURE — 2140000000 HC CCU INTERMEDIATE R&B

## 2021-01-27 PROCEDURE — 6370000000 HC RX 637 (ALT 250 FOR IP): Performed by: THORACIC SURGERY (CARDIOTHORACIC VASCULAR SURGERY)

## 2021-01-27 PROCEDURE — 94660 CPAP INITIATION&MGMT: CPT

## 2021-01-27 PROCEDURE — 6360000002 HC RX W HCPCS: Performed by: NURSE PRACTITIONER

## 2021-01-27 PROCEDURE — 6360000002 HC RX W HCPCS: Performed by: INTERNAL MEDICINE

## 2021-01-27 PROCEDURE — 2580000003 HC RX 258: Performed by: INTERNAL MEDICINE

## 2021-01-27 PROCEDURE — 80048 BASIC METABOLIC PNL TOTAL CA: CPT

## 2021-01-27 PROCEDURE — 2700000000 HC OXYGEN THERAPY PER DAY

## 2021-01-27 PROCEDURE — 99232 SBSQ HOSP IP/OBS MODERATE 35: CPT | Performed by: SURGERY

## 2021-01-27 PROCEDURE — 6370000000 HC RX 637 (ALT 250 FOR IP): Performed by: PHYSICIAN ASSISTANT

## 2021-01-27 PROCEDURE — 85027 COMPLETE CBC AUTOMATED: CPT

## 2021-01-27 RX ADMIN — BUDESONIDE 500 MCG: 0.5 SUSPENSION RESPIRATORY (INHALATION) at 08:40

## 2021-01-27 RX ADMIN — GABAPENTIN 300 MG: 300 CAPSULE ORAL at 08:54

## 2021-01-27 RX ADMIN — BETAMETHASONE DIPROPIONATE: 0.5 CREAM TOPICAL at 08:56

## 2021-01-27 RX ADMIN — GABAPENTIN 300 MG: 300 CAPSULE ORAL at 21:26

## 2021-01-27 RX ADMIN — BETAMETHASONE DIPROPIONATE: 0.5 CREAM TOPICAL at 21:24

## 2021-01-27 RX ADMIN — MAGNESIUM GLUCONATE 500 MG ORAL TABLET 400 MG: 500 TABLET ORAL at 08:55

## 2021-01-27 RX ADMIN — INSULIN LISPRO 6 UNITS: 100 INJECTION, SOLUTION INTRAVENOUS; SUBCUTANEOUS at 08:58

## 2021-01-27 RX ADMIN — METOPROLOL TARTRATE 25 MG: 25 TABLET, FILM COATED ORAL at 21:26

## 2021-01-27 RX ADMIN — OXYCODONE HYDROCHLORIDE AND ACETAMINOPHEN 500 MG: 500 TABLET ORAL at 08:54

## 2021-01-27 RX ADMIN — AMIODARONE HYDROCHLORIDE 200 MG: 200 TABLET ORAL at 21:26

## 2021-01-27 RX ADMIN — FOLIC ACID 1 MG: 1 TABLET ORAL at 08:55

## 2021-01-27 RX ADMIN — TAMSULOSIN HYDROCHLORIDE 0.4 MG: 0.4 CAPSULE ORAL at 08:54

## 2021-01-27 RX ADMIN — FERROUS SULFATE TAB 325 MG (65 MG ELEMENTAL FE) 325 MG: 325 (65 FE) TAB at 08:54

## 2021-01-27 RX ADMIN — BUDESONIDE 500 MCG: 0.5 SUSPENSION RESPIRATORY (INHALATION) at 20:18

## 2021-01-27 RX ADMIN — INSULIN LISPRO 2 UNITS: 100 INJECTION, SOLUTION INTRAVENOUS; SUBCUTANEOUS at 21:24

## 2021-01-27 RX ADMIN — PANTOPRAZOLE SODIUM 40 MG: 40 TABLET, DELAYED RELEASE ORAL at 08:54

## 2021-01-27 RX ADMIN — NYSTATIN 500000 UNITS: 100000 SUSPENSION ORAL at 17:05

## 2021-01-27 RX ADMIN — DOCUSATE SODIUM 50 MG AND SENNOSIDES 8.6 MG 1 TABLET: 8.6; 5 TABLET, FILM COATED ORAL at 08:54

## 2021-01-27 RX ADMIN — ASPIRIN 81 MG: 81 TABLET, COATED ORAL at 08:55

## 2021-01-27 RX ADMIN — ACETAMINOPHEN 650 MG: 325 TABLET, FILM COATED ORAL at 21:26

## 2021-01-27 RX ADMIN — ONDANSETRON 4 MG: 2 INJECTION INTRAMUSCULAR; INTRAVENOUS at 09:32

## 2021-01-27 RX ADMIN — MAGNESIUM HYDROXIDE 30 ML: 2400 SUSPENSION ORAL at 08:53

## 2021-01-27 RX ADMIN — INSULIN LISPRO 3 UNITS: 100 INJECTION, SOLUTION INTRAVENOUS; SUBCUTANEOUS at 16:25

## 2021-01-27 RX ADMIN — IPRATROPIUM BROMIDE AND ALBUTEROL SULFATE 1 AMPULE: 2.5; .5 SOLUTION RESPIRATORY (INHALATION) at 20:18

## 2021-01-27 RX ADMIN — OXYCODONE HYDROCHLORIDE AND ACETAMINOPHEN 500 MG: 500 TABLET ORAL at 21:26

## 2021-01-27 RX ADMIN — FERROUS SULFATE TAB 325 MG (65 MG ELEMENTAL FE) 325 MG: 325 (65 FE) TAB at 16:24

## 2021-01-27 RX ADMIN — Medication 10 ML: at 08:54

## 2021-01-27 RX ADMIN — ENOXAPARIN SODIUM 40 MG: 40 INJECTION SUBCUTANEOUS at 08:53

## 2021-01-27 RX ADMIN — DOCUSATE SODIUM 50 MG AND SENNOSIDES 8.6 MG 1 TABLET: 8.6; 5 TABLET, FILM COATED ORAL at 21:26

## 2021-01-27 RX ADMIN — BISACODYL 5 MG: 5 TABLET, COATED ORAL at 08:55

## 2021-01-27 RX ADMIN — POLYETHYLENE GLYCOL 3350 17 G: 17 POWDER, FOR SOLUTION ORAL at 08:55

## 2021-01-27 RX ADMIN — Medication 10 ML: at 21:24

## 2021-01-27 RX ADMIN — NYSTATIN 500000 UNITS: 100000 SUSPENSION ORAL at 13:11

## 2021-01-27 RX ADMIN — NYSTATIN 500000 UNITS: 100000 SUSPENSION ORAL at 21:25

## 2021-01-27 RX ADMIN — PIPERACILLIN AND TAZOBACTAM 3375 MG: 3; .375 INJECTION, POWDER, LYOPHILIZED, FOR SOLUTION INTRAVENOUS at 21:19

## 2021-01-27 RX ADMIN — AMIODARONE HYDROCHLORIDE 200 MG: 200 TABLET ORAL at 08:54

## 2021-01-27 RX ADMIN — PIPERACILLIN AND TAZOBACTAM 3375 MG: 3; .375 INJECTION, POWDER, LYOPHILIZED, FOR SOLUTION INTRAVENOUS at 13:11

## 2021-01-27 RX ADMIN — ROSUVASTATIN 20 MG: 20 TABLET, FILM COATED ORAL at 21:26

## 2021-01-27 RX ADMIN — ONDANSETRON 4 MG: 2 INJECTION INTRAMUSCULAR; INTRAVENOUS at 21:24

## 2021-01-27 RX ADMIN — PIPERACILLIN AND TAZOBACTAM 3375 MG: 3; .375 INJECTION, POWDER, LYOPHILIZED, FOR SOLUTION INTRAVENOUS at 05:35

## 2021-01-27 RX ADMIN — CLOPIDOGREL 75 MG: 75 TABLET, FILM COATED ORAL at 08:54

## 2021-01-27 RX ADMIN — IPRATROPIUM BROMIDE AND ALBUTEROL SULFATE 1 AMPULE: 2.5; .5 SOLUTION RESPIRATORY (INHALATION) at 08:41

## 2021-01-27 RX ADMIN — INSULIN LISPRO 6 UNITS: 100 INJECTION, SOLUTION INTRAVENOUS; SUBCUTANEOUS at 11:11

## 2021-01-27 ASSESSMENT — PAIN SCALES - GENERAL
PAINLEVEL_OUTOF10: 0

## 2021-01-27 ASSESSMENT — PAIN DESCRIPTION - PAIN TYPE: TYPE: SURGICAL PAIN

## 2021-01-27 NOTE — PROGRESS NOTES
[facility] CARDIOLOGY PROGRESS NOTE  [facility]        Subjective: Non-ST elevation myocardial infarction admission, subsequently underwent CABG on January 22, 2021, DM, hypertension chronic pain. Slow to move and still appears to be somewhat uncomfortable with limited activity. He reports he ate well and still having a fair amount of chest symptoms  Labs chart medications and telemetry all reviewed. Objective: Telemetry with normal sinus rhythm    Patient Vitals for the past 24 hrs:   BP Temp Temp src Pulse Resp SpO2 Weight   01/27/21 0845 -- -- -- -- -- (!) 89 % --   01/27/21 0815 (!) 93/59 98.7 °F (37.1 °C) Oral 89 18 93 % --   01/27/21 0730 -- -- -- -- -- 95 % --   01/27/21 0520 -- -- -- -- -- -- 201 lb (91.2 kg)   01/27/21 0415 (!) 95/53 97.4 °F (36.3 °C) Temporal 81 20 93 % --   01/27/21 0011 (!) 106/59 97.6 °F (36.4 °C) Temporal 77 20 92 % --   01/26/21 2150 122/63 -- -- 88 -- -- --   01/26/21 2033 (!) 92/54 97.7 °F (36.5 °C) Temporal 83 20 92 % --   01/26/21 1553 (!) 107/55 96.8 °F (36 °C) -- 74 20 96 % --   01/26/21 1513 (!) 94/52 98 °F (36.7 °C) Temporal 83 18 94 % --         Intake/Output Summary (Last 24 hours) at 1/27/2021 1149  Last data filed at 1/27/2021 0416  Gross per 24 hour   Intake 120 ml   Output 800 ml   Net -680 ml       Wt Readings from Last 3 Encounters:   01/27/21 201 lb (91.2 kg)       Telemetry: In normal sinus rhythm heart rate in the 80s.     Current meds: Scheduled Meds:   nystatin  5 mL Oral 4x Daily    amiodarone  200 mg Oral BID    metoprolol tartrate  25 mg Oral BID    piperacillin-tazobactam  3,375 mg Intravenous Q8H    enoxaparin  40 mg Subcutaneous Daily    polyethylene glycol  17 g Oral Daily    aspirin  81 mg Oral Daily    bisacodyl  5 mg Oral Daily    sennosides-docusate sodium  1 tablet Oral BID    magnesium hydroxide  30 mL Oral Daily    ferrous sulfate  325 mg Oral BID WC    vitamin C  500 mg Oral BID    folic acid  1 mg Oral Daily    bisacodyl 10 mg Rectal Daily    insulin lispro  0-18 Units Subcutaneous TID     insulin lispro  0-9 Units Subcutaneous Nightly    sodium chloride flush  10 mL Intravenous BID    ipratropium-albuterol  1 ampule Inhalation 4x daily    docosanol   Topical 5x Daily    rosuvastatin  20 mg Oral Nightly    magnesium oxide  400 mg Oral Daily    pantoprazole  40 mg Oral Daily    clopidogrel  75 mg Oral Daily    tamsulosin  0.4 mg Oral Daily    budesonide  0.5 mg Nebulization BID    betamethasone dipropionate   Topical BID    gabapentin  300 mg Oral BID     Continuous Infusions:   sodium chloride       PRN Meds:.sodium chloride, acetaminophen, HYDROcodone 5 mg - acetaminophen **OR** HYDROcodone 5 mg - acetaminophen, potassium chloride, ondansetron, diphenhydrAMINE, benzocaine-menthol    Allergies: Shrimp (diagnostic) and Valium [diazepam]      Labs:   Recent Labs     01/25/21  0437 01/26/21  0609 01/26/21  2048 01/27/21  0553   WBC 11.9* 9.6  --  8.9   HGB 8.0* 7.7* 8.4* 8.2*   HCT 25.1* 24.5* 25.7* 25.6*   MCV 92.6 93.9  --  93.8    157  --  153       Labs:   Recent Labs     01/25/21  0437 01/26/21  0609 01/27/21  0553    136 135   K 4.3 4.4 4.2   CL 97* 96* 96*   CO2 33* 35* 35*   BUN 43* 33* 28*   CREATININE 1.0 1.0 1.1       Labs: No results for input(s): CKTOTAL, CKMB, CKMBINDEX, TROPONINI in the last 72 hours. Labs: No results for input(s): BNP in the last 72 hours. Labs: No results for input(s): CHOL, HDL, TRIG in the last 72 hours. Invalid input(s): Mina Schwartz    Labs:   Recent Labs     01/26/21  1000   PROT 5.5*       Review of systems: No reported significant weight gain or weight loss. no dysuria or frequency, no dizziness, falls or trauma, no change in bowel or bladder habits, no hematochezia, hemoptysis or hematuria. No fevers, chills, nausea or vomiting reported. No significant wheezing or sputum production. No headache or visual changes.   The remainder of the 10 review of systems otherwise negative. Exam      General: Patient comfortable in no distress and currently denies any chest pain. HEENT: Face symmetrical and no apparent cranial nerve deficit. Neck: No jugular venous distention, carotid bruit or thyromegaly. Lungs: Clear bilaterally without rales, wheezes or dullness. Cardiac: Regular rate and rhythm, no S3, S4, no rub or gallop. Abdomen: No rebound or guarding, no hepatosplenomegaly. Extremities: Without significant clubbing , cyanosis, or edema. Neuro:  No focal motor or sensory deficit apparent. Skin: No petechiae, no significant bruising. Assessment: See plan below      Plan: #1 CAD status post CABG on January 22, 2021. Slow to recover and suspect fair amount of discomfort as he is afraid to cough. I encouraged him to take pain medication as needed. Take it before he gets too uncomfortable. Mobilize to chair and advance activity as tolerated. #2 hypertension and following up blood pressure. #3 diabetes and diabetic education reviewed. #4 anemia and today hemoglobin 8.2 was 7.7 yesterday. #5 hypoxia and continue current pulmonary support. Continue to utilize incentive spirometer when awake and nasal cannula oxygen supplement.         Electronically signed by Emmanuel Luis MD on 1/27/2021 at 11:49 AM

## 2021-01-27 NOTE — CARE COORDINATION
SOCIAL WORK/CASEMANAGEMENT TRANSITION OF CARE [id number] Jannette Hassan, [address], [place], [phone]): daughter, Petty Luke, called and now wants [facility]. I called katiuska from [facility]( [phone])  and left vm to return call today. We have a bed at [facility] and discharge paperwork is in place with exempt. Guillermina Argue  1/27/2021

## 2021-01-27 NOTE — PROGRESS NOTES
General Surgery Progress Note:  Patient seen and examined, agree with resident note, for remaining HP/Consult details please see resident HP/Consult note. CC: n/v     S: pt denies any more n/v this morning, has some upper abdominal discomfort. Objective:  @BP (!) 111/55   Pulse 87   Temp 97.1 °F (36.2 °C) (Temporal)   Resp 18   Ht 5' 7\" (1.702 m)   Wt 201 lb (91.2 kg)   SpO2 99%   BMI 31.48 kg/m² @    Physical -     Gen: NAD  Resp: Breathing Comfortably, no resp distress fairly clear some wheezing b/l   CV: Reg Rate no extra heart sounds,   Abd: soft mild upper abd tenderness    EXT NVI,     Assessment/Plan: N/V s/p CABG    - abd soft no pain, lactate ok,   - KUB looked ok, reports exist that he moved his bowels. ..   - bowel reg,        Se Muniz MD FACS     12:29 PM

## 2021-01-27 NOTE — PROGRESS NOTES
OT BEDSIDE TREATMENT NOTE      Date:2021  Patient Name: Toby Butler  MRN: [id number]  : 1940  Room: [address] [place] [address]     Per OT Eval:    Evaluating OT: Susanne GagnonOSVALDO, OTR/L [id number]     AM-PAC Daily Activity Raw Score:   Recommended Adaptive Equipment:  TBD      Diagnosis: NSTEMI   Referring Practitioner: WILVER Gonzalez CNP  Surgery:  CABG x3   Pertinent Medical History: HTN, DM, COPD, CA, CAD, HLD, Covid (2020)   Precautions:  Falls, O2, sternal, drain     Home Living: Pt lives with son in a 1 story home with step(s) to enter; bed/bath on main level   Bathroom setup: walk in shower   Equipment owned: reacher, cane, monty  Prior Level of Function: IND with ADLs , assist with IADLs; using no AD for functional mobility   Driving: no     Pain Level: Pt complained of L stomach, pain, feeling nausea, stating feeling \"horrible\"  Cognition: A&O: 3/4; Follows 1 step directions, with repetition and increased time              Memory:  fair               Sequencing:  fair               Problem solving:  fair               Judgement/safety:  fair      Functional Assessment:    Initial Eval Status  Date: 21 Treatment Status  Date: 21 Short Term Goals  Treatment frequency: 2-4 x/week   Feeding SUP   Supervision  Pt able to grasp cup and bring to mouth, drinking from straw IND   Grooming Min A (pt completed facil washing seated EOB with assist for balance)  SBA  Pt washed face, applied deodorant seated upright in chair SUP   UB Dressing Mod A   maxA  [facility] gown seated upright in chair Min A   LB Dressing Dep (assist with B socks)  Dependent  [facility] socks seated upright in chair Mod A    Bathing Max A (simulated)  maxA  Simulated Task    Pt denying this session Min A    Toileting Dep (assist with jose hygiene and bed pan) Dependent  Pt having of cota catheter  Mod A   Bed Mobility  Log roll: Max A  Supine to sit: Max A   Sit to supine:  Mod A   DNT  maxA per previous level    Pt seated upright in chair upon arrival and at end of session Log roll Min A  Supine to sit: Min A   Sit to supine: Min A   Functional Transfers Sit to stand: Max A   Stand to sit: Max A  Commode: TBA  maxA  Sit to Stand  Stand to Sit    Cueing for hand placement to follow of sternal precautions Min A   Functional Mobility NT (due to line management, urgency with toileting task, and RN report of pt's hypotension)  modAx2  Pt ambulated short distance in room with seated rest break, having of poor safety/balance, R lean, with 3rd person to assist with lines and chair follow for safety Min A   Balance Sitting: Min A (posterior lean)  Standing: Max A  Sitting Supported:  SBA    Standing:  maxA     Activity Tolerance Fair- (SpO2>95% during activity)  Poor+  Pt feeling nausea, stating of feeling horrible multiple times throughout session when asked, poor activity tolerance, agitated throughout session     Visual/  Perceptual Glasses: yes                               Hand dominance: L  UE ROM:        BUE: elbow flex WFL, shoulder flex 90'  Strength:        BUE: grossly observed 3/5        Strength: B WFL  Fine Motor Coordination:  fair     Hearing: WFL  Sensation:  No c/o numbness/tingling   Tone:  WFL  Edema: BLEs      Education:  Pt was educated through out treatment regarding importance of therapy, goals to be reached, importance of OOB activity, precautions to follow, proper technique & safety with functional transfers & functional mobility to improve safety & prevent falls and allow pt to return home safely. Comments: Upon arrival pt seated upright in chair, agreeable to therapy after encouragement, with [facility]ia rehab present to assist with mobility. Spoke with nursing, okaying pt to be seen, stating of giving pt nausea medication shortly before session, stating of pt needing to get up and engage in activity.  Pt having of poor activity tolerance, requiring rest breaks and cueing on deep breathing techniques, monitoring of O2 stats throughout. At end of session, pt seated upright in chair, all lines and tubes intact, call light within reach. · Pt has made limited progress towards set goals.    · Continue with current plan of care focusing on increasing of independency with transfers and ADL tasks      Treatment Time In: 10:43am            Treatment Time Out: 11:06am              Treatment Charges: Mins Units   Ther Ex  [id number]     Manual Therapy [id number]     Thera Activities 79380 15 1   ADL/Home Mgt 39033 8 1   Neuro Re-ed [id number]     Group Therapy      Orthotic manage/training  [id number]     Non-Billable Time     Total Timed Treatment 23 2        Yudy Quintana SANTOS/L [id number]

## 2021-01-27 NOTE — PLAN OF CARE
Problem: Falls - Risk of:  Goal: Will remain free from falls  Description: Will remain free from falls  Outcome: Met This Shift  Goal: Absence of physical injury  Description: Absence of physical injury  Outcome: Met This Shift     Problem: Infection - Surgical Site:  Goal: Will show no infection signs and symptoms  Description: Will show no infection signs and symptoms  Outcome: Met This Shift     Problem: Discharge Planning:  Goal: Discharged to appropriate level of care  Description: Discharged to appropriate level of care  Outcome: Met This Shift     Problem:  Activity Intolerance:  Goal: Able to perform prescribed physical activity  Description: Able to perform prescribed physical activity  Outcome: Met This Shift  Goal: Ability to tolerate increased activity will improve  Description: Ability to tolerate increased activity will improve  Outcome: Met This Shift     Problem: Anxiety:  Goal: Level of anxiety will decrease  Description: Level of anxiety will decrease  Outcome: Met This Shift     Problem: Cardiac Output - Decreased:  Goal: Cardiac output within specified parameters  Description: Cardiac output within specified parameters  Outcome: Met This Shift  Goal: Hemodynamic stability will improve  Description: Hemodynamic stability will improve  Outcome: Met This Shift     Problem: Fluid Volume - Imbalance:  Goal: Ability to achieve a balanced intake and output will improve  Description: Ability to achieve a balanced intake and output will improve  Outcome: Met This Shift  Goal: Chest tube drainage is within specified parameters  Description: Chest tube drainage is within specified parameters  Outcome: Met This Shift     Problem: Gas Exchange - Impaired:  Goal: Levels of oxygenation will improve  Description: Levels of oxygenation will improve  Outcome: Met This Shift  Goal: Ability to maintain adequate ventilation will improve  Description: Ability to maintain adequate ventilation will improve  Outcome: Met This Shift     Problem: Pain:  Goal: Pain level will decrease  Description: Pain level will decrease  Outcome: Met This Shift  Goal: Control of acute pain  Description: Control of acute pain  Outcome: Met This Shift  Goal: Control of chronic pain  Description: Control of chronic pain  Outcome: Met This Shift     Problem: Tissue Perfusion - Cardiopulmonary, Altered:  Goal: Absence of angina  Description: Absence of angina  Outcome: Met This Shift  Goal: Hemodynamic stability will improve  Description: Hemodynamic stability will improve  Outcome: Met This Shift  Goal: Will show no evidence of cardiac arrhythmias  Description: Will show no evidence of cardiac arrhythmias  Outcome: Met This Shift     Problem: Tobacco Use:  Goal: Will participate in inpatient tobacco-use cessation counseling  Description: Will participate in inpatient tobacco-use cessation counseling  Outcome: Met This Shift     Problem: Skin Integrity:  Goal: Will show no infection signs and symptoms  Description: Will show no infection signs and symptoms  Outcome: Met This Shift  Goal: Absence of new skin breakdown  Description: Absence of new skin breakdown  Outcome: Met This Shift     Problem: Pain:  Goal: Pain level will decrease  Description: Pain level will decrease  Outcome: Met This Shift  Goal: Control of acute pain  Description: Control of acute pain  Outcome: Met This Shift  Goal: Control of chronic pain  Description: Control of chronic pain  Outcome: Met This Shift

## 2021-01-27 NOTE — CONSULTS
GENERAL SURGERY  CONSULT NOTE  1/26/2021    Physician Consulted: Dr. Seo Estimable  Reason for Consult: Post-op nausea, abdominal pain      KATIANA Flores is a 68 y.o. male who is 4 days status post CABG x3 for NSTEMI. History of DM and HTN. Patient was very somnolent and not very willing to participate answering questions. Was able to answer few questions. He states that he does not currently have nausea. He believes he had a bowel movement within the last day. In the medical record it is noted that the patient vomited while drinking water this morning. KUB of the abdomen was performed today without evidence of obstruction or ileus. Past Medical History:   Diagnosis Date    Arthritis     CAD (coronary artery disease) 1/16/2021    [facility]     COPD [facility]ronic obstructive pulmonary disease) [facility]     Diabetes mellitus ([facility]     HTN (hypertension) 1/16/2021    Hx of blood clots     Hyperlipidemia        Past Surgical History:   Procedure Laterality Date    BACK SURGERY  1995    CHOLECYSTECTOMY  2003    CORONARY ARTERY BYPASS GRAFT N/A 1/22/2021    CORONARY ARTERY BYPASS, ELISEO -- WANTS 0700 performed by Lena Dixon DO at [address]         Medications Prior to Admission:    Prior to Admission medications    Medication Sig Start Date End Date Taking?  Authorizing Provider   omeprazole (PRILOSEC) 20 MG delayed release capsule Take 20 mg by mouth daily   Yes Historical Provider, MD   MELATONIN PO Take by mouth   Yes Historical Provider, MD   celecoxib (CELEBREX) 200 MG capsule Take 1 capsule by mouth daily 9/9/20  Yes Cezar Lino APRN - NP   glipiZIDE (GLUCOTROL) 5 MG tablet Take 1 tablet by mouth 2 times daily (before meals) 9/9/20  Yes Cezar Lino APRN - NP   metoprolol succinate (TOPROL XL) 25 MG extended release tablet Take 1 tablet by mouth 2 times daily 9/9/20  Yes WILVER Landon NP   gabapentin (NEURONTIN) 300 MG capsule Take 1 capsule by mouth 2 times daily for 30 days. 9/9/20 10/9/20  Howard Fenton, APRN - NP       Allergies   Allergen Reactions    Shrimp (Diagnostic)     Valium [Diazepam]        Family History   Problem Relation Age of Onset    Diabetes Mother        Social History     Tobacco Use    Smoking status: Former Smoker    Smokeless tobacco: Never Used   Substance Use Topics    Alcohol use: Not on file    Drug use: Not on file         Review of Systems   Patient unwilling to answer questions      PHYSICAL EXAM:    Vitals:    01/26/21 1553   BP: (!) 107/55   Pulse: 74   Resp: 20   Temp: 96.8 °F (36 °C)   SpO2: 96%       General Appearance:  awake, alert, in no acute distress  Skin:  Skin color, texture, turgor normal. No rashes or lesions. Head/face:  NCAT  Eyes:  No gross abnormalities. Lungs:  Breathing Pattern: rapid, shallow  Heart:  Regular rate  Abdomen: Soft, moderately distended. No guarding or rigidity. Mildly tender to palpation diffusely  Extremities: Extremities warm to touch    LABS:    CBC  Recent Labs     01/26/21  0609   WBC 9.6   HGB 7.7*   HCT 24.5*        BMP  Recent Labs     01/26/21  0609      K 4.4   CL 96*   CO2 35*   BUN 33*   CREATININE 1.0   CALCIUM 8.2*     Liver Function  Recent Labs     01/26/21  1000   AMYLASE 104*   LIPASE 36   BILITOT 0.7   BILIDIR 0.3   AST 39   ALT 17   ALKPHOS 104   PROT 5.5*   LABALBU 3.3*     No results for input(s): LACTATE in the last 72 hours. No results for input(s): INR, PTT in the last 72 hours.     Invalid input(s): PT    RADIOLOGY    Echo Complete    Result Date: 1/17/2021  Transthoracic Echocardiography Report (TTE)  Demographics   Patient Name       Moustapha Rodrigues  Gender              Male                     R   Medical Record     [id number]         Room Number         2405  Number   Account #          [de-identified]        Procedure Date      01/16/2021   Corporate ID                        Ordering Physician  Sony Viveros MD   Accession Number   [id number]       Referring Physician Isis Barraza MD   Date of Birth      1940       Sonographer         Sunshine Horn LILLY   Age                68 year(s)       Interpreting        The [address] [place] [address]                                      Physician           Isis Barraza MD                                       Any Other  Procedure Type of Study   TTE procedure:Echo Complete W/Doppler & Color Flow. Procedure Date Date: 01/16/2021 Start: 09:28 AM Study Location: Portable Technical Quality: Adequate visualization Indications:LV function. Patient Status: Routine Height: 67 inches Weight: 195 pounds BSA: 2 m^2 BMI: 30.54 kg/m^2  Findings   Left Ventricle  Left ventricle is normal in size. Normal left ventricular wall thickness. Low normal ejection fraction. Ejection fraction is visually estimated at 55%. There is doppler evidence of stage I diastolic dysfunction. Right Ventricle  Mildly dilated right ventricle. Right ventricle global systolic function is normal.   Left Atrium  Normal sized left atrium. Interatrial septum appears intact. Right Atrium  Normal right atrium size. Mitral Valve  Structurally normal mitral valve. No evidence of mitral valve stenosis. Mild mitral regurgitation is present. Tricuspid Valve  The tricuspid valve appears structurally normal.  Physiologic and/or trace tricuspid regurgitation. Pulmonary hypertension is not present. Aortic Valve  Individual aortic valve leaflets are not clearly visualized. No hemodynamically significant aortic stenosis is present. Mild aortic regurgitation is noted. Pulmonic Valve  The pulmonic valve was not well visualized. No evidence of pulmonic valve stenosis. No evidence of any pulmonic regurgitation. Pericardial Effusion  No evidence of pericardial effusion. Pleural Effusion  No evidence of pleural effusion. Aorta  Aortic root dimension within normal limits.    Conclusions   Signature 0.06 m/s  MV E' Lateral  Velocity: 10 m/s  [email or web address]    Ct Chest Wo Contrast    Result Date: 1/16/2021  EXAMINATION: CT OF THE CHEST WITHOUT CONTRAST 1/16/2021 11:06 am TECHNIQUE: CT of the chest was performed without the administration of intravenous contrast. Multiplanar reformatted images are provided for review. Dose modulation, iterative reconstruction, and/or weight based adjustment of the mA/kV was utilized to reduce the radiation dose to as low as reasonably achievable. COMPARISON: None. HISTORY: ORDERING SYSTEM PROVIDED HISTORY: pre-op evaluation TECHNOLOGIST PROVIDED HISTORY: Reason for exam:->pre-op evaluation What reading provider will be dictating this exam?->CRC FINDINGS: The heart is normal in size. Atherosclerotic calcifications are associated with the coronary arteries. No pericardial effusion. Ascending thoracic aorta measures 3.2 cm in diameter. Descending thoracic aorta measures 2.4 cm in diameter. There is a combined origin of brachiocephalic trunk and left common carotid artery at thoracic aortic arch. No mediastinal fluid collections. No mediastinal or hilar lymphadenopathy. There are no airspace opacities. There is a loculated right pleural effusion with adjacent subsegmental atelectasis. No pneumothorax. View of the upper abdomen shows normal bilateral adrenal glands. 1.  Loculated right pleural effusion with adjacent subsegmental atelectasis. 2.  No evidence of pneumonia. 3.  Atherosclerotic calcifications associated with the coronary arteries.         ASSESSMENT:  68 y.o. male with postop nausea, status post CABG x3 on 1/22 for NSTEMI    PLAN:    -Patient's nausea appears to have resolved  -KUB without signs of ileus or obstruction at this time  -Monitor abdominal exam    Electronically signed by Abdoulaye Norris DO on 1/26/21 at 7:01 PM EST

## 2021-01-27 NOTE — PROGRESS NOTES
POD#5 Awake, alert. Complains of just being tired. Denies CP, palpitations, SOB at rest, dizziness/lightheadedness. Up in the chair. states nausea was better this morning but now patient has vomited again    Vitals:    01/27/21 0520 01/27/21 0730 01/27/21 0815 01/27/21 0845   BP:   (!) 93/59    Pulse:   89    Resp:   18    Temp:   98.7 °F (37.1 °C)    TempSrc:   Oral    SpO2:  95% 93% (!) 89%   Weight: 201 lb (91.2 kg)      Height:         O2: 4L/NC      Intake/Output Summary (Last 24 hours) at 1/27/2021 0854  Last data filed at 1/27/2021 0416  Gross per 24 hour   Intake 120 ml   Output 800 ml   Net -680 ml         +BM yesterday reported by nsg after suppository but not documented    UO: 200mL/8hr cota catheter in place       Recent Labs     01/25/21  0437 01/26/21  0609 01/26/21  2048 01/27/21  0553   WBC 11.9* 9.6  --  8.9   HGB 8.0* 7.7* 8.4* 8.2*   HCT 25.1* 24.5* 25.7* 25.6*    157  --  153      Recent Labs     01/25/21  0437 01/26/21  0609 01/27/21  0553   BUN 43* 33* 28*   CREATININE 1.0 1.0 1.1       Telemetry: NSR      PE  Cardiac: RRR  Lungs: decreased bases bilat, upper airway mucus congestion  Chest incision with intact ETHEL DSD. Sternum stable. Prior chest tube site incisions C/D/I, no erythema with intact sutures. Epicardial pacing wires present and secure. Abd: Soft, tender with palpation throughout  Ext: bilat LE incisions c/d/i- thigh ecchoymosis mild, stable               A/P: POD# 5     1.  CAD  --Stable s/p CABG x3 (LIMA-LAD, SVG-Ramus, SVG-RCA) on 1/22/2021  --Post op ELISEO reveals normal biventricular function and no valvular abnormalities  --Scr stable 1.1  --DAPT/statin/BB  --reinforce sternal precautions  --continue epicardial pacing wires  --all chest tubes out  --PICC placed yesterday        2. Acute blood loss anemia secondary to open heart surgery  --hgb 7.7 yesterday; 1 unit PRBC given  --Hgb 8.2 today       3. ST  --change metop to 25mg BID  --amio decreased yesterday given

## 2021-01-27 NOTE — PLAN OF CARE
Problem: Falls - Risk of:  Goal: Will remain free from falls  Description: Will remain free from falls  1/27/2021 1141 by Gerardo Lang RN  Outcome: Met This Shift  1/27/2021 0207 by Iman Arita RN  Outcome: Met This Shift     Problem: Infection - Surgical Site:  Goal: Will show no infection signs and symptoms  Description: Will show no infection signs and symptoms  1/27/2021 1141 by Gerardo Lang RN  Outcome: Met This Shift  1/27/2021 0207 by Iman Arita RN  Outcome: Met This Shift     Problem:  Activity Intolerance:  Goal: Able to perform prescribed physical activity  Description: Able to perform prescribed physical activity  1/27/2021 1141 by Gerardo Lang RN  Outcome: Met This Shift  1/27/2021 0207 by Iman Arita RN  Outcome: Met This Shift     Problem: Gas Exchange - Impaired:  Goal: Levels of oxygenation will improve  Description: Levels of oxygenation will improve  1/27/2021 1141 by Gerardo Lang RN  Outcome: Met This Shift  1/27/2021 0207 by Iman Arita RN  Outcome: Met This Shift     Problem: Pain:  Goal: Pain level will decrease  Description: Pain level will decrease  1/27/2021 1141 by Gerardo Lang RN  Outcome: Met This Shift  1/27/2021 0207 by Iman Arita RN  Outcome: Met This Shift     Problem: Skin Integrity:  Goal: Will show no infection signs and symptoms  Description: Will show no infection signs and symptoms  1/27/2021 1141 by Gerardo Lang RN  Outcome: Met This Shift  1/27/2021 0207 by Iman Arita RN  Outcome: Met This Shift     Problem: Pain:  Goal: Pain level will decrease  Description: Pain level will decrease  1/27/2021 1141 by Gerardo Lang RN  Outcome: Met This Shift  1/27/2021 0207 by Iman Arita RN  Outcome: Met This Shift     Problem: Cardiac Output - Decreased:  Goal: Cardiac output within specified parameters  Description: Cardiac output within specified parameters  1/27/2021 1141 by Gerardo Lang RN  Outcome: Ongoing  1/27/2021 0207 by Se Carver Maureen Orona, RN  Outcome: Met This Shift

## 2021-01-27 NOTE — PROGRESS NOTES
Query created by: Ramone Mena on 1/25/2021 12:00 PM      Electronically signed by:  Violette Neil DO 1/27/2021 7:50 AM

## 2021-01-27 NOTE — PROGRESS NOTES
[place]  Department of Internal Medicine  Division of Pulmonary, Critical Care and Sleep Medicine  Progress Note  Nesha Alas DO, [facility], [facility], [facility]  Kathrin Hunter MD, [facility]    Patient: Myrna Engel  MRN: [id number]  : 1940    Encounter Time: 11:41 AM     Date of Admission: 1/15/2021 11:51 AM    Primary Care Physician: WILVER Ramirez - GLEN    Reason for Consultation: Pre Op assesssment     SUBJECTIVE:    Thick tan with blood secretions improved  Still seems lith less   Now with abd pain        OBJECTIVE:     PHYSICAL EXAM:   VITALS:   Vitals:    21 0520 21 0730 21 0815 21 0845   BP:   (!) 93/59    Pulse:   89    Resp:   18    Temp:   98.7 °F (37.1 °C)    TempSrc:   Oral    SpO2:  95% 93% (!) 89%   Weight: 201 lb (91.2 kg)      Height:            Intake/Output Summary (Last 24 hours) at 2021 1141  Last data filed at 2021 0416  Gross per 24 hour   Intake 120 ml   Output 800 ml   Net -680 ml        CONSTITUTIONAL:   Alert, NAD  SKIN:     No rash, Skin discolorationm fair skin with AK  HEENT:     EOMI, MMM, No thrush  NECK:    No bruits, No JVP apprechiated  CV:      Sinus,  No gallops  PULMONARY:   Couse BS,  No Wheezing, No Rales, No Rhonchi      No noted egophony  ABDOMEN:     Soft, non-tender. BS normal. No R/R/G  EXT:    No deformities . No clubbing. Trace lower extremity edema,   PULSE:   Palpable.   PSYCHIATRIC:  Seems appropriate, No acute psycosis  MS:    No fractures, No gross weakness  NEUROLOGIC:   Non-focal     DATA: IMAGING & TESTING:     LABORATORY TESTS:    CBC:   Lab Results   Component Value Date    WBC 8.9 2021    RBC 2.73 2021    HGB 8.2 2021    HCT 25.6 2021    MCV 93.8 2021    MCH 30.0 2021    MCHC 32.0 2021    RDW 15.7 2021     2021    MPV 10.0 2021     CMP:    Lab Results   Component Value Date     2021    K 4.2 01/27/2021    K 4.3 01/16/2021    CL 96 01/27/2021    CO2 35 01/27/2021    BUN 28 01/27/2021    CREATININE 1.1 01/27/2021    GFRAA >60 01/27/2021    LABGLOM >60 01/27/2021    GLUCOSE 208 01/27/2021    PROT 5.5 01/26/2021    LABALBU 3.3 01/26/2021    CALCIUM 7.5 01/27/2021    BILITOT 0.7 01/26/2021    ALKPHOS 104 01/26/2021    AST 39 01/26/2021    ALT 17 01/26/2021     Calcium:    Lab Results   Component Value Date    CALCIUM 7.5 01/27/2021        PRO-BNP:   Lab Results   Component Value Date    PROBNP 4,111 (H) 01/24/2021      ABGs:   Lab Results   Component Value Date    PH 7.332 01/22/2021    PO2 65.7 01/22/2021    PCO2 40.9 01/22/2021     Hemoglobin A1C: No components found for: HGBA1C    IMAGING:  Imaging tests were completed and reviewed and discussed radiology and care team involved and reveals     CHEST RADIOGRAM            Pulmonary function test: Pulmonary function test revealed forced vital capacity of 1.4 at 6 liters, 41% of predicted with an FEV1 of 1.5 at 7 liters, 44% ofpredicted with an FEV1/FVC ratio of 80%. Midflow rates are 60% of predicted with maximum voluntary ventilation at 20 liters per minute, 88% of predicted.     Static lung volumes with total vital capacity of 1.48 liters, 41% of predicted. Inspiratory capacity is 1.36 liters, 45% of predicted. Expiratory reserve volume 0.13 liters, 73% of predicted. The diffusion capacity is 17.17 mL/min per mmHg, which is 25% of predicted. Severely restricted lung physiology with noted moderate-to-severe loss in gas transfer. Echo Complete  TTE procedure:Echo Complete W/Doppler & Color Flow. Procedure Date Date: 01/16/2021 Start: 09:28 AM Study Location: Portable Technical Quality: Adequate visualization Indications:LV function. Patient Status: Routine Height: 67 inches Weight: 195 pounds BSA: 2 m^2 BMI: 30.54 kg/m^2  Findings   Left Ventricle  Left ventricle is normal in size. Normal left ventricular wall thickness.   Low normal ejection fraction. Ejection fraction is visually estimated at 55%. There is doppler evidence of stage I diastolic dysfunction. Right Ventricle  Mildly dilated right ventricle. Right ventricle global systolic function is normal.   Left Atrium  Normal sized left atrium. Interatrial septum appears intact. Right Atrium  Normal right atrium size. Mitral Valve  Structurally normal mitral valve. No evidence of mitral valve stenosis. Mild mitral regurgitation is present. Tricuspid Valve  The tricuspid valve appears structurally normal.  Physiologic and/or trace tricuspid regurgitation. Pulmonary hypertension is not present. Aortic Valve  Individual aortic valve leaflets are not clearly visualized. No hemodynamically significant aortic stenosis is present. Mild aortic regurgitation is noted. Pulmonic Valve  The pulmonic valve was not well visualized. No evidence of pulmonic valve stenosis. No evidence of any pulmonic regurgitation. Pericardial Effusion  No evidence of pericardial effusion. Pleural Effusion  No evidence of pleural effusion. Aorta  Aortic root dimension within normal limits. Ct Chest Wo Contrast    Result Date: 1/16/2021  FINDINGS: The heart is normal in size. Atherosclerotic calcifications are associated with the coronary arteries. No pericardial effusion. Ascending thoracic aorta measures 3.2 cm in diameter. Descending thoracic aorta measures 2.4 cm in diameter. There is a combined origin of brachiocephalic trunk and left common carotid artery at thoracic aortic arch. No mediastinal fluid collections. No mediastinal or hilar lymphadenopathy. There are no airspace opacities. There is a loculated right pleural effusion with adjacent subsegmental atelectasis. No pneumothorax. View of the upper abdomen shows normal bilateral adrenal glands. 1.  Loculated right pleural effusion with adjacent subsegmental atelectasis. 2.  No evidence of pneumonia.  3.  Atherosclerotic calcifications associated with the coronary arteries. Us Carotid Artery Bilateral  Result Date: 1/17/2021  Atherosclerotic disease. No hemodynamically significant stenosis is identified Estimated stenosis by NASCET criteria in the proximal right carotid artery is between 0% and 49%. Estimated stenosis by NASCET criteria in the proximal left carotid artery is between 0% and 49%. Assessment: Huber Tello is a 37-year-old gentleman seen for preoperative assessment with abnormal pulmonary assessment revealing severe restrictive physiologic defect with loss and diffusion capacity. The undergo bypass surgery in the next few days and we were asked to comment on preoperative risk. On 1/22/2021 underwent 1. Coronary artery bypass grafting x3 using left internal mammary artery to the left anterior descending artery, reverse saphenous vein graft to the ramus intermedius, and reverse saphenous vein graft to the distal right coronary artery. 2.  Lower extremity endoscopic vein harvest. 3.  Rigid sternal fixation with a KLS plating system. 4.  22 modifier. 1. Restrictive lung physiology  2. Acute Respiratory failure    3. MV CAD  4. Chronic pain  5. Post operative decondition  6. Anemai post op - transfusion  7. Sputum mixes  8. HLD  9. HTN  10. Loculated pleural effusion    Plan:   1. Continue with bronchodilators  2. Try to obtain the sputum samples -   3. FOllow serial abd exams  4. Bowel prep noted  5. Start Zosyn given the image of sputum - appears mixed spit so will likely change to Ceftin in am  6. PEP valve  7. Encourage ICS and ambuliation  8. Oxygen support keep >90%  9. CPAP at night   10. ICS @ 2 hours to prevent atelectasis  11.  Continue PT       Marlene Yanez DO, MPH, FCCP, Ed Gomez

## 2021-01-28 LAB
ANION GAP SERPL CALCULATED.3IONS-SCNC: 8 MMOL/L (ref 7–16)
BLOOD BANK DISPENSE STATUS: NORMAL
BLOOD BANK PRODUCT CODE: NORMAL
BPU ID: NORMAL
BUN BLDV-MCNC: 21 MG/DL (ref 8–23)
CALCIUM SERPL-MCNC: 7.4 MG/DL (ref 8.6–10.2)
CHLORIDE BLD-SCNC: 96 MMOL/L (ref 98–107)
CO2: 30 MMOL/L (ref 22–29)
CREAT SERPL-MCNC: 0.9 MG/DL (ref 0.7–1.2)
DESCRIPTION BLOOD BANK: NORMAL
GFR AFRICAN AMERICAN: >60
GFR NON-AFRICAN AMERICAN: >60 ML/MIN/1.73
GLUCOSE BLD-MCNC: 150 MG/DL (ref 74–99)
HCT VFR BLD CALC: 25.5 % (ref 37–54)
HCT VFR BLD CALC: 28.4 % (ref 37–54)
HEMOGLOBIN: 7.8 G/DL (ref 12.5–16.5)
HEMOGLOBIN: 9.3 G/DL (ref 12.5–16.5)
MCH RBC QN AUTO: 29 PG (ref 26–35)
MCHC RBC AUTO-ENTMCNC: 30.6 % (ref 32–34.5)
MCV RBC AUTO: 94.8 FL (ref 80–99.9)
METER GLUCOSE: 131 MG/DL (ref 74–99)
METER GLUCOSE: 154 MG/DL (ref 74–99)
METER GLUCOSE: 177 MG/DL (ref 74–99)
METER GLUCOSE: 223 MG/DL (ref 74–99)
PDW BLD-RTO: 15.9 FL (ref 11.5–15)
PLATELET # BLD: 164 E9/L (ref 130–450)
PMV BLD AUTO: 9.7 FL (ref 7–12)
POTASSIUM SERPL-SCNC: 4.1 MMOL/L (ref 3.5–5)
RBC # BLD: 2.69 E12/L (ref 3.8–5.8)
SODIUM BLD-SCNC: 134 MMOL/L (ref 132–146)
WBC # BLD: 7.8 E9/L (ref 4.5–11.5)

## 2021-01-28 PROCEDURE — 94640 AIRWAY INHALATION TREATMENT: CPT

## 2021-01-28 PROCEDURE — 85014 HEMATOCRIT: CPT

## 2021-01-28 PROCEDURE — 6370000000 HC RX 637 (ALT 250 FOR IP): Performed by: THORACIC SURGERY (CARDIOTHORACIC VASCULAR SURGERY)

## 2021-01-28 PROCEDURE — 6360000002 HC RX W HCPCS: Performed by: INTERNAL MEDICINE

## 2021-01-28 PROCEDURE — 6370000000 HC RX 637 (ALT 250 FOR IP): Performed by: INTERNAL MEDICINE

## 2021-01-28 PROCEDURE — 6370000000 HC RX 637 (ALT 250 FOR IP): Performed by: NURSE PRACTITIONER

## 2021-01-28 PROCEDURE — 2140000000 HC CCU INTERMEDIATE R&B

## 2021-01-28 PROCEDURE — 6370000000 HC RX 637 (ALT 250 FOR IP): Performed by: PHYSICIAN ASSISTANT

## 2021-01-28 PROCEDURE — P9016 RBC LEUKOCYTES REDUCED: HCPCS

## 2021-01-28 PROCEDURE — 2700000000 HC OXYGEN THERAPY PER DAY

## 2021-01-28 PROCEDURE — 82962 GLUCOSE BLOOD TEST: CPT

## 2021-01-28 PROCEDURE — 2580000003 HC RX 258: Performed by: INTERNAL MEDICINE

## 2021-01-28 PROCEDURE — 94667 MNPJ CHEST WALL 1ST: CPT

## 2021-01-28 PROCEDURE — 36430 TRANSFUSION BLD/BLD COMPNT: CPT

## 2021-01-28 PROCEDURE — 99232 SBSQ HOSP IP/OBS MODERATE 35: CPT | Performed by: SURGERY

## 2021-01-28 PROCEDURE — 94660 CPAP INITIATION&MGMT: CPT

## 2021-01-28 PROCEDURE — 6360000002 HC RX W HCPCS: Performed by: NURSE PRACTITIONER

## 2021-01-28 PROCEDURE — 85018 HEMOGLOBIN: CPT

## 2021-01-28 PROCEDURE — 51700 IRRIGATION OF BLADDER: CPT

## 2021-01-28 PROCEDURE — 37799 UNLISTED PX VASCULAR SURGERY: CPT

## 2021-01-28 PROCEDURE — 36415 COLL VENOUS BLD VENIPUNCTURE: CPT

## 2021-01-28 PROCEDURE — 85027 COMPLETE CBC AUTOMATED: CPT

## 2021-01-28 PROCEDURE — 6360000002 HC RX W HCPCS: Performed by: PHYSICIAN ASSISTANT

## 2021-01-28 PROCEDURE — 99232 SBSQ HOSP IP/OBS MODERATE 35: CPT | Performed by: INTERNAL MEDICINE

## 2021-01-28 PROCEDURE — 93798 PHYS/QHP OP CAR RHAB W/ECG: CPT

## 2021-01-28 PROCEDURE — 80048 BASIC METABOLIC PNL TOTAL CA: CPT

## 2021-01-28 RX ORDER — SODIUM CHLORIDE 9 MG/ML
INJECTION, SOLUTION INTRAVENOUS PRN
Status: DISCONTINUED | OUTPATIENT
Start: 2021-01-28 | End: 2021-02-01 | Stop reason: HOSPADM

## 2021-01-28 RX ORDER — MINERAL OIL/HYDROPHIL PETROLAT
OINTMENT (GRAM) TOPICAL 2 TIMES DAILY
Status: DISCONTINUED | OUTPATIENT
Start: 2021-01-28 | End: 2021-02-01 | Stop reason: HOSPADM

## 2021-01-28 RX ORDER — MINERAL OIL/HYDROPHIL PETROLAT
OINTMENT (GRAM) TOPICAL 3 TIMES DAILY PRN
Status: DISCONTINUED | OUTPATIENT
Start: 2021-01-28 | End: 2021-02-01 | Stop reason: HOSPADM

## 2021-01-28 RX ORDER — DEXTROSE MONOHYDRATE 50 MG/ML
100 INJECTION, SOLUTION INTRAVENOUS PRN
Status: DISCONTINUED | OUTPATIENT
Start: 2021-01-28 | End: 2021-02-01 | Stop reason: HOSPADM

## 2021-01-28 RX ORDER — PETROLATUM 42 G/100G
OINTMENT TOPICAL 3 TIMES DAILY PRN
Status: DISCONTINUED | OUTPATIENT
Start: 2021-01-28 | End: 2021-01-28 | Stop reason: SDUPTHER

## 2021-01-28 RX ORDER — FUROSEMIDE 10 MG/ML
20 INJECTION INTRAMUSCULAR; INTRAVENOUS SEE ADMIN INSTRUCTIONS
Status: COMPLETED | OUTPATIENT
Start: 2021-01-28 | End: 2021-01-28

## 2021-01-28 RX ORDER — DEXTROSE MONOHYDRATE 25 G/50ML
12.5 INJECTION, SOLUTION INTRAVENOUS PRN
Status: DISCONTINUED | OUTPATIENT
Start: 2021-01-28 | End: 2021-02-01 | Stop reason: HOSPADM

## 2021-01-28 RX ORDER — NICOTINE POLACRILEX 4 MG
15 LOZENGE BUCCAL PRN
Status: DISCONTINUED | OUTPATIENT
Start: 2021-01-28 | End: 2021-02-01 | Stop reason: HOSPADM

## 2021-01-28 RX ORDER — PETROLATUM 42 G/100G
OINTMENT TOPICAL 2 TIMES DAILY
Status: DISCONTINUED | OUTPATIENT
Start: 2021-01-28 | End: 2021-01-28 | Stop reason: SDUPTHER

## 2021-01-28 RX ORDER — INSULIN GLARGINE 100 [IU]/ML
10 INJECTION, SOLUTION SUBCUTANEOUS NIGHTLY
Status: DISCONTINUED | OUTPATIENT
Start: 2021-01-28 | End: 2021-02-01 | Stop reason: HOSPADM

## 2021-01-28 RX ADMIN — OXYCODONE HYDROCHLORIDE AND ACETAMINOPHEN 500 MG: 500 TABLET ORAL at 22:27

## 2021-01-28 RX ADMIN — Medication 10 ML: at 22:36

## 2021-01-28 RX ADMIN — PANTOPRAZOLE SODIUM 40 MG: 40 TABLET, DELAYED RELEASE ORAL at 09:13

## 2021-01-28 RX ADMIN — BUDESONIDE 500 MCG: 0.5 SUSPENSION RESPIRATORY (INHALATION) at 13:07

## 2021-01-28 RX ADMIN — INSULIN LISPRO 3 UNITS: 100 INJECTION, SOLUTION INTRAVENOUS; SUBCUTANEOUS at 09:15

## 2021-01-28 RX ADMIN — INSULIN LISPRO 6 UNITS: 100 INJECTION, SOLUTION INTRAVENOUS; SUBCUTANEOUS at 11:17

## 2021-01-28 RX ADMIN — FOLIC ACID 1 MG: 1 TABLET ORAL at 09:14

## 2021-01-28 RX ADMIN — BUDESONIDE 500 MCG: 0.5 SUSPENSION RESPIRATORY (INHALATION) at 19:39

## 2021-01-28 RX ADMIN — GABAPENTIN 300 MG: 300 CAPSULE ORAL at 22:27

## 2021-01-28 RX ADMIN — CLOPIDOGREL 75 MG: 75 TABLET, FILM COATED ORAL at 09:14

## 2021-01-28 RX ADMIN — GABAPENTIN 300 MG: 300 CAPSULE ORAL at 09:13

## 2021-01-28 RX ADMIN — BETAMETHASONE DIPROPIONATE: 0.5 CREAM TOPICAL at 22:33

## 2021-01-28 RX ADMIN — NYSTATIN 500000 UNITS: 100000 SUSPENSION ORAL at 13:21

## 2021-01-28 RX ADMIN — METOPROLOL TARTRATE 25 MG: 25 TABLET, FILM COATED ORAL at 09:13

## 2021-01-28 RX ADMIN — PIPERACILLIN AND TAZOBACTAM 3375 MG: 3; .375 INJECTION, POWDER, LYOPHILIZED, FOR SOLUTION INTRAVENOUS at 22:35

## 2021-01-28 RX ADMIN — NYSTATIN 500000 UNITS: 100000 SUSPENSION ORAL at 22:39

## 2021-01-28 RX ADMIN — NYSTATIN 500000 UNITS: 100000 SUSPENSION ORAL at 09:13

## 2021-01-28 RX ADMIN — ASPIRIN 81 MG: 81 TABLET, COATED ORAL at 09:14

## 2021-01-28 RX ADMIN — METOPROLOL TARTRATE 25 MG: 25 TABLET, FILM COATED ORAL at 22:26

## 2021-01-28 RX ADMIN — PIPERACILLIN AND TAZOBACTAM 3375 MG: 3; .375 INJECTION, POWDER, LYOPHILIZED, FOR SOLUTION INTRAVENOUS at 04:41

## 2021-01-28 RX ADMIN — NYSTATIN 500000 UNITS: 100000 SUSPENSION ORAL at 16:05

## 2021-01-28 RX ADMIN — PIPERACILLIN AND TAZOBACTAM 3375 MG: 3; .375 INJECTION, POWDER, LYOPHILIZED, FOR SOLUTION INTRAVENOUS at 13:21

## 2021-01-28 RX ADMIN — ENOXAPARIN SODIUM 40 MG: 40 INJECTION SUBCUTANEOUS at 09:13

## 2021-01-28 RX ADMIN — AMIODARONE HYDROCHLORIDE 200 MG: 200 TABLET ORAL at 09:13

## 2021-01-28 RX ADMIN — OXYCODONE HYDROCHLORIDE AND ACETAMINOPHEN 500 MG: 500 TABLET ORAL at 09:14

## 2021-01-28 RX ADMIN — TAMSULOSIN HYDROCHLORIDE 0.4 MG: 0.4 CAPSULE ORAL at 09:13

## 2021-01-28 RX ADMIN — ROSUVASTATIN 20 MG: 20 TABLET, FILM COATED ORAL at 22:35

## 2021-01-28 RX ADMIN — DOCUSATE SODIUM 50 MG AND SENNOSIDES 8.6 MG 1 TABLET: 8.6; 5 TABLET, FILM COATED ORAL at 22:35

## 2021-01-28 RX ADMIN — FUROSEMIDE 20 MG: 10 INJECTION, SOLUTION INTRAMUSCULAR; INTRAVENOUS at 14:41

## 2021-01-28 RX ADMIN — MAGNESIUM GLUCONATE 500 MG ORAL TABLET 400 MG: 500 TABLET ORAL at 09:14

## 2021-01-28 RX ADMIN — IPRATROPIUM BROMIDE AND ALBUTEROL SULFATE 1 AMPULE: 2.5; .5 SOLUTION RESPIRATORY (INHALATION) at 13:07

## 2021-01-28 RX ADMIN — INSULIN GLARGINE 10 UNITS: 100 INJECTION, SOLUTION SUBCUTANEOUS at 22:46

## 2021-01-28 RX ADMIN — FERROUS SULFATE TAB 325 MG (65 MG ELEMENTAL FE) 325 MG: 325 (65 FE) TAB at 09:13

## 2021-01-28 RX ADMIN — IPRATROPIUM BROMIDE AND ALBUTEROL SULFATE 1 AMPULE: 2.5; .5 SOLUTION RESPIRATORY (INHALATION) at 19:39

## 2021-01-28 RX ADMIN — BETAMETHASONE DIPROPIONATE: 0.5 CREAM TOPICAL at 09:13

## 2021-01-28 RX ADMIN — INSULIN LISPRO 2 UNITS: 100 INJECTION, SOLUTION INTRAVENOUS; SUBCUTANEOUS at 22:47

## 2021-01-28 RX ADMIN — Medication 10 ML: at 04:41

## 2021-01-28 RX ADMIN — FERROUS SULFATE TAB 325 MG (65 MG ELEMENTAL FE) 325 MG: 325 (65 FE) TAB at 16:05

## 2021-01-28 RX ADMIN — Medication: at 22:33

## 2021-01-28 ASSESSMENT — PAIN SCALES - GENERAL
PAINLEVEL_OUTOF10: 0
PAINLEVEL_OUTOF10: 0

## 2021-01-28 NOTE — PLAN OF CARE
Problem: Falls - Risk of:  Goal: Will remain free from falls  Outcome: Met This Shift     Problem: Falls - Risk of:  Goal: Absence of physical injury  Outcome: Met This Shift     Problem: Discharge Planning:  Goal: Discharged to appropriate level of care  Outcome: Not Met This Shift     Problem: Pain:  Goal: Pain level will decrease  Outcome: Met This Shift     Problem: Tissue Perfusion - Cardiopulmonary, Altered:  Goal: Absence of angina  Outcome: Met This Shift     Problem: Pain:  Goal: Pain level will decrease  Outcome: Met This Shift

## 2021-01-28 NOTE — PROGRESS NOTES
[place]  Department of Internal Medicine  Division of Pulmonary, Critical Care and Sleep Medicine  Progress Note  Nesha Alas DO, [facility], Mel Gardiner MD, [facility]    Patient: Myrna Engel  MRN: [id number]  : 1940    Encounter Time: 10:35 AM     Date of Admission: 1/15/2021 11:51 AM    Primary Care Physician: WILVER Ramirez - GLEN    Reason for Consultation: Pre Op assesssment     SUBJECTIVE:    Still nausea  Generalized abd pain  KUB ok  Likely pain med inducted ileus - had BM this am  POD6    OBJECTIVE:     PHYSICAL EXAM:   VITALS:   Vitals:    21 0424 21 0756 21 0900 21 0913   BP: (!) 113/56  (!) 110/56 (!) 110/56   Pulse: 83  85 85   Resp: 16  18    Temp: 98 °F (36.7 °C)  98.6 °F (37 °C)    TempSrc: Temporal  Temporal    SpO2:  95% 97%    Weight:       Height:            Intake/Output Summary (Last 24 hours) at 2021 1035  Last data filed at 2021 8715  Gross per 24 hour   Intake 1376 ml   Output 680 ml   Net 696 ml        CONSTITUTIONAL:   Alert, NAD  SKIN:     No rash, Skin discolorationm fair skin with AK  HEENT:     EOMI, MMM, No thrush  NECK:    No bruits, No JVP apprechiated  CV:      Sinus,  No gallops  PULMONARY:   Couse BS,  No Wheezing, No Rales, No Rhonchi      No noted egophony  ABDOMEN:     Soft, non-tender. BS normal. No R/R/G  EXT:    No deformities . No clubbing. Trace lower extremity edema,   PULSE:   Palpable.   PSYCHIATRIC:  Seems appropriate, No acute psycosis  MS:    No fractures, No gross weakness  NEUROLOGIC:   Non-focal     DATA: IMAGING & TESTING:     LABORATORY TESTS:    CBC:   Lab Results   Component Value Date    WBC 7.8 2021    RBC 2.69 2021    HGB 7.8 2021    HCT 25.5 2021    MCV 94.8 2021    MCH 29.0 2021    MCHC 30.6 2021    RDW 15.9 2021     2021    MPV 9.7 2021     CMP:    Lab Results   Component Value Date     01/28/2021    K 4.1 01/28/2021    K 4.3 01/16/2021    CL 96 01/28/2021    CO2 30 01/28/2021    BUN 21 01/28/2021    CREATININE 0.9 01/28/2021    GFRAA >60 01/28/2021    LABGLOM >60 01/28/2021    GLUCOSE 150 01/28/2021    PROT 5.5 01/26/2021    LABALBU 3.3 01/26/2021    CALCIUM 7.4 01/28/2021    BILITOT 0.7 01/26/2021    ALKPHOS 104 01/26/2021    AST 39 01/26/2021    ALT 17 01/26/2021     Calcium:    Lab Results   Component Value Date    CALCIUM 7.4 01/28/2021        PRO-BNP:   Lab Results   Component Value Date    PROBNP 4,111 (H) 01/24/2021      ABGs:   Lab Results   Component Value Date    PH 7.332 01/22/2021    PO2 65.7 01/22/2021    PCO2 40.9 01/22/2021     Hemoglobin A1C: No components found for: HGBA1C    IMAGING:  Imaging tests were completed and reviewed and discussed radiology and care team involved and reveals     CHEST RADIOGRAM            Pulmonary function test: Pulmonary function test revealed forced vital capacity of 1.4 at 6 liters, 41% of predicted with an FEV1 of 1.5 at 7 liters, 44% ofpredicted with an FEV1/FVC ratio of 80%. Midflow rates are 60% of predicted with maximum voluntary ventilation at 20 liters per minute, 88% of predicted.     Static lung volumes with total vital capacity of 1.48 liters, 41% of predicted. Inspiratory capacity is 1.36 liters, 45% of predicted. Expiratory reserve volume 0.13 liters, 73% of predicted. The diffusion capacity is 17.17 mL/min per mmHg, which is 25% of predicted. Severely restricted lung physiology with noted moderate-to-severe loss in gas transfer. Echo Complete  TTE procedure:Echo Complete W/Doppler & Color Flow. Procedure Date Date: 01/16/2021 Start: 09:28 AM Study Location: Portable Technical Quality: Adequate visualization Indications:LV function. Patient Status: Routine Height: 67 inches Weight: 195 pounds BSA: 2 m^2 BMI: 30.54 kg/m^2  Findings   Left Ventricle  Left ventricle is normal in size.   Normal left ventricular wall thickness. Low normal ejection fraction. Ejection fraction is visually estimated at 55%. There is doppler evidence of stage I diastolic dysfunction. Right Ventricle  Mildly dilated right ventricle. Right ventricle global systolic function is normal.   Left Atrium  Normal sized left atrium. Interatrial septum appears intact. Right Atrium  Normal right atrium size. Mitral Valve  Structurally normal mitral valve. No evidence of mitral valve stenosis. Mild mitral regurgitation is present. Tricuspid Valve  The tricuspid valve appears structurally normal.  Physiologic and/or trace tricuspid regurgitation. Pulmonary hypertension is not present. Aortic Valve  Individual aortic valve leaflets are not clearly visualized. No hemodynamically significant aortic stenosis is present. Mild aortic regurgitation is noted. Pulmonic Valve  The pulmonic valve was not well visualized. No evidence of pulmonic valve stenosis. No evidence of any pulmonic regurgitation. Pericardial Effusion  No evidence of pericardial effusion. Pleural Effusion  No evidence of pleural effusion. Aorta  Aortic root dimension within normal limits. Ct Chest Wo Contrast    Result Date: 1/16/2021  FINDINGS: The heart is normal in size. Atherosclerotic calcifications are associated with the coronary arteries. No pericardial effusion. Ascending thoracic aorta measures 3.2 cm in diameter. Descending thoracic aorta measures 2.4 cm in diameter. There is a combined origin of brachiocephalic trunk and left common carotid artery at thoracic aortic arch. No mediastinal fluid collections. No mediastinal or hilar lymphadenopathy. There are no airspace opacities. There is a loculated right pleural effusion with adjacent subsegmental atelectasis. No pneumothorax. View of the upper abdomen shows normal bilateral adrenal glands. 1.  Loculated right pleural effusion with adjacent subsegmental atelectasis.  2.  No evidence of pneumonia. 3.  Atherosclerotic calcifications associated with the coronary arteries. Us Carotid Artery Bilateral  Result Date: 1/17/2021  Atherosclerotic disease. No hemodynamically significant stenosis is identified Estimated stenosis by NASCET criteria in the proximal right carotid artery is between 0% and 49%. Estimated stenosis by NASCET criteria in the proximal left carotid artery is between 0% and 49%. Assessment: See Nuñez is a 68-year-old gentleman seen for preoperative assessment with abnormal pulmonary assessment revealing severe restrictive physiologic defect with loss and diffusion capacity. The undergo bypass surgery in the next few days and we were asked to comment on preoperative risk. On 1/22/2021 underwent 1. Coronary artery bypass grafting x3 using left internal mammary artery to the left anterior descending artery, reverse saphenous vein graft to the ramus intermedius, and reverse saphenous vein graft to the distal right coronary artery. 2.  Lower extremity endoscopic vein harvest. 3.  Rigid sternal fixation with a KLS plating system. 4.  22 modifier. 1. Restrictive lung physiology  2. Acute Respiratory failure    3. MV CAD  4. Chronic pain  5. Post operative decondition  6. Anemai post op - transfusion  7. Sputum mixes  8. HLD  9. HTN  10. Loculated pleural effusion    Plan:   1. Continue with bronchodilators  2. Try to obtain the sputum samples -   3. FOllow serial abd exams, minimize narcs  4. Bowel prep noted  5. On zosyn for lung day 3/5   6. PEP valve  7. Encourage ICS and ambuliation  8. Oxygen support keep >90%  9. CPAP at night   10. ICS @ 2 hours to prevent atelectasis  11.  Continue PT       Patrick Lazcano DO, MPH, FCCP, Irina Case

## 2021-01-28 NOTE — PLAN OF CARE
Problem: Falls - Risk of:  Goal: Will remain free from falls  Description: Will remain free from falls  1/28/2021 1845 by Lex Mcdaniel RN  Outcome: Met This Shift  1/28/2021 0519 by Froilan Calderon RN  Outcome: Met This Shift  Goal: Absence of physical injury  Description: Absence of physical injury  1/28/2021 0519 by Froilan Calderon RN  Outcome: Met This Shift     Problem: Anxiety:  Goal: Level of anxiety will decrease  Description: Level of anxiety will decrease  Outcome: Met This Shift     Problem: Cardiac Output - Decreased:  Goal: Cardiac output within specified parameters  Description: Cardiac output within specified parameters  Outcome: Met This Shift     Problem: Pain:  Goal: Pain level will decrease  Description: Pain level will decrease  1/28/2021 0519 by Froilan Calderon RN  Outcome: Met This Shift     Problem: Tissue Perfusion - Cardiopulmonary, Altered:  Goal: Absence of angina  Description: Absence of angina  1/28/2021 0519 by Froilan Calderon RN  Outcome: Met This Shift     Problem: Pain:  Goal: Pain level will decrease  Description: Pain level will decrease  1/28/2021 0519 by Froilan Calderon RN  Outcome: Met This Shift

## 2021-01-28 NOTE — PROGRESS NOTES
Wound care: Attempted to see patient for gluteal cleft tear per nurse; patient up in chair, receiving blood, B/P pressure low. Will attempt when in bed.  Arturo Maldonado

## 2021-01-28 NOTE — PROGRESS NOTES
CTS PA notified that patient had a large, tarry liquid bowel movement that seems suspicious for GI bleed. Patient is currently on Aspirin, Plavix and Lovenox. Also notified that patient is hypotensive, BP 84/50. Patient asymptomatic at this time and receiving a blood transfusion. Also notified that ETHEL was removed and wound slightly dehisced. Orders obtained for betadine and dry dressing.

## 2021-01-28 NOTE — PROGRESS NOTES
General Surgery Progress Note:    CC: n/v     S: passing flatus and moving his bowels       Objective:  @BP (!) 110/56   Pulse 85   Temp 98.6 °F (37 °C) (Temporal)   Resp 18   Ht 5' 7\" (1.702 m)   Wt 201 lb (91.2 kg)   SpO2 97%   BMI 31.48 kg/m² @    Physical -     Gen: NAD  Resp:  no resp distress fairly clear b/l   CV: Reg Rate no extra heart sounds,   Abd: soft mild upper abd tenderness    EXT NVI,     Assessment/Plan: N/V s/p CABG     - bowel reg, ok for diet,  - I will s/o call if anything changes. Wadell Siemens MD FACS     11:05 AM      Made aware of dark stools and HH drop  Will plan for EGD tomorrow.     Wadell Siemens, MD

## 2021-01-28 NOTE — PROGRESS NOTES
Wound care: Attempted to see patient, not feeling well; will assess wound tomorrow. Plan of care discussed with nurse.   Shyla Anaya

## 2021-01-28 NOTE — PROGRESS NOTES
[facility] CARDIOLOGY PROGRESS NOTE  [facility]        Subjective: Seeing patient for non-ST elevation myocardial infarction, CABG January 22, 2021, chronic hypertension, hypoxia, DM, anemia with hemoglobin 7.8 today. He appears to still be somewhat uncomfortable just moving around in the bed. Afraid to cough or take a deep breath. To receive 1 unit of blood today for hemoglobin 7.8 and apparently some melanotic stool output today. Objective: Labs, chart, telemetry medications all reviewed. Patient Vitals for the past 24 hrs:   BP Temp Temp src Pulse Resp SpO2   01/28/21 1333 (!) 84/50 97.2 °F (36.2 °C) -- 83 16 98 %   01/28/21 1316 (!) 89/53 97 °F (36.1 °C) -- 84 18 98 %   01/28/21 1310 -- -- -- -- 20 100 %   01/28/21 1308 -- -- -- -- 20 100 %   01/28/21 1115 (!) 110/56 98.6 °F (37 °C) -- 62 16 92 %   01/28/21 0913 (!) 110/56 -- -- 85 -- --   01/28/21 0900 (!) 110/56 98.6 °F (37 °C) Temporal 85 18 97 %   01/28/21 0756 -- -- -- -- -- 95 %   01/28/21 0424 (!) 113/56 98 °F (36.7 °C) Temporal 83 16 --   01/27/21 2329 -- -- -- -- -- 96 %   01/27/21 2325 (!) 92/51 97.8 °F (36.6 °C) Temporal 75 18 98 %   01/27/21 2056 (!) 127/58 97.7 °F (36.5 °C) Temporal 92 20 94 %   01/27/21 2020 -- -- -- -- -- 93 %   01/27/21 2018 -- -- -- -- -- 93 %   01/27/21 1515 (!) 100/57 97.4 °F (36.3 °C) Temporal 70 18 98 %         Intake/Output Summary (Last 24 hours) at 1/28/2021 1438  Last data filed at 1/28/2021 1402  Gross per 24 hour   Intake 719 ml   Output 680 ml   Net 39 ml       Wt Readings from Last 3 Encounters:   01/27/21 201 lb (91.2 kg)       Telemetry:  I personally reviewed and shows normal sinus rhythm heart rate of 80.     Current meds: Scheduled Meds:   insulin glargine  10 Units Subcutaneous Nightly    furosemide  20 mg Intravenous See Admin Instructions    nystatin  5 mL Oral 4x Daily    metoprolol tartrate  25 mg Oral BID    piperacillin-tazobactam  3,375 mg Intravenous Q8H    enoxaparin  40 mg Subcutaneous Daily    polyethylene glycol  17 g Oral Daily    aspirin  81 mg Oral Daily    bisacodyl  5 mg Oral Daily    sennosides-docusate sodium  1 tablet Oral BID    magnesium hydroxide  30 mL Oral Daily    ferrous sulfate  325 mg Oral BID WC    vitamin C  500 mg Oral BID    folic acid  1 mg Oral Daily    bisacodyl  10 mg Rectal Daily    insulin lispro  0-18 Units Subcutaneous TID     insulin lispro  0-9 Units Subcutaneous Nightly    sodium chloride flush  10 mL Intravenous BID    ipratropium-albuterol  1 ampule Inhalation 4x daily    docosanol   Topical 5x Daily    rosuvastatin  20 mg Oral Nightly    magnesium oxide  400 mg Oral Daily    pantoprazole  40 mg Oral Daily    tamsulosin  0.4 mg Oral Daily    budesonide  0.5 mg Nebulization BID    betamethasone dipropionate   Topical BID    gabapentin  300 mg Oral BID     Continuous Infusions:   dextrose      sodium chloride      sodium chloride       PRN Meds:.glucose, dextrose, glucagon (rDNA), dextrose, sodium chloride, sodium chloride, acetaminophen, HYDROcodone 5 mg - acetaminophen **OR** HYDROcodone 5 mg - acetaminophen, potassium chloride, ondansetron, diphenhydrAMINE, benzocaine-menthol    Allergies: Shrimp (diagnostic) and Valium [diazepam]      Labs:   Recent Labs     01/26/21  0609 01/26/21 2048 01/27/21  0553 01/28/21  0450   WBC 9.6  --  8.9 7.8   HGB 7.7* 8.4* 8.2* 7.8*   HCT 24.5* 25.7* 25.6* 25.5*   MCV 93.9  --  93.8 94.8     --  153 164       Labs:   Recent Labs     01/26/21  0609 01/27/21  0553 01/28/21  0450    135 134   K 4.4 4.2 4.1   CL 96* 96* 96*   CO2 35* 35* 30*   BUN 33* 28* 21   CREATININE 1.0 1.1 0.9       Labs: No results for input(s): CKTOTAL, CKMB, CKMBINDEX, TROPONINI in the last 72 hours. Labs: No results for input(s): BNP in the last 72 hours. Labs: No results for input(s): CHOL, HDL, TRIG in the last 72 hours.     Invalid input(s): CHOLHDLR, LDLCALCU    Labs:   Recent Labs 01/26/21  1000   PROT 5.5*       Review of systems: No reported significant weight gain or weight loss. no dysuria or frequency, no dizziness, falls or trauma, no change in bowel or bladder habits, no hematochezia, hemoptysis or hematuria. No fevers, chills, nausea or vomiting reported. No significant wheezing or sputum production. No headache or visual changes. The remainder of the 10 review of systems otherwise negative. Exam      General: Patient comfortable in no distress and currently denies any chest pain. HEENT: Face symmetrical and no apparent cranial nerve deficit. Neck: No jugular venous distention, carotid bruit or thyromegaly. Lungs: Clear bilaterally without rales, wheezes or dullness. Cardiac: Regular rate and rhythm, no S3, S4, no rub or gallop. Abdomen: No rebound or guarding, no hepatosplenomegaly. Extremities: Without significant clubbing , cyanosis, or edema. Neuro:  No focal motor or sensory deficit apparent. Skin: No petechiae, no significant bruising. Assessment: See plan below      Plan: #1 CAD status post CABG last week January 22, 2021. Medical management but would agree with holding Plavix in the setting of anemia and maroon stool. To undergo further GI evaluation. Currently on low-dose metoprolol tartrate 25 mg twice a day. #2 hypertension and follow-up blood pressure and at times blood pressure low. Today systolic in the 35U and 85C at times. Also was yesterday at times. #3 anemia to receive 1 unit of blood today. Also received 20 mg of IV Lasix. Avoid dropping the blood pressure too low. Also received 1 unit of blood 2 days ago on January 26. .    #4  Diabetes and follow-up blood sugar.       Electronically signed by Marilin Hilario MD on 1/28/2021 at 2:38 PM

## 2021-01-28 NOTE — PROGRESS NOTES
POD# 6  Awake, alert. Complains of fatigue and lack of energy. Does still state he has some nonspecific nausea. Denies CP, palpitations, SOB at rest, dizziness/lightheadedness. Vitals:    01/28/21 0424 01/28/21 0756 01/28/21 0900 01/28/21 0913   BP: (!) 113/56  (!) 110/56 (!) 110/56   Pulse: 83  85 85   Resp: 16  18    Temp: 98 °F (36.7 °C)  98.6 °F (37 °C)    TempSrc: Temporal  Temporal    SpO2:  95% 97%    Weight:       Height:         O2: 2L/NC - 95%      Intake/Output Summary (Last 24 hours) at 1/28/2021 0950  Last data filed at 1/28/2021 6228  Gross per 24 hour   Intake 1376 ml   Output 680 ml   Net 696 ml         +BM       Recent Labs     01/26/21  0609 01/26/21  2048 01/27/21  0553 01/28/21  0450   WBC 9.6  --  8.9 7.8   HGB 7.7* 8.4* 8.2* 7.8*   HCT 24.5* 25.7* 25.6* 25.5*     --  153 164      Recent Labs     01/26/21  0609 01/27/21  0553 01/28/21  0450   BUN 33* 28* 21   CREATININE 1.0 1.1 0.9         Telemetry: NSR      PE  Cardiac: RRR  Lungs: decreased bases, upper airway mucus congestion noted, moist cough   Chest incision with intact ETHEL DSD. Sternum stable. Prior chest tube site incisions C/D/I, no erythema with intact sutures. Epicardial pacing wires present and secure. Abd: Soft, nontender, +BS  Ext: Incisions C/D/I, approximated, no erythema, + edema            A/P: POD# 6       1. CAD  --Stable s/p CABG x3 (LIMA-LAD, SVG-Ramus, SVG-RCA) on 1/22/2021  --Post op ELISEO reveals normal biventricular function and no valvular abnormalities  --Scr stable 0.9  --DAPT/statin/BB  --reinforce sternal precautions  -- remove epicardial pacing wires today. Epicardial pacing wires cut without difficulty.  Patient tolerated well  --all chest tubes out  --PICC placed 1/26        2. Acute blood loss anemia secondary to open heart surgery  -- hgb 7.8 - transfuse today ( was transfused on 1/26)  -- will give Lasix FDC through transfusion         3. ST  -- improved   --change metop to 25mg BID  --cam STOPPED today secondary to nausea         4. Prolonged postoperative respiratory insufficiency  --wean oxygen to keep SpO2 greater than or equal to 92%  --continue duonebs with ezpap  --encourage C&DB, SMI  --currently on 2L O2/NC  --pulmonary following   -- Abx per pulmonary   -- Lasix x 1 today         5. Constipation/abdominal distension  --+ BM this AM  -- Gen surgery following   -- still with nausea this AM per patient, will stop amio         6. DM 2  --hgA1c 6.6  --add home glipizide once improvement in oral intake - not yet  --continue high scale SSI  --recent steroid use may be adding to hyperglycemia  -- will add back nighttime lantus          7. Post operative deconditioning  --Increase activity as tolerated  --PT/OT  --currently ambulating 50ft        8. Cough with swallowing  - bedside swallow eval yesterday- passed  - zosyn started per pulm for sputum- agree especially given his recent emesis - aspiration protection        9. Post op nausea/emesis x 1/ generalized abd pain  - cont zofran  - check amylase, lipase, LFTs - all normal; amylase just slightly elevated   - STOP AMIO   - general surg with no intervention currently - no ileus noted on KUB - nausea  again this morning - zofran given        10.  Post op urinary retention  - cota resinserted 1/25  - on flomax  - cont cota until more ambulatory        DVT prophylaxis:  --continue bilateral knee high NICOLASA hose  --continue PCDs  --continue progressive ambulation  --on lovenox for dvt prophylaxis and continue knee high NICOLASA hose/pcds/progressive ambulation        Dispo: will need rehab; pt has been accepted to a facility and precert is not needed; will go when ready

## 2021-01-28 NOTE — CARE COORDINATION
SOCIAL WORK/CASEMANAGEMENT TRANSITION OF CARE [id number] [address], [place], [phone]): pt accepted to community care in [place]. They want updated notes which I sent this a.m. and a covid test within 48 hours of discharge. Left vm for daughter, Danya Plan, to call back to make sure she wants there instead of [facility] which has no covids t here vs community care having a covid unit. Stephen Dyson  1/28/2021  Sent updated chart information to luma marion as well. Luana Pedro the daughter called and she wants community care in [place]. The family plans on taking pt there on discharge if off o2. They are aware of private pay ambulette cost if on o2. Will not cancel [facility] in case something would fall through. Stephen Dyson  1/28/2021     Pt accepted to [facility] of [place] and I left a rapid covid in soft chart. All discharge paper work in place with exempt. Family to transport if off o2.  Stephen Dyson  1/29/2021

## 2021-01-28 NOTE — PROGRESS NOTES
Blood transfusion initiated. Patient monitored for first fifteen minutes. No adverse reaction noted.

## 2021-01-29 ENCOUNTER — ANESTHESIA EVENT (OUTPATIENT)
Dept: ENDOSCOPY | Age: 81
DRG: 233 | End: 2021-01-29
Payer: MEDICARE

## 2021-01-29 ENCOUNTER — ANESTHESIA (OUTPATIENT)
Dept: ENDOSCOPY | Age: 81
DRG: 233 | End: 2021-01-29
Payer: MEDICARE

## 2021-01-29 VITALS
DIASTOLIC BLOOD PRESSURE: 61 MMHG | SYSTOLIC BLOOD PRESSURE: 121 MMHG | RESPIRATION RATE: 20 BRPM | OXYGEN SATURATION: 100 %

## 2021-01-29 LAB
ANION GAP SERPL CALCULATED.3IONS-SCNC: 7 MMOL/L (ref 7–16)
BUN BLDV-MCNC: 15 MG/DL (ref 8–23)
CALCIUM SERPL-MCNC: 7.2 MG/DL (ref 8.6–10.2)
CHLORIDE BLD-SCNC: 98 MMOL/L (ref 98–107)
CO2: 33 MMOL/L (ref 22–29)
CREAT SERPL-MCNC: 0.9 MG/DL (ref 0.7–1.2)
GFR AFRICAN AMERICAN: >60
GFR NON-AFRICAN AMERICAN: >60 ML/MIN/1.73
GLUCOSE BLD-MCNC: 147 MG/DL (ref 74–99)
HCT VFR BLD CALC: 28.1 % (ref 37–54)
HEMOGLOBIN: 9 G/DL (ref 12.5–16.5)
MCH RBC QN AUTO: 28.8 PG (ref 26–35)
MCHC RBC AUTO-ENTMCNC: 32 % (ref 32–34.5)
MCV RBC AUTO: 90.1 FL (ref 80–99.9)
METER GLUCOSE: 160 MG/DL (ref 74–99)
METER GLUCOSE: 174 MG/DL (ref 74–99)
METER GLUCOSE: 226 MG/DL (ref 74–99)
PDW BLD-RTO: 18.5 FL (ref 11.5–15)
PLATELET # BLD: 181 E9/L (ref 130–450)
PMV BLD AUTO: 9.5 FL (ref 7–12)
POTASSIUM SERPL-SCNC: 4 MMOL/L (ref 3.5–5)
RBC # BLD: 3.12 E12/L (ref 3.8–5.8)
SODIUM BLD-SCNC: 138 MMOL/L (ref 132–146)
WBC # BLD: 7 E9/L (ref 4.5–11.5)

## 2021-01-29 PROCEDURE — 82962 GLUCOSE BLOOD TEST: CPT

## 2021-01-29 PROCEDURE — 6370000000 HC RX 637 (ALT 250 FOR IP): Performed by: PHYSICIAN ASSISTANT

## 2021-01-29 PROCEDURE — 6370000000 HC RX 637 (ALT 250 FOR IP): Performed by: NURSE PRACTITIONER

## 2021-01-29 PROCEDURE — 6370000000 HC RX 637 (ALT 250 FOR IP): Performed by: SURGERY

## 2021-01-29 PROCEDURE — 2140000000 HC CCU INTERMEDIATE R&B

## 2021-01-29 PROCEDURE — 2709999900 HC NON-CHARGEABLE SUPPLY: Performed by: SURGERY

## 2021-01-29 PROCEDURE — 7100000000 HC PACU RECOVERY - FIRST 15 MIN: Performed by: SURGERY

## 2021-01-29 PROCEDURE — 99233 SBSQ HOSP IP/OBS HIGH 50: CPT | Performed by: INTERNAL MEDICINE

## 2021-01-29 PROCEDURE — 3700000000 HC ANESTHESIA ATTENDED CARE: Performed by: SURGERY

## 2021-01-29 PROCEDURE — 36415 COLL VENOUS BLD VENIPUNCTURE: CPT

## 2021-01-29 PROCEDURE — 6360000002 HC RX W HCPCS: Performed by: SURGERY

## 2021-01-29 PROCEDURE — 6370000000 HC RX 637 (ALT 250 FOR IP): Performed by: INTERNAL MEDICINE

## 2021-01-29 PROCEDURE — 93798 PHYS/QHP OP CAR RHAB W/ECG: CPT

## 2021-01-29 PROCEDURE — 7100000001 HC PACU RECOVERY - ADDTL 15 MIN: Performed by: SURGERY

## 2021-01-29 PROCEDURE — 2580000003 HC RX 258: Performed by: SURGERY

## 2021-01-29 PROCEDURE — 6360000002 HC RX W HCPCS: Performed by: NURSE ANESTHETIST, CERTIFIED REGISTERED

## 2021-01-29 PROCEDURE — 2580000003 HC RX 258: Performed by: NURSE ANESTHETIST, CERTIFIED REGISTERED

## 2021-01-29 PROCEDURE — 80048 BASIC METABOLIC PNL TOTAL CA: CPT

## 2021-01-29 PROCEDURE — 2700000000 HC OXYGEN THERAPY PER DAY

## 2021-01-29 PROCEDURE — 2500000003 HC RX 250 WO HCPCS: Performed by: SURGERY

## 2021-01-29 PROCEDURE — 94660 CPAP INITIATION&MGMT: CPT

## 2021-01-29 PROCEDURE — 0DJ08ZZ INSPECTION OF UPPER INTESTINAL TRACT, VIA NATURAL OR ARTIFICIAL OPENING ENDOSCOPIC: ICD-10-PCS | Performed by: SURGERY

## 2021-01-29 PROCEDURE — 6360000002 HC RX W HCPCS: Performed by: INTERNAL MEDICINE

## 2021-01-29 PROCEDURE — 85027 COMPLETE CBC AUTOMATED: CPT

## 2021-01-29 PROCEDURE — 3700000001 HC ADD 15 MINUTES (ANESTHESIA): Performed by: SURGERY

## 2021-01-29 PROCEDURE — 2580000003 HC RX 258: Performed by: INTERNAL MEDICINE

## 2021-01-29 PROCEDURE — 43235 EGD DIAGNOSTIC BRUSH WASH: CPT | Performed by: SURGERY

## 2021-01-29 PROCEDURE — 3609017100 HC EGD: Performed by: SURGERY

## 2021-01-29 RX ORDER — SODIUM CHLORIDE 9 MG/ML
INJECTION, SOLUTION INTRAVENOUS CONTINUOUS PRN
Status: DISCONTINUED | OUTPATIENT
Start: 2021-01-29 | End: 2021-01-29 | Stop reason: SDUPTHER

## 2021-01-29 RX ORDER — SUCRALFATE 1 G/1
1 TABLET ORAL EVERY 6 HOURS SCHEDULED
Status: DISCONTINUED | OUTPATIENT
Start: 2021-01-29 | End: 2021-02-01 | Stop reason: HOSPADM

## 2021-01-29 RX ORDER — FUROSEMIDE 10 MG/ML
20 INJECTION INTRAMUSCULAR; INTRAVENOUS SEE ADMIN INSTRUCTIONS
Status: DISCONTINUED | OUTPATIENT
Start: 2021-01-29 | End: 2021-02-01 | Stop reason: HOSPADM

## 2021-01-29 RX ORDER — PROPOFOL 10 MG/ML
INJECTION, EMULSION INTRAVENOUS PRN
Status: DISCONTINUED | OUTPATIENT
Start: 2021-01-29 | End: 2021-01-29 | Stop reason: SDUPTHER

## 2021-01-29 RX ADMIN — ROSUVASTATIN 20 MG: 20 TABLET, FILM COATED ORAL at 22:07

## 2021-01-29 RX ADMIN — BETAMETHASONE DIPROPIONATE: 0.5 CREAM TOPICAL at 09:32

## 2021-01-29 RX ADMIN — ANTI-FUNGAL POWDER MICONAZOLE NITRATE TALC FREE: 1.42 POWDER TOPICAL at 22:07

## 2021-01-29 RX ADMIN — ENOXAPARIN SODIUM 40 MG: 40 INJECTION SUBCUTANEOUS at 16:25

## 2021-01-29 RX ADMIN — OXYCODONE HYDROCHLORIDE AND ACETAMINOPHEN 500 MG: 500 TABLET ORAL at 22:07

## 2021-01-29 RX ADMIN — Medication 10 ML: at 22:17

## 2021-01-29 RX ADMIN — NYSTATIN 500000 UNITS: 100000 SUSPENSION ORAL at 16:25

## 2021-01-29 RX ADMIN — HYDROCODONE BITARTRATE AND ACETAMINOPHEN 2 TABLET: 5; 325 TABLET ORAL at 22:06

## 2021-01-29 RX ADMIN — ASPIRIN 81 MG: 81 TABLET, COATED ORAL at 16:30

## 2021-01-29 RX ADMIN — PANTOPRAZOLE SODIUM 40 MG: 40 TABLET, DELAYED RELEASE ORAL at 16:25

## 2021-01-29 RX ADMIN — PIPERACILLIN AND TAZOBACTAM 3375 MG: 3; .375 INJECTION, POWDER, LYOPHILIZED, FOR SOLUTION INTRAVENOUS at 05:26

## 2021-01-29 RX ADMIN — GABAPENTIN 300 MG: 300 CAPSULE ORAL at 09:31

## 2021-01-29 RX ADMIN — INSULIN LISPRO 3 UNITS: 100 INJECTION, SOLUTION INTRAVENOUS; SUBCUTANEOUS at 22:08

## 2021-01-29 RX ADMIN — SODIUM CHLORIDE: 9 INJECTION, SOLUTION INTRAVENOUS at 13:50

## 2021-01-29 RX ADMIN — DIPHENHYDRAMINE HYDROCHLORIDE 25 MG: 25 TABLET ORAL at 02:22

## 2021-01-29 RX ADMIN — GABAPENTIN 300 MG: 300 CAPSULE ORAL at 22:18

## 2021-01-29 RX ADMIN — TAMSULOSIN HYDROCHLORIDE 0.4 MG: 0.4 CAPSULE ORAL at 09:00

## 2021-01-29 RX ADMIN — FERROUS SULFATE TAB 325 MG (65 MG ELEMENTAL FE) 325 MG: 325 (65 FE) TAB at 16:24

## 2021-01-29 RX ADMIN — METOPROLOL TARTRATE 25 MG: 25 TABLET, FILM COATED ORAL at 22:07

## 2021-01-29 RX ADMIN — NYSTATIN 500000 UNITS: 100000 SUSPENSION ORAL at 09:31

## 2021-01-29 RX ADMIN — Medication: at 09:34

## 2021-01-29 RX ADMIN — METOPROLOL TARTRATE 25 MG: 25 TABLET, FILM COATED ORAL at 09:31

## 2021-01-29 RX ADMIN — SUCRALFATE 1 G: 1 TABLET ORAL at 16:24

## 2021-01-29 RX ADMIN — INSULIN GLARGINE 10 UNITS: 100 INJECTION, SOLUTION SUBCUTANEOUS at 22:08

## 2021-01-29 RX ADMIN — MAGNESIUM GLUCONATE 500 MG ORAL TABLET 400 MG: 500 TABLET ORAL at 16:31

## 2021-01-29 RX ADMIN — PROPOFOL 100 MG: 10 INJECTION, EMULSION INTRAVENOUS at 13:53

## 2021-01-29 RX ADMIN — Medication: at 22:18

## 2021-01-29 RX ADMIN — NYSTATIN 500000 UNITS: 100000 SUSPENSION ORAL at 22:06

## 2021-01-29 ASSESSMENT — PAIN SCALES - GENERAL
PAINLEVEL_OUTOF10: 0
PAINLEVEL_OUTOF10: 9
PAINLEVEL_OUTOF10: 0

## 2021-01-29 NOTE — PROGRESS NOTES
[place]  Department of Internal Medicine  Division of Pulmonary, Critical Care and Sleep Medicine  Progress Note  Darling Mcdaniel DO, FCCP, [facility], [facility]  Kathrin Hunter MD, [facility]    Patient: Latonia Valadez  MRN: [id number]  : 1940    Encounter Time: 11:30 AM     Date of Admission: 1/15/2021 11:51 AM    Primary Care Physician: WILVER Koehler NP    Reason for Consultation: Pre Op assesssment     SUBJECTIVE:    + BM  Cultures negative   Stop Abx  Diuresis today  POD#7     OBJECTIVE:     PHYSICAL EXAM:   VITALS:   Vitals:    21 0227 21 0645 21 0754 21 0845   BP: (!) 108/58   120/62   Pulse: 78   80   Resp: 18   20   Temp: 97.3 °F (36.3 °C)   97.7 °F (36.5 °C)   TempSrc: Oral   Temporal   SpO2:   94% 95%   Weight:  195 lb 12.8 oz (88.8 kg)     Height:            Intake/Output Summary (Last 24 hours) at 2021 1130  Last data filed at 2021 0645  Gross per 24 hour   Intake 922 ml   Output 550 ml   Net 372 ml        CONSTITUTIONAL:   Alert, NAD  SKIN:     No rash, Skin discolorationm fair skin with AK  HEENT:     EOMI, MMM, No thrush  NECK:    No bruits, No JVP apprechiated  CV:      Sinus,  No gallops  PULMONARY:   Couse BS,  No Wheezing, No Rales, No Rhonchi      No noted egophony  ABDOMEN:     Soft, non-tender. BS normal. No R/R/G  EXT:    No deformities . No clubbing. Trace lower extremity edema,   PULSE:   Palpable.   PSYCHIATRIC:  Seems appropriate, No acute psycosis  MS:    No fractures, No gross weakness  NEUROLOGIC:   Non-focal     DATA: IMAGING & TESTING:     LABORATORY TESTS:    CBC:   Lab Results   Component Value Date    WBC 7.0 2021    RBC 3.12 2021    HGB 9.0 2021    HCT 28.1 2021    MCV 90.1 2021    MCH 28.8 2021    MCHC 32.0 2021    RDW 18.5 2021     2021    MPV 9.5 2021     CMP:    Lab Results   Component Value Date     2021    K 4.0 01/29/2021    K 4.3 01/16/2021    CL 98 01/29/2021    CO2 33 01/29/2021    BUN 15 01/29/2021    CREATININE 0.9 01/29/2021    GFRAA >60 01/29/2021    LABGLOM >60 01/29/2021    GLUCOSE 147 01/29/2021    PROT 5.5 01/26/2021    LABALBU 3.3 01/26/2021    CALCIUM 7.2 01/29/2021    BILITOT 0.7 01/26/2021    ALKPHOS 104 01/26/2021    AST 39 01/26/2021    ALT 17 01/26/2021     Calcium:    Lab Results   Component Value Date    CALCIUM 7.2 01/29/2021        PRO-BNP:   Lab Results   Component Value Date    PROBNP 4,111 (H) 01/24/2021      ABGs:   Lab Results   Component Value Date    PH 7.332 01/22/2021    PO2 65.7 01/22/2021    PCO2 40.9 01/22/2021     Hemoglobin A1C: No components found for: HGBA1C    IMAGING:  Imaging tests were completed and reviewed and discussed radiology and care team involved and reveals     CHEST RADIOGRAM            Pulmonary function test: Pulmonary function test revealed forced vital capacity of 1.4 at 6 liters, 41% of predicted with an FEV1 of 1.5 at 7 liters, 44% ofpredicted with an FEV1/FVC ratio of 80%. Midflow rates are 60% of predicted with maximum voluntary ventilation at 20 liters per minute, 88% of predicted.     Static lung volumes with total vital capacity of 1.48 liters, 41% of predicted. Inspiratory capacity is 1.36 liters, 45% of predicted. Expiratory reserve volume 0.13 liters, 73% of predicted. The diffusion capacity is 17.17 mL/min per mmHg, which is 25% of predicted. Severely restricted lung physiology with noted moderate-to-severe loss in gas transfer. Echo Complete  TTE procedure:Echo Complete W/Doppler & Color Flow. Procedure Date Date: 01/16/2021 Start: 09:28 AM Study Location: Portable Technical Quality: Adequate visualization Indications:LV function. Patient Status: Routine Height: 67 inches Weight: 195 pounds BSA: 2 m^2 BMI: 30.54 kg/m^2  Findings   Left Ventricle  Left ventricle is normal in size. Normal left ventricular wall thickness.   Low normal ejection fraction. Ejection fraction is visually estimated at 55%. There is doppler evidence of stage I diastolic dysfunction. Right Ventricle  Mildly dilated right ventricle. Right ventricle global systolic function is normal.   Left Atrium  Normal sized left atrium. Interatrial septum appears intact. Right Atrium  Normal right atrium size. Mitral Valve  Structurally normal mitral valve. No evidence of mitral valve stenosis. Mild mitral regurgitation is present. Tricuspid Valve  The tricuspid valve appears structurally normal.  Physiologic and/or trace tricuspid regurgitation. Pulmonary hypertension is not present. Aortic Valve  Individual aortic valve leaflets are not clearly visualized. No hemodynamically significant aortic stenosis is present. Mild aortic regurgitation is noted. Pulmonic Valve  The pulmonic valve was not well visualized. No evidence of pulmonic valve stenosis. No evidence of any pulmonic regurgitation. Pericardial Effusion  No evidence of pericardial effusion. Pleural Effusion  No evidence of pleural effusion. Aorta  Aortic root dimension within normal limits. Ct Chest Wo Contrast    Result Date: 1/16/2021  FINDINGS: The heart is normal in size. Atherosclerotic calcifications are associated with the coronary arteries. No pericardial effusion. Ascending thoracic aorta measures 3.2 cm in diameter. Descending thoracic aorta measures 2.4 cm in diameter. There is a combined origin of brachiocephalic trunk and left common carotid artery at thoracic aortic arch. No mediastinal fluid collections. No mediastinal or hilar lymphadenopathy. There are no airspace opacities. There is a loculated right pleural effusion with adjacent subsegmental atelectasis. No pneumothorax. View of the upper abdomen shows normal bilateral adrenal glands. 1.  Loculated right pleural effusion with adjacent subsegmental atelectasis. 2.  No evidence of pneumonia.  3.  Atherosclerotic calcifications associated with the coronary arteries. Us Carotid Artery Bilateral  Result Date: 1/17/2021  Atherosclerotic disease. No hemodynamically significant stenosis is identified Estimated stenosis by NASCET criteria in the proximal right carotid artery is between 0% and 49%. Estimated stenosis by NASCET criteria in the proximal left carotid artery is between 0% and 49%. Assessment: Deric Carballo is a 68-year-old gentleman seen for preoperative assessment with abnormal pulmonary assessment revealing severe restrictive physiologic defect with loss and diffusion capacity. The undergo bypass surgery in the next few days and we were asked to comment on preoperative risk. On 1/22/2021 underwent 1. Coronary artery bypass grafting x3 using left internal mammary artery to the left anterior descending artery, reverse saphenous vein graft to the ramus intermedius, and reverse saphenous vein graft to the distal right coronary artery. 2.  Lower extremity endoscopic vein harvest. 3.  Rigid sternal fixation with a KLS plating system. 4.  22 modifier. 1. Restrictive lung physiology  2. Acute Respiratory failure    3. MV CAD  4. Chronic pain  5. Post operative decondition  6. Anemai post op - transfusion  7. Sputum mixes  8. HLD  9. HTN  10. Loculated pleural effusion    Plan:   1. Continue with bronchodilators  2. Sputum samples - negative  3. Serial abd exams, minimize narcs - improved  4. Bowel prep noted  5. Stop zosyn    6. PEP valve  7. Encourage ICS and ambuliation  8. Oxygen support keep >90%  9. CPAP at night   10. ICS @ 2 hours to prevent atelectasis  11. Continue PT   12.  Diuresis today      Segundo Buchanan DO, MPH, [facility], [facility]

## 2021-01-29 NOTE — PLAN OF CARE
Problem:  Activity Intolerance:  Goal: Able to perform prescribed physical activity  Description: Able to perform prescribed physical activity  Outcome: Ongoing

## 2021-01-29 NOTE — PROGRESS NOTES
Date: 1/28/2021    Time: 10:54 PM    Patient Placed On BIPAP/CPAP/ Non-Invasive Ventilation?   No    If no must comment    Comments: patient refuses to wear  BIPAP      Manohar Whitman

## 2021-01-29 NOTE — ANESTHESIA PRE PROCEDURE
Department of Anesthesiology  Preprocedure Note       Name:  Shelley Tam   Age:  68 y.o.  :  1940                                          MRN:  [id number]         Date:  2021      Surgeon: Merari Rivers):  Shahbaz Hernandez MD    Procedure: Procedure(s):  EGD ESOPHAGOGASTRODUODENOSCOPY    Medications prior to admission:   Prior to Admission medications    Medication Sig Start Date End Date Taking? Authorizing Provider   omeprazole (PRILOSEC) 20 MG delayed release capsule Take 20 mg by mouth daily   Yes Historical Provider, MD   MELATONIN PO Take by mouth   Yes Historical Provider, MD   celecoxib (CELEBREX) 200 MG capsule Take 1 capsule by mouth daily 20  Yes Lavinia Coad, APRN - NP   glipiZIDE (GLUCOTROL) 5 MG tablet Take 1 tablet by mouth 2 times daily (before meals) 20  Yes Lavinia Coad, APRN - NP   metoprolol succinate (TOPROL XL) 25 MG extended release tablet Take 1 tablet by mouth 2 times daily 20  Yes Lavinia Coad, APRN - NP   gabapentin (NEURONTIN) 300 MG capsule Take 1 capsule by mouth 2 times daily for 30 days.  9/9/20 10/9/20  Lavinia Coad, APRN - NP       Current medications:    Current Facility-Administered Medications   Medication Dose Route Frequency Provider Last Rate Last Admin    miconazole (MICOTIN) 2 % powder   Topical BID Caye Shingles, DO        furosemide (LASIX) injection 20 mg  20 mg Intravenous See Admin Instructions Gurdeep Min, DO        insulin glargine (LANTUS) injection vial 10 Units  10 Units Subcutaneous Nightly Hardy Quiroz PA-C   10 Units at 21 2246    glucose (GLUTOSE) 40 % oral gel 15 g  15 g Oral PRN Hardy Quiroz PA-C        dextrose 50 % IV solution  12.5 g Intravenous PRN Diagopal Quiroz PA-C        glucagon (rDNA) injection 1 mg  1 mg Intramuscular PRN Hardy Qiuroz PA-C        dextrose 5 % solution  100 mL/hr Intravenous PRN Hardy Quiroz PA-C  0.9 % sodium chloride infusion   Intravenous PRN Hodan Bee PA-C        Mineral Oil-Hydrophil Petrolat OINT   Topical BID Momounhedy Sotoi, DO   Given at 01/29/21 3030    And    Mineral Oil-Hydrophil Petrolat OINT   Topical TID PRN Momounhedy Campi, DO        nystatin (MYCOSTATIN) 837331 UNIT/ML suspension 500,000 Units  5 mL Oral 4x Daily [facility]   500,000 Units at 01/29/21 0931    0.9 % sodium chloride infusion   Intravenous PRN SAMUEL Nieves        metoprolol tartrate (LOPRESSOR) tablet 25 mg  25 mg Oral BID SAMUEL Nieves   25 mg at 01/29/21 0931    enoxaparin (LOVENOX) injection 40 mg  40 mg Subcutaneous Daily WILVER Evans - CNP   40 mg at 01/28/21 0913    polyethylene glycol (GLYCOLAX) packet 17 g  17 g Oral Daily Sarah Sexton APRN - CNP   17 g at 01/27/21 0855    aspirin EC tablet 81 mg  81 mg Oral Daily Sarah Sexton APRN - CNP   81 mg at 01/28/21 0914    acetaminophen (TYLENOL) tablet 650 mg  650 mg Oral Q4H PRN Sarah Sexton APRN - CNP   650 mg at 01/27/21 2126    HYDROcodone-acetaminophen (NORCO) 5-325 MG per tablet 1 tablet  1 tablet Oral Q4H PRN WILVER Evans CNP        Or    HYDROcodone-acetaminophen (NORCO) 5-325 MG per tablet 2 tablet  2 tablet Oral Q4H PRN Sarah Sexton APRN - CNP   2 tablet at 01/26/21 5102    bisacodyl (DULCOLAX) EC tablet 5 mg  5 mg Oral Daily Sarah Sexton, APRN - CNP   5 mg at 01/27/21 0855    sennosides-docusate sodium (SENOKOT-S) 8.6-50 MG tablet 1 tablet  1 tablet Oral BID WILVER Evans - CNP   1 tablet at 01/28/21 2235    magnesium hydroxide (MILK OF MAGNESIA) 400 MG/5ML suspension 30 mL  30 mL Oral Daily Sarah Sexton APRN - CNP   30 mL at 01/27/21 0853    ferrous sulfate (IRON 325) tablet 325 mg  325 mg Oral BID  Sarah Sexton APRN - CNP   325 mg at 01/28/21 1605    ascorbic acid (VITAMIN C) tablet 500 mg  500 mg Oral BID WILVER Evans CNP   500 mg at 01/28/21 0339  folic acid (FOLVITE) tablet 1 mg  1 mg Oral Daily Raeann Jameson, APRN - CNP   1 mg at 01/28/21 0914    potassium chloride (KLOR-CON M) extended release tablet 20 mEq  20 mEq Oral PRN Raeann Jameson, APRN - CNP        ondansetron [facility]) injection 4 mg  4 mg Intravenous Q8H PRN Raeann Jameson, APRN - CNP   4 mg at 01/27/21 2124    bisacodyl (DULCOLAX) suppository 10 mg  10 mg Rectal Daily Raeann Gisell, APRN - CNP   10 mg at 01/25/21 0825    diphenhydrAMINE (BENADRYL) tablet 25 mg  25 mg Oral Nightly PRN Raeann Jameson, APRN - CNP   25 mg at 01/29/21 0222    insulin lispro (HUMALOG) injection vial 0-18 Units  0-18 Units Subcutaneous TID  Edmund Ceballos MD   6 Units at 01/28/21 1117    insulin lispro (HUMALOG) injection vial 0-9 Units  0-9 Units Subcutaneous Nightly Edmund Ceballos MD   2 Units at 01/28/21 2247    benzocaine-menthol (CEPACOL SORE THROAT) lozenge 1 lozenge  1 lozenge Oral Q2H PRN Faheem Alas DO   1 lozenge at 01/23/21 1427    sodium chloride flush 0.9 % injection 10 mL  10 mL Intravenous BID Marion Jackson, DO   10 mL at 01/28/21 2236    ipratropium-albuterol (DUONEB) nebulizer solution 1 ampule  1 ampule Inhalation 4x daily Faheem Alas DO   1 ampule at 01/28/21 1939    docosanol (ABREVA) 10 % cream   Topical 5x Daily Marion Jackson, DO   Stopped at 01/28/21 1100    rosuvastatin (CRESTOR) tablet 20 mg  20 mg Oral Nightly Raeann Jameson, APRN - CNP   20 mg at 01/28/21 2235    magnesium oxide (MAG-OX) tablet 400 mg  400 mg Oral Daily Raeann Jameson, APRN - CNP   400 mg at 01/28/21 0914    pantoprazole (PROTONIX) tablet 40 mg  40 mg Oral Daily Raeann Jameson, APRN - CNP   40 mg at 01/28/21 0913    tamsulosin (FLOMAX) capsule 0.4 mg  0.4 mg Oral Daily Raeann Jameson APRN - CNP   0.4 mg at 01/29/21 0900    budesonide (PULMICORT) nebulizer suspension 500 mcg  0.5 mg Nebulization BID Raeann Jameson APRN - CNP   500 mcg at 01/28/21 1606  gabapentin (NEURONTIN) capsule 300 mg  300 mg Oral BID Le Given, APRN - CNP   300 mg at 01/29/21 9705       Allergies:     Allergies   Allergen Reactions    Shrimp (Diagnostic)     Valium [Diazepam]        Problem List:    Patient Active Problem List   Diagnosis Code    Stable angina pectoris (HCC) I20.8    COPD (chronic obstructive pulmonary disease) (HCC) J44.9    HTN (hypertension) I10    Type 2 diabetes mellitus ([address] E11.9    HLD (hyperlipidemia) E78.5    CAD (coronary artery disease) I25.10    Preop cardiovascular exam Z01.810       Past Medical History:        Diagnosis Date    Arthritis     CAD (coronary artery disease) 1/16/2021    [facility]     COPD ([facility]ronic obstructive pulmonary disease) [facility]     Diabetes mellitus ([facility]     HTN (hypertension) 1/16/2021    Hx of blood clots     Hyperlipidemia        Past Surgical History:        Procedure Laterality Date    BACK SURGERY  1995    CHOLECYSTECTOMY  2003    CORONARY ARTERY BYPASS GRAFT N/A 1/22/2021    CORONARY ARTERY BYPASS, ELISEO -- WANTS 0700 performed by Bernardo William DO at [facility] SKIN CANCER EXCISION         Social History:    Social History     Tobacco Use    Smoking status: Former Smoker    Smokeless tobacco: Never Used   Substance Use Topics    Alcohol use: Not on file                                Counseling given: Not Answered      Vital Signs (Current):   Vitals:    01/29/21 0754 01/29/21 0845 01/29/21 1146 01/29/21 1327   BP:  120/62 (!) 111/58    Pulse:  80 70    Resp:  20 20    Temp:  36.5 °C (97.7 °F) 36.8 °C (98.2 °F)    TempSrc:  Temporal Temporal    SpO2: 94% 95%     Weight:       Height:    5' 7\" (1.702 m)                                              BP Readings from Last 3 Encounters:   01/29/21 (!) 111/58   01/22/21 119/76       NPO Status: Time of last liquid consumption: 2300                        Time of last solid consumption: 1800                        Date of last liquid consumption: 01/21/21 Date of last solid food consumption: 01/21/21    BMI:   Wt Readings from Last 3 Encounters:   01/29/21 195 lb 12.8 oz (88.8 kg)     Body mass index is 30.67 kg/m². CBC:   Lab Results   Component Value Date    WBC 7.0 01/29/2021    RBC 3.12 01/29/2021    HGB 9.0 01/29/2021    HCT 28.1 01/29/2021    MCV 90.1 01/29/2021    RDW 18.5 01/29/2021     01/29/2021       CMP:   Lab Results   Component Value Date     01/29/2021    K 4.0 01/29/2021    K 4.3 01/16/2021    CL 98 01/29/2021    CO2 33 01/29/2021    BUN 15 01/29/2021    CREATININE 0.9 01/29/2021    GFRAA >60 01/29/2021    LABGLOM >60 01/29/2021    GLUCOSE 147 01/29/2021    PROT 5.5 01/26/2021    CALCIUM 7.2 01/29/2021    BILITOT 0.7 01/26/2021    ALKPHOS 104 01/26/2021    AST 39 01/26/2021    ALT 17 01/26/2021       POC Tests: No results for input(s): POCGLU, POCNA, POCK, POCCL, POCBUN, POCHEMO, POCHCT in the last 72 hours.     Coags:   Lab Results   Component Value Date    PROTIME 14.8 01/22/2021    INR 1.3 01/22/2021    APTT 23.1 01/22/2021       HCG (If Applicable): No results found for: PREGTESTUR, PREGSERUM, HCG, HCGQUANT     ABGs:   Lab Results   Component Value Date    PO2ART 167.9 01/22/2021    JSO3GCY 38.0 01/22/2021    SBY4ABB 22.1 01/22/2021        Type & Screen (If Applicable):  No results found for: LABABO, LABRH    Drug/Infectious Status (If Applicable):  No results found for: HIV, HEPCAB    COVID-19 Screening (If Applicable):   Lab Results   Component Value Date    COVID19 Not Detected 01/11/2021         Anesthesia Evaluation  Patient summary reviewed and Nursing notes reviewed no history of anesthetic complications:   Airway: Mallampati: III  TM distance: <3 FB   Neck ROM: full  Mouth opening: < 3 FB Dental:      Comment: Overall poor dentition    Pulmonary:   (+) COPD:  decreased breath sounds,                             Cardiovascular:    (+) hypertension:, CAD:, CABG/stent ( S/P CABGx3 on 1/22):, ECG reviewed  Rhythm: regular  Rate: normal  Echocardiogram reviewed               ROS comment: ECHO 1/16/21   Findings      Left Ventricle   Left ventricle is normal in size. Normal left ventricular wall thickness. Low normal ejection fraction. Ejection fraction is visually estimated at 55%. There is doppler evidence of stage I diastolic dysfunction. Neuro/Psych:               GI/Hepatic/Renal:   (+) GERD:,          ROS comment: Suspected GIB. Endo/Other:    (+) DiabetesType II DM, , blood dyscrasia ( plavix and lovenox last dose 1/28): anticoagulation therapy:., malignancy/cancer. Abdominal:           Vascular:   + PE.        ROS comment: Hx of blood clots-per pt distant hx of PEs. Anesthesia Plan      MAC     ASA 4       Induction: intravenous. Anesthetic plan and risks discussed with patient. Plan discussed with attending. Attending anesthesiologist reviewed and agrees with [address] [place] [address], APRN - CRNA   1/29/2021        DOS STAFF ADDENDUM:    Pt seen and examined, physical exam updated, chart reviewed including anesthesia, drug and allergy history. H&P reviewed. No interval changes to history or physical examination (unless noted above). NPO status confirmed. Anesthetic plan, risks, benefits, alternatives discussed with patient. Patient verbalized an understanding and agrees to proceed.      Shira Stover MD  Anesthesiologist

## 2021-01-29 NOTE — CARE COORDINATION
SOCIAL WORK/CASEMANAGEMENT TRANSITION OF CARE [id number], [address], [place], [phone]): called katiuska at [place] at [facility] that pt may be ready as early as Sunday. Pt for EGD today. On o2 at 2l nc. Family to take if off o2. All discharge paperwork is in place with exempt and the cardiac protocol. Nino Arenas  . 1/29/2021

## 2021-01-29 NOTE — ANESTHESIA POSTPROCEDURE EVALUATION
Department of Anesthesiology  Postprocedure Note    Patient: Rasheed Quispe  MRN: [id number]  YOB: 1940  Date of evaluation: 1/29/2021  Time:  3:09 PM     Procedure Summary     Date: 01/29/21 Room / Location: [facility] 01 / [facility]    Anesthesia Start: 1350 Anesthesia Stop: 6684    Procedure: EGD DIAGNOSTIC ONLY (N/A ) Diagnosis: (/)    Surgeons: Elaina Kaiser MD Responsible Provider: Jennifer Monroe MD    Anesthesia Type: MAC ASA Status: 4          Anesthesia Type: No value filed. Lorna Phase I: Lorna Score: 10    Lorna Phase II:      Last vitals: Reviewed and per EMR flowsheets.        Anesthesia Post Evaluation    Patient location during evaluation: PACU  Patient participation: complete - patient participated  Level of consciousness: awake and alert  Pain score: 0  Airway patency: patent  Nausea & Vomiting: no vomiting and no nausea  Complications: no  Cardiovascular status: hemodynamically stable  Respiratory status: spontaneous ventilation  Hydration status: stable

## 2021-01-29 NOTE — PROGRESS NOTES
[facility] ARDIOLOGY PROGRESS NOTE  [facility]        Subjective: Non-ST elevation myocardial infarction on presentation subsequently has undergone CABG January 22, 2021 and has been slow to recovery due to hypoxia, anemia, chronic pain and he appears to always be somewhat uncomfortable just laying in bed. Denies any indication currently of overt bleeding or stroke symptoms. Ambulated with physical therapy without significant difficulty. He ate well without nausea or vomiting. Objective: Medications, chart telemetry and labs all reviewed. Patient Vitals for the past 24 hrs:   BP Temp Temp src Pulse Resp SpO2 Height Weight   01/29/21 1430 121/66 -- -- 75 18 97 % -- --   01/29/21 1419 131/69 98 °F (36.7 °C) -- 73 18 99 % -- --   01/29/21 1410 133/64 -- -- 75 16 -- -- --   01/29/21 1403 128/62 -- -- 78 20 98 % -- --   01/29/21 1327 -- -- -- -- -- -- 5' 7\" (1.702 m) --   01/29/21 1146 (!) 111/58 98.2 °F (36.8 °C) Temporal 70 20 -- -- --   01/29/21 0845 120/62 97.7 °F (36.5 °C) Temporal 80 20 95 % -- --   01/29/21 0754 -- -- -- -- -- 94 % -- --   01/29/21 0645 -- -- -- -- -- -- -- 195 lb 12.8 oz (88.8 kg)   01/29/21 0227 (!) 108/58 97.3 °F (36.3 °C) Oral 78 18 -- -- --   01/28/21 2226 133/60 -- -- 86 18 96 % -- --   01/28/21 1940 134/69 97.7 °F (36.5 °C) Oral 80 18 100 % -- --   01/28/21 1800 (!) 100/53 97.7 °F (36.5 °C) -- 83 20 98 % -- --   01/28/21 1645 125/69 96.8 °F (36 °C) Temporal 79 18 97 % -- --         Intake/Output Summary (Last 24 hours) at 1/29/2021 1513  Last data filed at 1/29/2021 1401  Gross per 24 hour   Intake 640 ml   Output 400 ml   Net 240 ml       Wt Readings from Last 3 Encounters:   01/29/21 195 lb 12.8 oz (88.8 kg)       Telemetry: I personally reviewed and shows normal sinus rhythm heart rate in the 70s.   Current meds: Scheduled Meds:   miconazole   Topical BID    furosemide  20 mg Intravenous See Admin Instructions    insulin glargine  10 Units Subcutaneous Nightly    Mineral Oil-Hydrophil Petrolat   Topical BID    nystatin  5 mL Oral 4x Daily    metoprolol tartrate  25 mg Oral BID    enoxaparin  40 mg Subcutaneous Daily    polyethylene glycol  17 g Oral Daily    aspirin  81 mg Oral Daily    bisacodyl  5 mg Oral Daily    sennosides-docusate sodium  1 tablet Oral BID    magnesium hydroxide  30 mL Oral Daily    ferrous sulfate  325 mg Oral BID WC    vitamin C  500 mg Oral BID    folic acid  1 mg Oral Daily    bisacodyl  10 mg Rectal Daily    insulin lispro  0-18 Units Subcutaneous TID WC    insulin lispro  0-9 Units Subcutaneous Nightly    sodium chloride flush  10 mL Intravenous BID    ipratropium-albuterol  1 ampule Inhalation 4x daily    docosanol   Topical 5x Daily    rosuvastatin  20 mg Oral Nightly    magnesium oxide  400 mg Oral Daily    pantoprazole  40 mg Oral Daily    tamsulosin  0.4 mg Oral Daily    budesonide  0.5 mg Nebulization BID    gabapentin  300 mg Oral BID     Continuous Infusions:   dextrose      sodium chloride      sodium chloride       PRN Meds:.glucose, dextrose, glucagon (rDNA), dextrose, sodium chloride, Mineral Oil-Hydrophil Petrolat **AND** Mineral Oil-Hydrophil Petrolat, sodium chloride, acetaminophen, HYDROcodone 5 mg - acetaminophen **OR** HYDROcodone 5 mg - acetaminophen, potassium chloride, ondansetron, diphenhydrAMINE, benzocaine-menthol    Allergies: Shrimp (diagnostic) and Valium [diazepam]      Labs:   Recent Labs     01/27/21  0553 01/28/21  0450 01/28/21  1021 01/29/21  0530   WBC 8.9 7.8  --  7.0   HGB 8.2* 7.8* 9.3* 9.0*   HCT 25.6* 25.5* 28.4* 28.1*   MCV 93.8 94.8  --  90.1    164  --  181       Labs:   Recent Labs     01/27/21  0553 01/28/21  0450 01/29/21  0530    134 138   K 4.2 4.1 4.0   CL 96* 96* 98   CO2 35* 30* 33*   BUN 28* 21 15   CREATININE 1.1 0.9 0.9       Labs: No results for input(s): CKTOTAL, CKMB, CKMBINDEX, TROPONINI in the last 72 hours.     Labs: No results for input(s): BNP in the last 72 hours. Labs: No results for input(s): CHOL, HDL, TRIG in the last 72 hours. Invalid input(s): CHOLHDLR, LDLCALCU    Labs: No results for input(s): PROT, INR in the last 72 hours. Review of systems: No reported significant weight gain or weight loss. no dysuria or frequency, no dizziness, falls or trauma, no change in bowel or bladder habits, no hematochezia, hemoptysis or hematuria. No fevers, chills, nausea or vomiting reported. No significant wheezing or sputum production. No headache or visual changes. The remainder of the 10 review of systems otherwise negative. Exam      General: Patient comfortable in no distress and currently denies any chest pain. HEENT: Face symmetrical and no apparent cranial nerve deficit. Neck: No jugular venous distention, carotid bruit or thyromegaly. Lungs: Clear bilaterally without rales, wheezes or dullness. Cardiac: Regular rate and rhythm, no S3, S4, no rub or gallop. Abdomen: No rebound or guarding, no hepatosplenomegaly. Extremities: Without significant clubbing , cyanosis, or edema. Neuro:  No focal motor or sensory deficit apparent. Skin: No petechiae, no significant bruising. Assessment: See plan below        Plan: #1 CAD status post CABG January 22, 2021. Postoperative day #7. Advance activity as tolerated and he will likely need rehab post discharge. Would like to continue aspirin 81 mg daily especially with recent non-ST elevation myocardial infarction and CABG but recognizing anemia and findings of EGD today showed esophageal ulcer and gastric ulcer, neither 1 bleeding. #2 hypertension and blood pressure reasonable. #3 anemia and follow-up blood count. Today hemoglobin 9.0, platelet count 462,045 white count 7.0.   Underwent EGD and found to have a proximal gastric ulcer superficial about 0.5 cm nonbleeding, distal esophageal ulcer about one third circumference of the esophagus no active bleeding. Currently receiving aspirin 81 mg daily. Plan proton pump inhibitor and Carafate. #4 diabetes and blood sugar and diabetic education reviewed. Can follow-up at [facility] cardiology as an outpatient in 1 month.     Electronically signed by Rohini Muse MD on 1/29/2021 at 3:13 PM

## 2021-01-29 NOTE — OP NOTE
EGD Op Note    DATE OF PROCEDURE: 1/29/2021     SURGEON: Radha Cat MD    PREOPERATIVE DIAGNOSIS: anemia     POSTOPERATIVE DIAGNOSIS: Same, gastric ulceration, and distal esophageal ulcer     OPERATION: Procedure(s):  EGD DIAGNOSTIC ONLY    ANESTHESIA: Local monitored anesthesia. ESTIMATED BLOOD LOSS: minimal    COMPLICATIONS: None. SPECIMENS:  * No specimens in log *    HISTORY: The patient is a 68y.o. year old male with history of above preop diagnosis. I recommended esophagogastroduodenoscopy with possible biopsy and I explained the risk, benefits, expected outcome, and alternatives to the procedure. Risks included but are not limited to bleeding, infection, respiratory distress, hypotension, and perforation of the esophagus, stomach, or duodenum. Patient understands and is in agreement. PROCEDURE: The patient was given IV conscious sedation per anesthesia. The patient was given supplemental oxygen by nasal cannula. The gastroscope was inserted orally and advanced under direct vision through the esophagus, through the stomach, through the pylorus, and into the duodenum. Findings:  Duodenum:     Descending: wnl     Bulb: wnl     Stomach:    Antrum: normal     Body: normal     Fundus: proximal gastric ulcer superficial about 0.5 cm non bleeding     Esophagus: distal esophageal ulcer about 1/3 circumference of the esophagus no active bleeding   GE junction: 40 cm from incisors wnl     Larynx: normal     The scope was removed and the patient tolerated the procedure well. IMPRESSION/PLAN:   1. PPI, and carafate, ok for diet,.         Radha Cat MD  01/29/21  2:01 PM

## 2021-01-29 NOTE — PATIENT CARE CONFERENCE
P Quality Flow/Interdisciplinary Rounds Progress Note        Quality Flow Rounds held on January 29, 2021    Disciplines Attending:  Bedside Nurse, ,  and Nursing Unit Anastasiya Martinez was admitted on 1/15/2021 11:51 AM    Anticipated Discharge Date:  Expected Discharge Date: 01/29/21    Disposition:    Denny Score:  Denny Scale Score: 17    Readmission Risk              Risk of Unplanned Readmission:        13           Discussed patient goal for the day, patient clinical progression, and barriers to discharge.   The following Goal(s) of the Day/Commitment(s) have been identified: NPO- For EGD today, IV Lasx 20mg x1    [facility]  January 29, 2021

## 2021-01-29 NOTE — PROGRESS NOTES
CTS PA notified low urine output in cota catheter. Patient has had two bladder spasms and leaked a large amount of urine around the catheter each time     Order obtained to irrigate catheter. Catheter irrigated, and urine now flowing freely into catheter.

## 2021-01-29 NOTE — PROGRESS NOTES
ezpap  --encourage C&DB, SMI  --currently on 2L O2/NC  --pulmonary following   -- Abx per pulmonary   -- Lasix given yesterday        5. Constipation/abdominal distension  --+ BM yesterday  -- Gen surgery following   -- amio improved today        6. DM 2  --hgA1c 6.6  --add home glipizide once improvement in oral intake - not yet  --continue high scale SSI  --recent steroid use may be adding to hyperglycemia  -- will add back nighttime lantus          7. Post operative deconditioning  --Increase activity as tolerated  --PT/OT  --currently ambulating 200ft        8. Cough with swallowing  - bedside swallow eval  passed  - zosyn started per pulm for sputum- agree especially given his recent emesis - aspiration protection        9. Post op nausea/emesis x 1/ generalized abd pain  - cont zofran  - check amylase, lipase, LFTs - all normal; amylase just slightly elevated   - STOP AMIO      10. Post op urinary retention  - cota resinserted 1/25  - on flomax  - cont cota until more ambulatory    11.  Melena  -gen surg notified  - STOP plavix  - for EGD today        DVT prophylaxis:  --continue bilateral knee high NICOLASA hose  --continue PCDs  --continue progressive ambulation  --on lovenox for dvt prophylaxis and continue knee high NICOLASA hose/pcds/progressive ambulation        Dispo: will need rehab; pt has been accepted to a facility and precert is not needed; will go when ready- likely over weekend           This patient's case and care plan was discussed with the attending surgeon

## 2021-01-29 NOTE — PROGRESS NOTES
Comprehensive Nutrition Assessment    Type and Reason for Visit:  Reassess    Nutrition Recommendations/Plan: Advance diet when medically appropriate. Recommend and start Glucerna supplement TID and Cameron wound healing supplement BID (when diet advances) to help meet increased nutritional needs and to help assist with proper surgical wound healing. Nutrition Assessment:  Pt currently NPO for planned EGD ; pt at nutritional risk d/t overall decreased po intake x 2 weeks since admission (1-25%) ; noted N/V following surgery ; patient at nutritional risk d/t increased needs from surgical wound healing (s/p CABG x 3 on 1/22) ; hx of COPD and DM ; recent COVID-19 infection ; will re-start ONS when diet advances    Malnutrition Assessment:  Malnutrition Status: At risk for malnutrition (Comment)    Context:  Acute Illness     Findings of the 6 clinical characteristics of malnutrition:  Energy Intake:  1 - 75% or less of estimated energy requirements for 7 or more days  Weight Loss:  Unable to assess(d/t lack of weight history)     Body Fat Loss:  No significant body fat loss     Muscle Mass Loss:  No significant muscle mass loss    Fluid Accumulation:  No significant fluid accumulation     Strength:  Not Performed    Estimated Daily Nutrient Needs:  Energy (kcal):  5285-8317 (REE 1565 x 1.2 SF); Weight Used for Energy Requirements:  Current     Protein (g):  100-120 (1.5-1.8g/kg IBW);  Weight Used for Protein Requirements:  Ideal        Fluid (ml/day):  9711-8682; Method Used for Fluid Requirements:  1 ml/kcal      Nutrition Related Findings:  I&Os WNL, 1+ edema, active BS, rounded abd, constipation, missing partial dentures, A&O x 3, erosion to buttocks      Wounds:  Multiple, Surgical Incision, Skin Tears(Incisions x 5 ; skin tear x 1)       Current Nutrition Therapies:    Diet NPO, After Midnight Exceptions are: Sips of Water with Meds    Anthropometric Measures:  · Height: 5' 7\" (170.2 cm)  · Current Body Weight: 195 lb (88.5 kg)(1/29, standing scale)   · Admission Body Weight: 189 lb (85.7 kg)(1/22, standing scale)    · Usual Body Weight: (UTO ; no weights available in EMR hx from previous encounters)     · Ideal Body Weight: 148 lbs; % Ideal Body Weight 131.8 %   · BMI: 30.5  · BMI Categories: Obese Class 1 (BMI 30.0-34. 9)       Nutrition Diagnosis:   · Increased nutrient needs related to increase demand for energy/nutrients as evidenced by wounds(surgical)      Nutrition Interventions:   Food and/or Nutrient Delivery:  Continue NPO  Nutrition Education/Counseling:  Education completed   Coordination of Nutrition Care:  Continue to monitor while inpatient    Goals:  Advance diet when medically appropriate       Nutrition Monitoring and Evaluation:   Behavioral-Environmental Outcomes:  Beliefs and Attitutes   Food/Nutrient Intake Outcomes:  Diet Advancement/Tolerance  Physical Signs/Symptoms Outcomes:  Biochemical Data, Chewing or Swallowing, GI Status, Fluid Status or Edema, Hemodynamic Status, Meal Time Behavior, Nutrition Focused Physical Findings, Skin, Weight, Constipation     Discharge Planning:     Too soon to determine     Electronically signed by Charlotte Prado RD, LD on 1/29/21 at 1:38 PM EST    Contact: [phone]

## 2021-01-30 LAB
ANION GAP SERPL CALCULATED.3IONS-SCNC: 7 MMOL/L (ref 7–16)
BUN BLDV-MCNC: 13 MG/DL (ref 8–23)
CALCIUM SERPL-MCNC: 7.6 MG/DL (ref 8.6–10.2)
CHLORIDE BLD-SCNC: 100 MMOL/L (ref 98–107)
CO2: 31 MMOL/L (ref 22–29)
CREAT SERPL-MCNC: 0.8 MG/DL (ref 0.7–1.2)
GFR AFRICAN AMERICAN: >60
GFR NON-AFRICAN AMERICAN: >60 ML/MIN/1.73
GLUCOSE BLD-MCNC: 78 MG/DL (ref 74–99)
HCT VFR BLD CALC: 28.6 % (ref 37–54)
HEMOGLOBIN: 9.1 G/DL (ref 12.5–16.5)
MCH RBC QN AUTO: 28.8 PG (ref 26–35)
MCHC RBC AUTO-ENTMCNC: 31.8 % (ref 32–34.5)
MCV RBC AUTO: 90.5 FL (ref 80–99.9)
METER GLUCOSE: 120 MG/DL (ref 74–99)
METER GLUCOSE: 136 MG/DL (ref 74–99)
METER GLUCOSE: 156 MG/DL (ref 74–99)
METER GLUCOSE: 87 MG/DL (ref 74–99)
PDW BLD-RTO: 18.5 FL (ref 11.5–15)
PLATELET # BLD: 211 E9/L (ref 130–450)
PMV BLD AUTO: 9.3 FL (ref 7–12)
POTASSIUM SERPL-SCNC: 3.7 MMOL/L (ref 3.5–5)
RBC # BLD: 3.16 E12/L (ref 3.8–5.8)
SARS-COV-2, NAAT: NOT DETECTED
SODIUM BLD-SCNC: 138 MMOL/L (ref 132–146)
WBC # BLD: 6.4 E9/L (ref 4.5–11.5)

## 2021-01-30 PROCEDURE — 36415 COLL VENOUS BLD VENIPUNCTURE: CPT

## 2021-01-30 PROCEDURE — 85027 COMPLETE CBC AUTOMATED: CPT

## 2021-01-30 PROCEDURE — 6370000000 HC RX 637 (ALT 250 FOR IP): Performed by: SURGERY

## 2021-01-30 PROCEDURE — 6370000000 HC RX 637 (ALT 250 FOR IP): Performed by: PHYSICIAN ASSISTANT

## 2021-01-30 PROCEDURE — 99232 SBSQ HOSP IP/OBS MODERATE 35: CPT | Performed by: SURGERY

## 2021-01-30 PROCEDURE — 2140000000 HC CCU INTERMEDIATE R&B

## 2021-01-30 PROCEDURE — 2700000000 HC OXYGEN THERAPY PER DAY

## 2021-01-30 PROCEDURE — 6360000002 HC RX W HCPCS: Performed by: SURGERY

## 2021-01-30 PROCEDURE — 80048 BASIC METABOLIC PNL TOTAL CA: CPT

## 2021-01-30 PROCEDURE — 99232 SBSQ HOSP IP/OBS MODERATE 35: CPT | Performed by: INTERNAL MEDICINE

## 2021-01-30 PROCEDURE — 2580000003 HC RX 258: Performed by: SURGERY

## 2021-01-30 PROCEDURE — 93798 PHYS/QHP OP CAR RHAB W/ECG: CPT

## 2021-01-30 PROCEDURE — 82962 GLUCOSE BLOOD TEST: CPT

## 2021-01-30 PROCEDURE — U0002 COVID-19 LAB TEST NON-CDC: HCPCS

## 2021-01-30 PROCEDURE — 94660 CPAP INITIATION&MGMT: CPT

## 2021-01-30 RX ORDER — ROSUVASTATIN CALCIUM 20 MG/1
20 TABLET, COATED ORAL NIGHTLY
Qty: 30 TABLET | Refills: 3
Start: 2021-01-30 | End: 2021-12-27 | Stop reason: SDUPTHER

## 2021-01-30 RX ORDER — BUDESONIDE 0.5 MG/2ML
0.5 INHALANT ORAL 2 TIMES DAILY
Qty: 60 AMPULE | Refills: 3
Start: 2021-01-30 | End: 2022-04-01

## 2021-01-30 RX ORDER — ASCORBIC ACID 500 MG
500 TABLET ORAL 2 TIMES DAILY
Qty: 60 TABLET | Refills: 0
Start: 2021-01-30 | End: 2021-07-13

## 2021-01-30 RX ORDER — MINERAL OIL/HYDROPHIL PETROLAT
1 OINTMENT (GRAM) TOPICAL 2 TIMES DAILY
Qty: 1 G | Refills: 0
Start: 2021-01-30 | End: 2021-07-13

## 2021-01-30 RX ORDER — HYDROCODONE BITARTRATE AND ACETAMINOPHEN 5; 325 MG/1; MG/1
1 TABLET ORAL EVERY 8 HOURS PRN
Qty: 21 TABLET | Refills: 0 | Status: SHIPPED | OUTPATIENT
Start: 2021-01-30 | End: 2021-02-06

## 2021-01-30 RX ORDER — PANTOPRAZOLE SODIUM 40 MG/1
40 TABLET, DELAYED RELEASE ORAL DAILY
Qty: 30 TABLET | Refills: 3
Start: 2021-01-31 | End: 2021-07-13

## 2021-01-30 RX ORDER — FOLIC ACID 1 MG/1
1 TABLET ORAL DAILY
Qty: 30 TABLET | Refills: 0
Start: 2021-01-31 | End: 2021-07-13

## 2021-01-30 RX ORDER — SENNA AND DOCUSATE SODIUM 50; 8.6 MG/1; MG/1
1 TABLET, FILM COATED ORAL 2 TIMES DAILY
Qty: 20 TABLET | Refills: 0
Start: 2021-01-30

## 2021-01-30 RX ORDER — DOCUSATE SODIUM 100 MG/1
100 CAPSULE, LIQUID FILLED ORAL 2 TIMES DAILY PRN
Qty: 60 CAPSULE | Refills: 0
Start: 2021-01-30 | End: 2021-03-01

## 2021-01-30 RX ORDER — ASPIRIN 81 MG/1
81 TABLET ORAL DAILY
Qty: 30 TABLET | Refills: 3
Start: 2021-01-31

## 2021-01-30 RX ORDER — FERROUS SULFATE 325(65) MG
325 TABLET ORAL 2 TIMES DAILY WITH MEALS
Qty: 60 TABLET | Refills: 0
Start: 2021-01-30 | End: 2021-07-13

## 2021-01-30 RX ORDER — SUCRALFATE 1 G/1
1 TABLET ORAL 4 TIMES DAILY
Qty: 120 TABLET | Refills: 3
Start: 2021-01-30 | End: 2021-07-13

## 2021-01-30 RX ORDER — MINERAL OIL/HYDROPHIL PETROLAT
1 OINTMENT (GRAM) TOPICAL 3 TIMES DAILY PRN
Qty: 1 G | Refills: 0
Start: 2021-01-30 | End: 2021-07-13

## 2021-01-30 RX ORDER — DOCOSANOL 100 MG/G
1 CREAM TOPICAL
Qty: 1 TUBE | Refills: 0
Start: 2021-01-30 | End: 2021-02-09

## 2021-01-30 RX ORDER — TAMSULOSIN HYDROCHLORIDE 0.4 MG/1
0.4 CAPSULE ORAL DAILY
Qty: 14 CAPSULE | Refills: 0 | Status: SHIPPED | OUTPATIENT
Start: 2021-01-31 | End: 2021-07-13

## 2021-01-30 RX ADMIN — NYSTATIN 500000 UNITS: 100000 SUSPENSION ORAL at 21:07

## 2021-01-30 RX ADMIN — FERROUS SULFATE TAB 325 MG (65 MG ELEMENTAL FE) 325 MG: 325 (65 FE) TAB at 08:44

## 2021-01-30 RX ADMIN — INSULIN GLARGINE 10 UNITS: 100 INJECTION, SOLUTION SUBCUTANEOUS at 21:14

## 2021-01-30 RX ADMIN — TAMSULOSIN HYDROCHLORIDE 0.4 MG: 0.4 CAPSULE ORAL at 08:44

## 2021-01-30 RX ADMIN — OXYCODONE HYDROCHLORIDE AND ACETAMINOPHEN 500 MG: 500 TABLET ORAL at 21:07

## 2021-01-30 RX ADMIN — SUCRALFATE 1 G: 1 TABLET ORAL at 06:20

## 2021-01-30 RX ADMIN — SUCRALFATE 1 G: 1 TABLET ORAL at 17:31

## 2021-01-30 RX ADMIN — Medication: at 21:07

## 2021-01-30 RX ADMIN — BISACODYL 5 MG: 5 TABLET, COATED ORAL at 08:44

## 2021-01-30 RX ADMIN — NYSTATIN 500000 UNITS: 100000 SUSPENSION ORAL at 13:47

## 2021-01-30 RX ADMIN — INSULIN LISPRO 3 UNITS: 100 INJECTION, SOLUTION INTRAVENOUS; SUBCUTANEOUS at 11:25

## 2021-01-30 RX ADMIN — ASPIRIN 81 MG: 81 TABLET, COATED ORAL at 08:43

## 2021-01-30 RX ADMIN — DOCUSATE SODIUM 50 MG AND SENNOSIDES 8.6 MG 1 TABLET: 8.6; 5 TABLET, FILM COATED ORAL at 08:43

## 2021-01-30 RX ADMIN — SUCRALFATE 1 G: 1 TABLET ORAL at 11:26

## 2021-01-30 RX ADMIN — METOPROLOL TARTRATE 25 MG: 25 TABLET, FILM COATED ORAL at 08:44

## 2021-01-30 RX ADMIN — DOCUSATE SODIUM 50 MG AND SENNOSIDES 8.6 MG 1 TABLET: 8.6; 5 TABLET, FILM COATED ORAL at 21:07

## 2021-01-30 RX ADMIN — ROSUVASTATIN 20 MG: 20 TABLET, FILM COATED ORAL at 21:07

## 2021-01-30 RX ADMIN — PANTOPRAZOLE SODIUM 40 MG: 40 TABLET, DELAYED RELEASE ORAL at 08:43

## 2021-01-30 RX ADMIN — NYSTATIN 500000 UNITS: 100000 SUSPENSION ORAL at 08:48

## 2021-01-30 RX ADMIN — ANTI-FUNGAL POWDER MICONAZOLE NITRATE TALC FREE: 1.42 POWDER TOPICAL at 08:48

## 2021-01-30 RX ADMIN — GABAPENTIN 300 MG: 300 CAPSULE ORAL at 08:44

## 2021-01-30 RX ADMIN — FERROUS SULFATE TAB 325 MG (65 MG ELEMENTAL FE) 325 MG: 325 (65 FE) TAB at 16:10

## 2021-01-30 RX ADMIN — ENOXAPARIN SODIUM 40 MG: 40 INJECTION SUBCUTANEOUS at 08:48

## 2021-01-30 RX ADMIN — Medication 10 ML: at 21:08

## 2021-01-30 RX ADMIN — GABAPENTIN 300 MG: 300 CAPSULE ORAL at 21:07

## 2021-01-30 RX ADMIN — MAGNESIUM GLUCONATE 500 MG ORAL TABLET 400 MG: 500 TABLET ORAL at 08:44

## 2021-01-30 RX ADMIN — MAGNESIUM HYDROXIDE 30 ML: 2400 SUSPENSION ORAL at 08:44

## 2021-01-30 RX ADMIN — OXYCODONE HYDROCHLORIDE AND ACETAMINOPHEN 500 MG: 500 TABLET ORAL at 08:44

## 2021-01-30 RX ADMIN — Medication 10 ML: at 08:44

## 2021-01-30 RX ADMIN — POTASSIUM CHLORIDE 40 MEQ: 1500 TABLET, EXTENDED RELEASE ORAL at 08:53

## 2021-01-30 RX ADMIN — FOLIC ACID 1 MG: 1 TABLET ORAL at 08:43

## 2021-01-30 RX ADMIN — ANTI-FUNGAL POWDER MICONAZOLE NITRATE TALC FREE: 1.42 POWDER TOPICAL at 21:08

## 2021-01-30 RX ADMIN — Medication: at 08:48

## 2021-01-30 RX ADMIN — METOPROLOL TARTRATE 12.5 MG: 25 TABLET, FILM COATED ORAL at 21:07

## 2021-01-30 RX ADMIN — NYSTATIN 500000 UNITS: 100000 SUSPENSION ORAL at 16:10

## 2021-01-30 ASSESSMENT — PAIN SCALES - GENERAL
PAINLEVEL_OUTOF10: 0

## 2021-01-30 NOTE — PROGRESS NOTES
[place]  Department of Internal Medicine  Division of Pulmonary, Critical Care and Sleep Medicine  Progress Note  Tee Gaona DO, [facility], Gildardo Fletcher MD, [facility]    Patient: Shelley Tam  MRN: [id number]  : 1940    Encounter Time: 2:18 PM     Date of Admission: 1/15/2021 11:51 AM    Primary Care Physician: WILVER Staples - NP    Reason for Consultation: Pre Op assesssment     SUBJECTIVE:  gen surg notified  - STOP plavix  - EGD done- gastrtis and ulcer found  - per gen surge PPI, carafate and ok for diet  POD#8     OBJECTIVE:     PHYSICAL EXAM:   VITALS:   Vitals:    21 0538 21 0735 21 0800 21 1124   BP:   104/60 (!) 100/59   Pulse:   83 76   Resp:   18 16   Temp:   97.8 °F (36.6 °C) 96.8 °F (36 °C)   TempSrc:   Infrared Temporal   SpO2:  94% 98%    Weight: 194 lb 3.2 oz (88.1 kg)      Height:            Intake/Output Summary (Last 24 hours) at 2021 1418  Last data filed at 2021 1355  Gross per 24 hour   Intake 370 ml   Output 750 ml   Net -380 ml        CONSTITUTIONAL:   Alert, NAD  SKIN:     No rash, Skin discolorationm fair skin with AK  HEENT:     EOMI, MMM, No thrush  NECK:    No bruits, No JVP apprechiated  CV:      Sinus,  No gallops  PULMONARY:   Couse BS,  No Wheezing, No Rales, No Rhonchi      No noted egophony  ABDOMEN:     Soft, non-tender. BS normal. No R/R/G  EXT:    No deformities . No clubbing. Trace lower extremity edema,   PULSE:   Palpable.   PSYCHIATRIC:  Seems appropriate, No acute psycosis  MS:    No fractures, No gross weakness  NEUROLOGIC:   Non-focal     DATA: IMAGING & TESTING:     LABORATORY TESTS:    CBC:   Lab Results   Component Value Date    WBC 6.4 2021    RBC 3.16 2021    HGB 9.1 2021    HCT 28.6 2021    MCV 90.5 2021    MCH 28.8 2021    MCHC 31.8 2021    RDW 18.5 2021     2021    MPV 9.3 2021     CMP: Lab Results   Component Value Date     01/30/2021    K 3.7 01/30/2021    K 4.3 01/16/2021     01/30/2021    CO2 31 01/30/2021    BUN 13 01/30/2021    CREATININE 0.8 01/30/2021    GFRAA >60 01/30/2021    LABGLOM >60 01/30/2021    GLUCOSE 78 01/30/2021    PROT 5.5 01/26/2021    LABALBU 3.3 01/26/2021    CALCIUM 7.6 01/30/2021    BILITOT 0.7 01/26/2021    ALKPHOS 104 01/26/2021    AST 39 01/26/2021    ALT 17 01/26/2021     Calcium:    Lab Results   Component Value Date    CALCIUM 7.6 01/30/2021        PRO-BNP:   Lab Results   Component Value Date    PROBNP 4,111 (H) 01/24/2021      ABGs:   Lab Results   Component Value Date    PH 7.332 01/22/2021    PO2 65.7 01/22/2021    PCO2 40.9 01/22/2021     Hemoglobin A1C: No components found for: HGBA1C    IMAGING:  Imaging tests were completed and reviewed and discussed radiology and care team involved and reveals     CHEST RADIOGRAM            Pulmonary function test: Pulmonary function test revealed forced vital capacity of 1.4 at 6 liters, 41% of predicted with an FEV1 of 1.5 at 7 liters, 44% ofpredicted with an FEV1/FVC ratio of 80%. Midflow rates are 60% of predicted with maximum voluntary ventilation at 20 liters per minute, 88% of predicted.     Static lung volumes with total vital capacity of 1.48 liters, 41% of predicted. Inspiratory capacity is 1.36 liters, 45% of predicted. Expiratory reserve volume 0.13 liters, 73% of predicted. The diffusion capacity is 17.17 mL/min per mmHg, which is 25% of predicted. Severely restricted lung physiology with noted moderate-to-severe loss in gas transfer. Echo Complete  TTE procedure:Echo Complete W/Doppler & Color Flow. Procedure Date Date: 01/16/2021 Start: 09:28 AM Study Location: Portable Technical Quality: Adequate visualization Indications:LV function. Patient Status: Routine Height: 67 inches Weight: 195 pounds BSA: 2 m^2 BMI: 30.54 kg/m^2  Findings   Left Ventricle  Left ventricle is normal in size. Normal left ventricular wall thickness. Low normal ejection fraction. Ejection fraction is visually estimated at 55%. There is doppler evidence of stage I diastolic dysfunction. Right Ventricle  Mildly dilated right ventricle. Right ventricle global systolic function is normal.   Left Atrium  Normal sized left atrium. Interatrial septum appears intact. Right Atrium  Normal right atrium size. Mitral Valve  Structurally normal mitral valve. No evidence of mitral valve stenosis. Mild mitral regurgitation is present. Tricuspid Valve  The tricuspid valve appears structurally normal.  Physiologic and/or trace tricuspid regurgitation. Pulmonary hypertension is not present. Aortic Valve  Individual aortic valve leaflets are not clearly visualized. No hemodynamically significant aortic stenosis is present. Mild aortic regurgitation is noted. Pulmonic Valve  The pulmonic valve was not well visualized. No evidence of pulmonic valve stenosis. No evidence of any pulmonic regurgitation. Pericardial Effusion  No evidence of pericardial effusion. Pleural Effusion  No evidence of pleural effusion. Aorta  Aortic root dimension within normal limits. Ct Chest Wo Contrast    Result Date: 1/16/2021  FINDINGS: The heart is normal in size. Atherosclerotic calcifications are associated with the coronary arteries. No pericardial effusion. Ascending thoracic aorta measures 3.2 cm in diameter. Descending thoracic aorta measures 2.4 cm in diameter. There is a combined origin of brachiocephalic trunk and left common carotid artery at thoracic aortic arch. No mediastinal fluid collections. No mediastinal or hilar lymphadenopathy. There are no airspace opacities. There is a loculated right pleural effusion with adjacent subsegmental atelectasis. No pneumothorax. View of the upper abdomen shows normal bilateral adrenal glands.     1.  Loculated right pleural effusion with adjacent subsegmental atelectasis. 2.  No evidence of pneumonia. 3.  Atherosclerotic calcifications associated with the coronary arteries. Us Carotid Artery Bilateral  Result Date: 1/17/2021  Atherosclerotic disease. No hemodynamically significant stenosis is identified Estimated stenosis by NASCET criteria in the proximal right carotid artery is between 0% and 49%. Estimated stenosis by NASCET criteria in the proximal left carotid artery is between 0% and 49%. Assessment: Mayte Moreno is a 80-year-old gentleman seen for preoperative assessment with abnormal pulmonary assessment revealing severe restrictive physiologic defect with loss and diffusion capacity. The undergo bypass surgery in the next few days and we were asked to comment on preoperative risk. On 1/22/2021 underwent 1. Coronary artery bypass grafting x3 using left internal mammary artery to the left anterior descending artery, reverse saphenous vein graft to the ramus intermedius, and reverse saphenous vein graft to the distal right coronary artery. 2.  Lower extremity endoscopic vein harvest. 3.  Rigid sternal fixation with a KLS plating system. 4.  22 modifier. 1. Restrictive lung physiology  2. Acute Respiratory failure    3. MV CAD  4. Chronic pain  5. Post operative decondition  6. Anemai post op - transfusion  7. Sputum mixes  8. HLD  9. HTN  10. Loculated pleural effusion    Plan:   1. Continue with bronchodilators  2. Sputum samples - negative  3. Serial abd exams, minimize narcs - improved  4. Bowel prep noted  5. Stop zosyn    6. PEP valve  7. Encourage ICS and ambuliation  8. Oxygen support keep >90%  9. CPAP at night   10. ICS @ 2 hours to prevent atelectasis  11. Continue PT   12.  Diuresis today      Flor Burt DO, MPH, FCCP, Sangeetha Echevarria

## 2021-01-30 NOTE — PROGRESS NOTES
General Surgery Progress Note:    CC: n/v     S: passing flatus and moving his bowels not much pain,       Objective:  @BP (!) 100/59   Pulse 76   Temp 96.8 °F (36 °C) (Temporal)   Resp 16   Ht 5' 7\" (1.702 m)   Wt 194 lb 3.2 oz (88.1 kg)   SpO2 93% Comment: 94 before, 93 during, 94 after walk  BMI 30.42 kg/m² @    Physical -     Gen: NAD  Resp:  no resp distress fairly clear b/l   CV: Reg Rate no extra heart sounds,   Abd: soft mild upper abd tenderness    EXT NVI,     Assessment/Plan: N/V s/p CABG with GIB likely from distal esophageal ulceration     - PPI carafate x 1 month, should be fine after that. - ok for anitplatelet meds.    - HH stable,  - surgery to s/o call if anything changes,     Henrique Welsh MD

## 2021-01-30 NOTE — PROGRESS NOTES
Pt refusing to ambulate to chair for dinner again despite reattempts at pt education and the importance of ambulation after surgery. Pt states he \"doesn't care. \"

## 2021-01-30 NOTE — CARE COORDINATION
[id number] Sangita regarding discharge [phone]. They do not have admissions items left for admission this weekend. They are reaching out to their admission worker and will let me know if he can admit today. [address] Duarte Sangita with [facility] at [facility], she spoke with katiuska in admissions, they cannot accept this patient until Monday because \"they don't have everything they need? \" Discharge on hold until facility can accept. For questions I can be reached at [phone].  [place], [place]

## 2021-01-30 NOTE — PROGRESS NOTES
than or equal to 92%  --continue duonebs with ezpap  --encourage C&DB, SMI  --currently on 2L O2/NC  --pulmonary following   -- Abx per pulmonary -STOP zosyn per pulm  -- Lasix given 1/28        5. Constipation/abdominal distension  --+ BM yesterday  -- Gen surgery following         6. DM 2  --hgA1c 6.6  --add home glipizide once improvement in oral intake - not yet  --continue high scale SSI  --recent steroid use may be adding to hyperglycemia  -- will add back nighttime lantus          7. Post operative deconditioning  --Increase activity as tolerated  --PT/OT  --currently ambulating 200ft        8. Cough with swallowing  - bedside swallow eval  passed  - zosyn started per pulm for sputum- agree especially given his recent emesis - aspiration protection- change to augmentin at DC        9. Post op nausea/emesis x 1/ generalized abd pain  - cont zofran  - check amylase, lipase, LFTs - all normal; amylase just slightly elevated   - STOP AMIO      10. Post op urinary retention  - cota resinserted 1/25  - on flomax  - cont cota until more ambulatory- ok to DC with cota to rehab per Dr Sloan Strickland     11.  Melena  -gen surg notified  - STOP plavix  - EGD done- gastrtis and ulcer found  - per gen surge PPI, carafate and ok for diet  - will cont to hold plavix        DVT prophylaxis:  --continue bilateral knee high NICOLASA hose  --continue PCDs  --continue progressive ambulation  --on lovenox for dvt prophylaxis and continue knee high NICOLASA hose/pcds/progressive ambulation        Dispo: rehab today     This patient's case and care plan was discussed with the attending surgeon

## 2021-01-30 NOTE — PROGRESS NOTES
Pt refusing to ambulate to chair for lunch. Pt educated on the importance of ambulation after surgery.

## 2021-01-30 NOTE — PROGRESS NOTES
Educated patient on importance of turns, wedges, and heel protectors. Patient still refuses, says he his comfortable.  Patient also refuses to sit up in chair at this time, asked to rest in bed until breakfast.

## 2021-01-30 NOTE — PLAN OF CARE
Problem: Falls - Risk of:  Goal: Will remain free from falls  Description: Will remain free from falls  1/30/2021 1712 by Navin Jenkins RN  Outcome: Met This Shift  1/30/2021 0456 by Joe Lin RN  Outcome: Met This Shift  Goal: Absence of physical injury  Description: Absence of physical injury  1/30/2021 1712 by Navin Jenkins RN  Outcome: Met This Shift  1/30/2021 0456 by Joe Lin RN  Outcome: Met This Shift     Problem: Infection - Surgical Site:  Goal: Will show no infection signs and symptoms  Description: Will show no infection signs and symptoms  Outcome: Met This Shift     Problem: Anxiety:  Goal: Level of anxiety will decrease  Description: Level of anxiety will decrease  1/30/2021 0456 by Joe Lin RN  Outcome: Met This Shift     Problem: Gas Exchange - Impaired:  Goal: Levels of oxygenation will improve  Description: Levels of oxygenation will improve  1/30/2021 1712 by Navin Jenkins RN  Outcome: Met This Shift  1/30/2021 0456 by Joe Lin RN  Outcome: Met This Shift     Problem: Pain:  Goal: Pain level will decrease  Description: Pain level will decrease  Outcome: Met This Shift  Goal: Control of acute pain  Description: Control of acute pain  Outcome: Met This Shift     Problem: Tissue Perfusion - Cardiopulmonary, Altered:  Goal: Will show no evidence of cardiac arrhythmias  Description: Will show no evidence of cardiac arrhythmias  Outcome: Met This Shift     Problem: Tobacco Use:  Goal: Will participate in inpatient tobacco-use cessation counseling  Description: Will participate in inpatient tobacco-use cessation counseling  Outcome: Met This Shift     Problem: Skin Integrity:  Goal: Will show no infection signs and symptoms  Description: Will show no infection signs and symptoms  Outcome: Met This Shift  Goal: Absence of new skin breakdown  Description: Absence of new skin breakdown  Outcome: Met This Shift     Problem: Pain:  Goal: Pain level will decrease  Description: Pain

## 2021-01-30 NOTE — PATIENT CARE CONFERENCE
P Quality Flow/Interdisciplinary Rounds Progress Note        Quality Flow Rounds held on January 30, 2021    Disciplines Attending:  Bedside Nurse and Nursing Unit Leadership    Nazanin Harkins was admitted on 1/15/2021 11:51 AM    Anticipated Discharge Date:  Expected Discharge Date: 01/29/21    Disposition:    Ednny Score:  Denny Scale Score: 19    Readmission Risk              Risk of Unplanned Readmission:        21           Discussed patient goal for the day, patient clinical progression, and barriers to discharge.   The following Goal(s) of the Day/Commitment(s) have been identified:  to be discharged to rehab      [facility]  January 30, 2021

## 2021-01-30 NOTE — PROGRESS NOTES
Patient educated on the importance of q2h turns, use of wedges, and heel protectors. Patient refused and stated\" this is the most comfortable I have been, please just leave me here\". I reinforced the importance, patient still refused will attempt later in shift.

## 2021-01-31 LAB
ANION GAP SERPL CALCULATED.3IONS-SCNC: 6 MMOL/L (ref 7–16)
BUN BLDV-MCNC: 11 MG/DL (ref 8–23)
CALCIUM SERPL-MCNC: 7.6 MG/DL (ref 8.6–10.2)
CHLORIDE BLD-SCNC: 101 MMOL/L (ref 98–107)
CO2: 30 MMOL/L (ref 22–29)
CREAT SERPL-MCNC: 0.8 MG/DL (ref 0.7–1.2)
GFR AFRICAN AMERICAN: >60
GFR NON-AFRICAN AMERICAN: >60 ML/MIN/1.73
GLUCOSE BLD-MCNC: 132 MG/DL (ref 74–99)
HCT VFR BLD CALC: 30.6 % (ref 37–54)
HEMOGLOBIN: 9.7 G/DL (ref 12.5–16.5)
MCH RBC QN AUTO: 28.9 PG (ref 26–35)
MCHC RBC AUTO-ENTMCNC: 31.7 % (ref 32–34.5)
MCV RBC AUTO: 91.1 FL (ref 80–99.9)
METER GLUCOSE: 121 MG/DL (ref 74–99)
METER GLUCOSE: 148 MG/DL (ref 74–99)
METER GLUCOSE: 260 MG/DL (ref 74–99)
METER GLUCOSE: 99 MG/DL (ref 74–99)
PDW BLD-RTO: 18 FL (ref 11.5–15)
PLATELET # BLD: 236 E9/L (ref 130–450)
PMV BLD AUTO: 9.1 FL (ref 7–12)
POTASSIUM SERPL-SCNC: 4 MMOL/L (ref 3.5–5)
RBC # BLD: 3.36 E12/L (ref 3.8–5.8)
SODIUM BLD-SCNC: 137 MMOL/L (ref 132–146)
WBC # BLD: 6.9 E9/L (ref 4.5–11.5)

## 2021-01-31 PROCEDURE — 2140000000 HC CCU INTERMEDIATE R&B

## 2021-01-31 PROCEDURE — 6370000000 HC RX 637 (ALT 250 FOR IP): Performed by: SURGERY

## 2021-01-31 PROCEDURE — 2580000003 HC RX 258: Performed by: SURGERY

## 2021-01-31 PROCEDURE — 85027 COMPLETE CBC AUTOMATED: CPT

## 2021-01-31 PROCEDURE — 93798 PHYS/QHP OP CAR RHAB W/ECG: CPT

## 2021-01-31 PROCEDURE — 36415 COLL VENOUS BLD VENIPUNCTURE: CPT

## 2021-01-31 PROCEDURE — 80048 BASIC METABOLIC PNL TOTAL CA: CPT

## 2021-01-31 PROCEDURE — 99232 SBSQ HOSP IP/OBS MODERATE 35: CPT | Performed by: INTERNAL MEDICINE

## 2021-01-31 PROCEDURE — 2700000000 HC OXYGEN THERAPY PER DAY

## 2021-01-31 PROCEDURE — 6370000000 HC RX 637 (ALT 250 FOR IP): Performed by: PHYSICIAN ASSISTANT

## 2021-01-31 PROCEDURE — 82962 GLUCOSE BLOOD TEST: CPT

## 2021-01-31 PROCEDURE — 6360000002 HC RX W HCPCS: Performed by: SURGERY

## 2021-01-31 PROCEDURE — 94660 CPAP INITIATION&MGMT: CPT

## 2021-01-31 RX ADMIN — BISACODYL 5 MG: 5 TABLET, COATED ORAL at 08:07

## 2021-01-31 RX ADMIN — PANTOPRAZOLE SODIUM 40 MG: 40 TABLET, DELAYED RELEASE ORAL at 08:07

## 2021-01-31 RX ADMIN — MAGNESIUM GLUCONATE 500 MG ORAL TABLET 400 MG: 500 TABLET ORAL at 08:07

## 2021-01-31 RX ADMIN — SUCRALFATE 1 G: 1 TABLET ORAL at 16:46

## 2021-01-31 RX ADMIN — SUCRALFATE 1 G: 1 TABLET ORAL at 00:35

## 2021-01-31 RX ADMIN — ANTI-FUNGAL POWDER MICONAZOLE NITRATE TALC FREE: 1.42 POWDER TOPICAL at 08:13

## 2021-01-31 RX ADMIN — Medication: at 20:57

## 2021-01-31 RX ADMIN — OXYCODONE HYDROCHLORIDE AND ACETAMINOPHEN 500 MG: 500 TABLET ORAL at 08:07

## 2021-01-31 RX ADMIN — ASPIRIN 81 MG: 81 TABLET, COATED ORAL at 08:08

## 2021-01-31 RX ADMIN — SUCRALFATE 1 G: 1 TABLET ORAL at 11:30

## 2021-01-31 RX ADMIN — SUCRALFATE 1 G: 1 TABLET ORAL at 05:32

## 2021-01-31 RX ADMIN — OXYCODONE HYDROCHLORIDE AND ACETAMINOPHEN 500 MG: 500 TABLET ORAL at 20:48

## 2021-01-31 RX ADMIN — NYSTATIN 500000 UNITS: 100000 SUSPENSION ORAL at 16:46

## 2021-01-31 RX ADMIN — METOPROLOL TARTRATE 12.5 MG: 25 TABLET, FILM COATED ORAL at 08:07

## 2021-01-31 RX ADMIN — ANTI-FUNGAL POWDER MICONAZOLE NITRATE TALC FREE: 1.42 POWDER TOPICAL at 20:57

## 2021-01-31 RX ADMIN — FOLIC ACID 1 MG: 1 TABLET ORAL at 08:07

## 2021-01-31 RX ADMIN — NYSTATIN 500000 UNITS: 100000 SUSPENSION ORAL at 12:43

## 2021-01-31 RX ADMIN — ENOXAPARIN SODIUM 40 MG: 40 INJECTION SUBCUTANEOUS at 08:06

## 2021-01-31 RX ADMIN — DOCUSATE SODIUM 50 MG AND SENNOSIDES 8.6 MG 1 TABLET: 8.6; 5 TABLET, FILM COATED ORAL at 08:06

## 2021-01-31 RX ADMIN — Medication 10 ML: at 08:08

## 2021-01-31 RX ADMIN — ROSUVASTATIN 20 MG: 20 TABLET, FILM COATED ORAL at 20:48

## 2021-01-31 RX ADMIN — Medication: at 08:14

## 2021-01-31 RX ADMIN — NYSTATIN 500000 UNITS: 100000 SUSPENSION ORAL at 20:48

## 2021-01-31 RX ADMIN — TAMSULOSIN HYDROCHLORIDE 0.4 MG: 0.4 CAPSULE ORAL at 08:07

## 2021-01-31 RX ADMIN — INSULIN GLARGINE 10 UNITS: 100 INJECTION, SOLUTION SUBCUTANEOUS at 20:46

## 2021-01-31 RX ADMIN — INSULIN LISPRO 2 UNITS: 100 INJECTION, SOLUTION INTRAVENOUS; SUBCUTANEOUS at 20:47

## 2021-01-31 RX ADMIN — FERROUS SULFATE TAB 325 MG (65 MG ELEMENTAL FE) 325 MG: 325 (65 FE) TAB at 16:46

## 2021-01-31 RX ADMIN — GABAPENTIN 300 MG: 300 CAPSULE ORAL at 08:06

## 2021-01-31 RX ADMIN — GABAPENTIN 300 MG: 300 CAPSULE ORAL at 21:01

## 2021-01-31 RX ADMIN — METOPROLOL TARTRATE 12.5 MG: 25 TABLET, FILM COATED ORAL at 20:47

## 2021-01-31 RX ADMIN — Medication 10 ML: at 20:48

## 2021-01-31 RX ADMIN — INSULIN LISPRO 9 UNITS: 100 INJECTION, SOLUTION INTRAVENOUS; SUBCUTANEOUS at 11:30

## 2021-01-31 RX ADMIN — FERROUS SULFATE TAB 325 MG (65 MG ELEMENTAL FE) 325 MG: 325 (65 FE) TAB at 08:12

## 2021-01-31 RX ADMIN — NYSTATIN 500000 UNITS: 100000 SUSPENSION ORAL at 08:07

## 2021-01-31 ASSESSMENT — PAIN SCALES - GENERAL
PAINLEVEL_OUTOF10: 0

## 2021-01-31 NOTE — PLAN OF CARE
Problem: Falls - Risk of:  Goal: Will remain free from falls  Description: Will remain free from falls  1/31/2021 0321 by Alycia Weir RN  Outcome: Met This Shift     Problem: Falls - Risk of:  Goal: Absence of physical injury  Description: Absence of physical injury  1/31/2021 0321 by Alycia Weir RN  Outcome: Met This Shift     Problem: Cardiac Output - Decreased:  Goal: Cardiac output within specified parameters  Description: Cardiac output within specified parameters  1/31/2021 0321 by Alycia Weir RN  Outcome: Met This Shift     Problem: Pain:  Goal: Pain level will decrease  Description: Pain level will decrease  1/31/2021 0321 by Alycia Weir RN  Outcome: Met This Shift     Problem: Pain:  Goal: Pain level will decrease  Description: Pain level will decrease  1/31/2021 0321 by Alycia Weir RN  Outcome: Met This Shift

## 2021-01-31 NOTE — PLAN OF CARE
Problem: Falls - Risk of:  Goal: Will remain free from falls  Description: Will remain free from falls  1/31/2021 1549 by Paul Marsh RN  Outcome: Met This Shift  1/31/2021 0321 by Paul Valenzuela RN  Outcome: Met This Shift  Goal: Absence of physical injury  Description: Absence of physical injury  1/31/2021 1549 by Paul Marsh RN  Outcome: Met This Shift  1/31/2021 0321 by Paul Valenzuela RN  Outcome: Met This Shift     Problem: Infection - Surgical Site:  Goal: Will show no infection signs and symptoms  Description: Will show no infection signs and symptoms  Outcome: Met This Shift     Problem: Pain:  Goal: Pain level will decrease  Description: Pain level will decrease  1/31/2021 1549 by Paul Marsh RN  Outcome: Met This Shift  1/31/2021 0321 by Paul Valenzuela RN  Outcome: Met This Shift  Goal: Control of acute pain  Description: Control of acute pain  Outcome: Met This Shift     Problem: Skin Integrity:  Goal: Will show no infection signs and symptoms  Description: Will show no infection signs and symptoms  Outcome: Met This Shift     Problem: Pain:  Goal: Pain level will decrease  Description: Pain level will decrease  1/31/2021 1549 by Paul Marsh RN  Outcome: Met This Shift  1/31/2021 0321 by Paul Valenzuela RN  Outcome: Met This Shift  Goal: Control of acute pain  Description: Control of acute pain  Outcome: Met This Shift     Problem: Discharge Planning:  Goal: Discharged to appropriate level of care  Description: Discharged to appropriate level of care  Outcome: Ongoing

## 2021-01-31 NOTE — PROGRESS NOTES
[place]  Department of Internal Medicine  Division of Pulmonary, Critical Care and Sleep Medicine  Progress Note  Winter Salinas DO, [facility], [place], [facility]    Patient: Joesfina Greco  MRN: [id number]  : 1940    Encounter Time: 2:16 PM     Date of Admission: 1/15/2021 11:51 AM    Primary Care Physician: WILVER Guillermo NP    Reason for Consultation: Pre Op assesssment     SUBJECTIVE:    POD#9  Plans to rehab tomorrow  On 2 liters  I/O are =      OBJECTIVE:     PHYSICAL EXAM:   VITALS:   Vitals:    21 0600 21 0712 21 0730 21 1120   BP:   134/63 (!) 99/56   Pulse:   89 95   Resp:   18 16   Temp:   97.8 °F (36.6 °C) 97.1 °F (36.2 °C)   TempSrc:   Temporal Temporal   SpO2:  90% 94%    Weight: 194 lb 6.4 oz (88.2 kg)      Height:            Intake/Output Summary (Last 24 hours) at 2021 1416  Last data filed at 2021 0806  Gross per 24 hour   Intake 490 ml   Output 475 ml   Net 15 ml        CONSTITUTIONAL:   Alert, NAD  SKIN:     No rash, Skin discolorationm fair skin with AK  HEENT:     EOMI, MMM, No thrush  NECK:    No bruits, No JVP apprechiated  CV:      Sinus,  No gallops  PULMONARY:   Couse BS,  No Wheezing, No Rales, No Rhonchi      No noted egophony  ABDOMEN:     Soft, non-tender. BS normal. No R/R/G  EXT:    No deformities . No clubbing. Trace lower extremity edema,   PULSE:   Palpable.   PSYCHIATRIC:  Seems appropriate, No acute psycosis  MS:    No fractures, No gross weakness  NEUROLOGIC:   Non-focal     DATA: IMAGING & TESTING:     LABORATORY TESTS:    CBC:   Lab Results   Component Value Date    WBC 6.9 2021    RBC 3.36 2021    HGB 9.7 2021    HCT 30.6 2021    MCV 91.1 2021    MCH 28.9 2021    MCHC 31.7 2021    RDW 18.0 2021     2021    MPV 9.1 2021     CMP:    Lab Results   Component Value Date     2021    K 4.0 01/31/2021    K 4.3 01/16/2021     01/31/2021    CO2 30 01/31/2021    BUN 11 01/31/2021    CREATININE 0.8 01/31/2021    GFRAA >60 01/31/2021    LABGLOM >60 01/31/2021    GLUCOSE 132 01/31/2021    PROT 5.5 01/26/2021    LABALBU 3.3 01/26/2021    CALCIUM 7.6 01/31/2021    BILITOT 0.7 01/26/2021    ALKPHOS 104 01/26/2021    AST 39 01/26/2021    ALT 17 01/26/2021     Calcium:    Lab Results   Component Value Date    CALCIUM 7.6 01/31/2021        PRO-BNP:   Lab Results   Component Value Date    PROBNP 4,111 (H) 01/24/2021      ABGs:   Lab Results   Component Value Date    PH 7.332 01/22/2021    PO2 65.7 01/22/2021    PCO2 40.9 01/22/2021     Hemoglobin A1C: No components found for: HGBA1C    IMAGING:  Imaging tests were completed and reviewed and discussed radiology and care team involved and reveals     CHEST RADIOGRAM            Pulmonary function test: Pulmonary function test revealed forced vital capacity of 1.4 at 6 liters, 41% of predicted with an FEV1 of 1.5 at 7 liters, 44% ofpredicted with an FEV1/FVC ratio of 80%. Midflow rates are 60% of predicted with maximum voluntary ventilation at 20 liters per minute, 88% of predicted.     Static lung volumes with total vital capacity of 1.48 liters, 41% of predicted. Inspiratory capacity is 1.36 liters, 45% of predicted. Expiratory reserve volume 0.13 liters, 73% of predicted. The diffusion capacity is 17.17 mL/min per mmHg, which is 25% of predicted. Severely restricted lung physiology with noted moderate-to-severe loss in gas transfer. Echo Complete  TTE procedure:Echo Complete W/Doppler & Color Flow. Procedure Date Date: 01/16/2021 Start: 09:28 AM Study Location: Portable Technical Quality: Adequate visualization Indications:LV function. Patient Status: Routine Height: 67 inches Weight: 195 pounds BSA: 2 m^2 BMI: 30.54 kg/m^2  Findings   Left Ventricle  Left ventricle is normal in size. Normal left ventricular wall thickness.   Low normal ejection fraction. Ejection fraction is visually estimated at 55%. There is doppler evidence of stage I diastolic dysfunction. Right Ventricle  Mildly dilated right ventricle. Right ventricle global systolic function is normal.   Left Atrium  Normal sized left atrium. Interatrial septum appears intact. Right Atrium  Normal right atrium size. Mitral Valve  Structurally normal mitral valve. No evidence of mitral valve stenosis. Mild mitral regurgitation is present. Tricuspid Valve  The tricuspid valve appears structurally normal.  Physiologic and/or trace tricuspid regurgitation. Pulmonary hypertension is not present. Aortic Valve  Individual aortic valve leaflets are not clearly visualized. No hemodynamically significant aortic stenosis is present. Mild aortic regurgitation is noted. Pulmonic Valve  The pulmonic valve was not well visualized. No evidence of pulmonic valve stenosis. No evidence of any pulmonic regurgitation. Pericardial Effusion  No evidence of pericardial effusion. Pleural Effusion  No evidence of pleural effusion. Aorta  Aortic root dimension within normal limits. Ct Chest Wo Contrast    Result Date: 1/16/2021  FINDINGS: The heart is normal in size. Atherosclerotic calcifications are associated with the coronary arteries. No pericardial effusion. Ascending thoracic aorta measures 3.2 cm in diameter. Descending thoracic aorta measures 2.4 cm in diameter. There is a combined origin of brachiocephalic trunk and left common carotid artery at thoracic aortic arch. No mediastinal fluid collections. No mediastinal or hilar lymphadenopathy. There are no airspace opacities. There is a loculated right pleural effusion with adjacent subsegmental atelectasis. No pneumothorax. View of the upper abdomen shows normal bilateral adrenal glands. 1.  Loculated right pleural effusion with adjacent subsegmental atelectasis. 2.  No evidence of pneumonia.  3.  Atherosclerotic calcifications associated with the coronary arteries. Us Carotid Artery Bilateral  Result Date: 1/17/2021  Atherosclerotic disease. No hemodynamically significant stenosis is identified Estimated stenosis by NASCET criteria in the proximal right carotid artery is between 0% and 49%. Estimated stenosis by NASCET criteria in the proximal left carotid artery is between 0% and 49%. Assessment: Ricardo Tesfaye is a 59-year-old gentleman seen for preoperative assessment with abnormal pulmonary assessment revealing severe restrictive physiologic defect with loss and diffusion capacity. The undergo bypass surgery in the next few days and we were asked to comment on preoperative risk. On 1/22/2021 underwent 1. Coronary artery bypass grafting x3 using left internal mammary artery to the left anterior descending artery, reverse saphenous vein graft to the ramus intermedius, and reverse saphenous vein graft to the distal right coronary artery. 2.  Lower extremity endoscopic vein harvest. 3.  Rigid sternal fixation with a KLS plating system. 4.  22 modifier. 1. Restrictive lung physiology  2. Acute Respiratory failure    3. MV CAD  4. Chronic pain  5. Post operative decondition  6. Anemai post op - transfusion  7. Sputum mixes  8. HLD  9. HTN  10. Loculated pleural effusion    Plan:   1. Continue with bronchodilators  2. Sputum samples - negative  3. PEP valve  4. Encourage ICS and ambuliation  5. Oxygen support keep >90%  6. CPAP at night   7. ICS @ 2 hours to prevent atelectasis  8. Continue PT   9.  Ok to discharge in [facility]      Sapna Arreola DO, MPH, FCCP, [facility], [address] [place] [address]

## 2021-01-31 NOTE — PROGRESS NOTES
POD#9  Awake, alert. No complaints. Denies CP, palpitations, SOB at rest, dizziness/lightheadedness. Vitals:    01/31/21 0400 01/31/21 0600 01/31/21 0712 01/31/21 0730   BP: 98/63   134/63   Pulse: 88   89   Resp: 16   18   Temp: 97.8 °F (36.6 °C)   97.8 °F (36.6 °C)   TempSrc: Temporal   Temporal   SpO2: 96%  90% 94%   Weight:  194 lb 6.4 oz (88.2 kg)     Height:         O2: RA      Intake/Output Summary (Last 24 hours) at 1/31/2021 0933  Last data filed at 1/31/2021 0806  Gross per 24 hour   Intake 730 ml   Output 675 ml   Net 55 ml         Moving bowels        Recent Labs     01/29/21  0530 01/30/21  0600 01/31/21  0540   WBC 7.0 6.4 6.9   HGB 9.0* 9.1* 9.7*   HCT 28.1* 28.6* 30.6*    211 236      Recent Labs     01/29/21  0530 01/30/21  0600 01/31/21  0540   BUN 15 13 11   CREATININE 0.9 0.8 0.8           PE  Cardiac: RRR  Lungs: decreased bases  Chest incision C/D/I, approximated, no erythema with small superficial area of MSI with very mild dehisence. Sternum stable. Abd: Soft, nontender, +BS  Ext: Incisions C/D/I, approximated, no erythema, stable thigh ecchymosis     A/P: POD# 9    1. CAD  --Stable s/p CABG x3 (LIMA-LAD, SVG-Ramus, SVG-RCA) on 1/22/2021  --Post op ELISEO reveals normal biventricular function and no valvular abnormalities  --Scr stable   --ASA/statin/BB- NO plavix due to gastritis   --reinforce sternal precautions  --all chest tubes and pacing wires out  --PICC placed 1/26  - betadine MSI daily and cover with dry dressing  -- stop checking labs        2. Acute blood loss anemia secondary to open heart surgery  -- hgb improved s/p transfusion 1/28; BP also improved        3. ST  -- improved   --amio STOPPED secondary to nausea   - staci low dose metop        4.  Prolonged postoperative respiratory insufficiency  --wean oxygen to keep SpO2 greater than or equal to 92%  --continue duonebs with ezpap  --encourage C&DB, SMI  --currently on RA  --pulmonary following   -- Abx per pulmonary -STOP zosyn per pulm  -- Lasix given 1/28        5. Constipation/abdominal distension  --resolved        6. DM 2  --hgA1c 6.6  --add home glipizide once improvement in oral intake - not yet  --continue high scale SSI  --recent steroid use may be adding to hyperglycemia  -- will add back nighttime lantus          7. Post operative deconditioning  --Increase activity as tolerated  --PT/OT  --currently ambulating 200ft        8. Cough with swallowing  - bedside swallow eval  passed  - zosyn started per pulm for sputum- agree especially given his recent emesis - aspiration protection-zosyn completed per pulm        9. Post op nausea/emesis x 1/ generalized abd pain  - cont zofran  - check amylase, lipase, LFTs - all normal; amylase just slightly elevated   - STOP AMIO      10. Post op urinary retention----> slight hematuria  - cota resinserted 1/25  - on flomax  - cont cota until more ambulatory- ok to DC with cota to rehab per Dr Cassandra Calderon  - apparently pt pulled a lot on cota- some blood noted- will have nsg monitor and irrigate cota prn     11.  Melena  -gen surg notified  - STOP plavix  - EGD done- gastrtis and ulcer found  - per gen surge PPI, carafate and ok for diet  - will cont to hold plavix        DVT prophylaxis:  --continue bilateral knee high NICOLASA hose  --continue PCDs  --continue progressive ambulation  --on lovenox for dvt prophylaxis and continue knee high NICOLASA hose/pcds/progressive ambulation        Dispo: rehab tomorrow         This patient's case and care plan was discussed with the attending surgeon

## 2021-01-31 NOTE — PATIENT CARE CONFERENCE
P Quality Flow/Interdisciplinary Rounds Progress Note        Quality Flow Rounds held on January 31, 2021    Disciplines Attending:  Bedside Nurse and Nursing Unit Leadership    Jonathon Hewitt was admitted on 1/15/2021 11:51 AM    Anticipated Discharge Date:  Expected Discharge Date: 01/29/21    Disposition:    Denny Score:  Denny Scale Score: 19    Readmission Risk              Risk of Unplanned Readmission:        21           Discussed patient goal for the day, patient clinical progression, and barriers to discharge.   The following Goal(s) of the Day/Commitment(s) have been identified:  have no bleeding       Montoya Prima  January 31, 2021

## 2021-02-01 VITALS
TEMPERATURE: 98.4 F | RESPIRATION RATE: 16 BRPM | DIASTOLIC BLOOD PRESSURE: 59 MMHG | OXYGEN SATURATION: 91 % | HEIGHT: 67 IN | BODY MASS INDEX: 30.17 KG/M2 | SYSTOLIC BLOOD PRESSURE: 115 MMHG | HEART RATE: 87 BPM | WEIGHT: 192.2 LBS

## 2021-02-01 LAB
METER GLUCOSE: 102 MG/DL (ref 74–99)
METER GLUCOSE: 222 MG/DL (ref 74–99)

## 2021-02-01 PROCEDURE — 6360000002 HC RX W HCPCS: Performed by: SURGERY

## 2021-02-01 PROCEDURE — 6370000000 HC RX 637 (ALT 250 FOR IP): Performed by: SURGERY

## 2021-02-01 PROCEDURE — 82962 GLUCOSE BLOOD TEST: CPT

## 2021-02-01 PROCEDURE — 93798 PHYS/QHP OP CAR RHAB W/ECG: CPT

## 2021-02-01 PROCEDURE — 2700000000 HC OXYGEN THERAPY PER DAY

## 2021-02-01 PROCEDURE — 99232 SBSQ HOSP IP/OBS MODERATE 35: CPT | Performed by: INTERNAL MEDICINE

## 2021-02-01 PROCEDURE — 94660 CPAP INITIATION&MGMT: CPT

## 2021-02-01 PROCEDURE — 2580000003 HC RX 258: Performed by: SURGERY

## 2021-02-01 PROCEDURE — 6370000000 HC RX 637 (ALT 250 FOR IP): Performed by: PHYSICIAN ASSISTANT

## 2021-02-01 RX ADMIN — PANTOPRAZOLE SODIUM 40 MG: 40 TABLET, DELAYED RELEASE ORAL at 08:21

## 2021-02-01 RX ADMIN — SUCRALFATE 1 G: 1 TABLET ORAL at 00:28

## 2021-02-01 RX ADMIN — MAGNESIUM GLUCONATE 500 MG ORAL TABLET 400 MG: 500 TABLET ORAL at 08:22

## 2021-02-01 RX ADMIN — FOLIC ACID 1 MG: 1 TABLET ORAL at 08:21

## 2021-02-01 RX ADMIN — GABAPENTIN 300 MG: 300 CAPSULE ORAL at 08:21

## 2021-02-01 RX ADMIN — ASPIRIN 81 MG: 81 TABLET, COATED ORAL at 08:22

## 2021-02-01 RX ADMIN — TAMSULOSIN HYDROCHLORIDE 0.4 MG: 0.4 CAPSULE ORAL at 08:22

## 2021-02-01 RX ADMIN — OXYCODONE HYDROCHLORIDE AND ACETAMINOPHEN 500 MG: 500 TABLET ORAL at 08:21

## 2021-02-01 RX ADMIN — METOPROLOL TARTRATE 12.5 MG: 25 TABLET, FILM COATED ORAL at 08:22

## 2021-02-01 RX ADMIN — SUCRALFATE 1 G: 1 TABLET ORAL at 05:18

## 2021-02-01 RX ADMIN — Medication 10 ML: at 08:22

## 2021-02-01 RX ADMIN — INSULIN LISPRO 6 UNITS: 100 INJECTION, SOLUTION INTRAVENOUS; SUBCUTANEOUS at 11:33

## 2021-02-01 RX ADMIN — DOCUSATE SODIUM 50 MG AND SENNOSIDES 8.6 MG 1 TABLET: 8.6; 5 TABLET, FILM COATED ORAL at 08:21

## 2021-02-01 RX ADMIN — BISACODYL 5 MG: 5 TABLET, COATED ORAL at 08:21

## 2021-02-01 RX ADMIN — ENOXAPARIN SODIUM 40 MG: 40 INJECTION SUBCUTANEOUS at 08:22

## 2021-02-01 RX ADMIN — POTASSIUM CHLORIDE 20 MEQ: 1500 TABLET, EXTENDED RELEASE ORAL at 08:27

## 2021-02-01 RX ADMIN — NYSTATIN 500000 UNITS: 100000 SUSPENSION ORAL at 08:22

## 2021-02-01 RX ADMIN — ANTI-FUNGAL POWDER MICONAZOLE NITRATE TALC FREE: 1.42 POWDER TOPICAL at 08:25

## 2021-02-01 RX ADMIN — FERROUS SULFATE TAB 325 MG (65 MG ELEMENTAL FE) 325 MG: 325 (65 FE) TAB at 08:21

## 2021-02-01 ASSESSMENT — PAIN SCALES - GENERAL
PAINLEVEL_OUTOF10: 0
PAINLEVEL_OUTOF10: 0

## 2021-02-01 NOTE — PROGRESS NOTES
Nurse to nurse called to [facility] at [facility]

## 2021-02-01 NOTE — PLAN OF CARE
Problem: Falls - Risk of:  Goal: Will remain free from falls  Description: Will remain free from falls  2/1/2021 1157 by Delgado Torres RN  Outcome: Completed  2/1/2021 0527 by Yesi Thomas RN  Outcome: Met This Shift  Goal: Absence of physical injury  Description: Absence of physical injury  2/1/2021 1157 by Delgado Torres RN  Outcome: Completed  2/1/2021 0527 by Yesi Thomas RN  Outcome: Met This Shift     Problem: Infection - Surgical Site:  Goal: Will show no infection signs and symptoms  Description: Will show no infection signs and symptoms  2/1/2021 1157 by Delgado Torres RN  Outcome: Completed  2/1/2021 0527 by Yesi Thomas RN  Outcome: Met This Shift     Problem: Discharge Planning:  Goal: Discharged to appropriate level of care  Description: Discharged to appropriate level of care  Outcome: Completed

## 2021-02-01 NOTE — CARE COORDINATION
SOCIAL WORK/CASEMANAGEMENT TRANSITION OF CARE [id number] [facility], [address], [place]ma[place], [phone]): discharge to [facility] today. covid was done on 1/30/2021. All discharge paperwork with exempt in soft chart. Called katiuska the rep at the [facility] and told her we will have family come around 1  p.m.  to take pt to her [facility]. Per william, the daughter, richard the son, needs called at [phone]. I called richard and he can be here around 1 p.m. snf;loc. Taylor Traylor  2/1/2021  Spoke with katiuska  from [facility] and they are able to take pt today. I faxed over the covid test results. Son will be here around 1:20 p.m.  Taylor Traylor  2/1/2021

## 2021-02-01 NOTE — PROGRESS NOTES
Patient discharged via private car with family to transport to [facility]

## 2021-02-01 NOTE — PROGRESS NOTES
[place]  Department of Internal Medicine  Division of Pulmonary, Critical Care and Sleep Medicine  Progress Note  Marlene Yanez DO, [facility], [facility], Anice Boas, MD, [facility]    Patient: Dolores Obando  MRN: [id number]  : 1940    Encounter Time: 11:50 AM     Date of Admission: 1/15/2021 11:51 AM    Primary Care Physician: WILVER Coburn NP    Reason for Consultation: Pre Op assesssment     SUBJECTIVE:    POD#10  Plans to rehab  On 2 liters  I/O are =      OBJECTIVE:     PHYSICAL EXAM:   VITALS:   Vitals:    21 0601 21 0720 21 0730 21 1102   BP:   (!) 140/70 (!) 115/59   Pulse:   96 87   Resp:   16 16   Temp:   97 °F (36.1 °C) 98.4 °F (36.9 °C)   TempSrc:   Temporal Infrared   SpO2:  (!) 88% 94% 91%   Weight: 192 lb 3.2 oz (87.2 kg)      Height:            Intake/Output Summary (Last 24 hours) at 2021 1150  Last data filed at 2021 1025  Gross per 24 hour   Intake 560 ml   Output 675 ml   Net -115 ml        CONSTITUTIONAL:   Alert, NAD  SKIN:     No rash, Skin discolorationm fair skin with AK  HEENT:     EOMI, MMM, No thrush  NECK:    No bruits, No JVP apprechiated  CV:      Sinus,  No gallops  PULMONARY:   Couse BS,  No Wheezing, No Rales, No Rhonchi      No noted egophony  ABDOMEN:     Soft, non-tender. BS normal. No R/R/G  EXT:    No deformities . No clubbing. Trace lower extremity edema,   PULSE:   Palpable.   PSYCHIATRIC:  Seems appropriate, No acute psycosis  MS:    No fractures, No gross weakness  NEUROLOGIC:   Non-focal     DATA: IMAGING & TESTING:     LABORATORY TESTS:    CBC:   Lab Results   Component Value Date    WBC 6.9 2021    RBC 3.36 2021    HGB 9.7 2021    HCT 30.6 2021    MCV 91.1 2021    MCH 28.9 2021    MCHC 31.7 2021    RDW 18.0 2021     2021    MPV 9.1 2021     CMP:    Lab Results   Component Value Date     2021    K 4.0 01/31/2021    K 4.3 01/16/2021     01/31/2021    CO2 30 01/31/2021    BUN 11 01/31/2021    CREATININE 0.8 01/31/2021    GFRAA >60 01/31/2021    LABGLOM >60 01/31/2021    GLUCOSE 132 01/31/2021    PROT 5.5 01/26/2021    LABALBU 3.3 01/26/2021    CALCIUM 7.6 01/31/2021    BILITOT 0.7 01/26/2021    ALKPHOS 104 01/26/2021    AST 39 01/26/2021    ALT 17 01/26/2021     Calcium:    Lab Results   Component Value Date    CALCIUM 7.6 01/31/2021        PRO-BNP:   Lab Results   Component Value Date    PROBNP 4,111 (H) 01/24/2021      ABGs:   Lab Results   Component Value Date    PH 7.332 01/22/2021    PO2 65.7 01/22/2021    PCO2 40.9 01/22/2021     Hemoglobin A1C: No components found for: HGBA1C    IMAGING:  Imaging tests were completed and reviewed and discussed radiology and care team involved and reveals     CHEST RADIOGRAM            Pulmonary function test: Pulmonary function test revealed forced vital capacity of 1.4 at 6 liters, 41% of predicted with an FEV1 of 1.5 at 7 liters, 44% ofpredicted with an FEV1/FVC ratio of 80%. Midflow rates are 60% of predicted with maximum voluntary ventilation at 20 liters per minute, 88% of predicted.     Static lung volumes with total vital capacity of 1.48 liters, 41% of predicted. Inspiratory capacity is 1.36 liters, 45% of predicted. Expiratory reserve volume 0.13 liters, 73% of predicted. The diffusion capacity is 17.17 mL/min per mmHg, which is 25% of predicted. Severely restricted lung physiology with noted moderate-to-severe loss in gas transfer. Echo Complete  TTE procedure:Echo Complete W/Doppler & Color Flow. Procedure Date Date: 01/16/2021 Start: 09:28 AM Study Location: Portable Technical Quality: Adequate visualization Indications:LV function. Patient Status: Routine Height: 67 inches Weight: 195 pounds BSA: 2 m^2 BMI: 30.54 kg/m^2  Findings   Left Ventricle  Left ventricle is normal in size. Normal left ventricular wall thickness.   Low normal ejection fraction. Ejection fraction is visually estimated at 55%. There is doppler evidence of stage I diastolic dysfunction. Right Ventricle  Mildly dilated right ventricle. Right ventricle global systolic function is normal.   Left Atrium  Normal sized left atrium. Interatrial septum appears intact. Right Atrium  Normal right atrium size. Mitral Valve  Structurally normal mitral valve. No evidence of mitral valve stenosis. Mild mitral regurgitation is present. Tricuspid Valve  The tricuspid valve appears structurally normal.  Physiologic and/or trace tricuspid regurgitation. Pulmonary hypertension is not present. Aortic Valve  Individual aortic valve leaflets are not clearly visualized. No hemodynamically significant aortic stenosis is present. Mild aortic regurgitation is noted. Pulmonic Valve  The pulmonic valve was not well visualized. No evidence of pulmonic valve stenosis. No evidence of any pulmonic regurgitation. Pericardial Effusion  No evidence of pericardial effusion. Pleural Effusion  No evidence of pleural effusion. Aorta  Aortic root dimension within normal limits. Ct Chest Wo Contrast    Result Date: 1/16/2021  FINDINGS: The heart is normal in size. Atherosclerotic calcifications are associated with the coronary arteries. No pericardial effusion. Ascending thoracic aorta measures 3.2 cm in diameter. Descending thoracic aorta measures 2.4 cm in diameter. There is a combined origin of brachiocephalic trunk and left common carotid artery at thoracic aortic arch. No mediastinal fluid collections. No mediastinal or hilar lymphadenopathy. There are no airspace opacities. There is a loculated right pleural effusion with adjacent subsegmental atelectasis. No pneumothorax. View of the upper abdomen shows normal bilateral adrenal glands. 1.  Loculated right pleural effusion with adjacent subsegmental atelectasis. 2.  No evidence of pneumonia.  3.  Atherosclerotic calcifications associated with the coronary arteries. Us Carotid Artery Bilateral  Result Date: 1/17/2021  Atherosclerotic disease. No hemodynamically significant stenosis is identified Estimated stenosis by NASCET criteria in the proximal right carotid artery is between 0% and 49%. Estimated stenosis by NASCET criteria in the proximal left carotid artery is between 0% and 49%. Assessment: Alexei Lima is a 72-year-old gentleman seen for preoperative assessment with abnormal pulmonary assessment revealing severe restrictive physiologic defect with loss and diffusion capacity. The undergo bypass surgery in the next few days and we were asked to comment on preoperative risk. On 1/22/2021 underwent 1. Coronary artery bypass grafting x3 using left internal mammary artery to the left anterior descending artery, reverse saphenous vein graft to the ramus intermedius, and reverse saphenous vein graft to the distal right coronary artery. 2.  Lower extremity endoscopic vein harvest. 3.  Rigid sternal fixation with a KLS plating system. 4.  22 modifier. 1. Restrictive lung physiology  2. Acute Respiratory failure    3. MV CAD  4. Chronic pain  5. Post operative decondition  6. Anemai post op - transfusion  7. Sputum mixes  8. HLD  9. HTN  10. Loculated pleural effusion    Plan:   1. Continue with bronchodilators  2. Sputum samples - negative  3. PEP valve  4. Encourage ICS and ambuliation  5. Oxygen support keep >90%  6. CPAP at night   7. ICS @ 2 hours to prevent atelectasis  8. Continue PT   9.  Ok to discharge       Tres Fish, MPH, FCCP, [facility], [address] [place] [address]

## 2021-02-01 NOTE — PROGRESS NOTES
ezpap  --encourage C&DB, SMI  --currently on RA  --pulmonary following   -- Abx per pulmonary -STOP zosyn per pulm  -- Lasix given 1/28        5. Constipation/abdominal distension  --resolved        6. DM 2  --hgA1c 6.6  --add home glipizide once improvement in oral intake - not yet  --continue high scale SSI  --recent steroid use may be adding to hyperglycemia  -- will add back nighttime lantus          7. Post operative deconditioning  --Increase activity as tolerated  --PT/OT  --currently ambulating 200ft        8. Cough with swallowing  - bedside swallow eval  passed  - zosyn started per pulm for sputum- agree especially given his recent emesis - aspiration protection-zosyn completed per pulm        9. Post op nausea/emesis x 1/ generalized abd pain  - cont zofran  - check amylase, lipase, LFTs - all normal; amylase just slightly elevated   - STOP AMIO      10. Post op urinary retention----> slight hematuria  - cota resinserted 1/25  - on flomax  - cont cota until more ambulatory- ok to DC with cota to rehab per Dr Lazo Congress  - apparently pt pulled a lot on cota- some blood noted- will have nsg monitor and irrigate cota prn- urine now clear     11.  Melena  -gen surg notified  - STOP plavix  - EGD done- gastrtis and ulcer found  - per gen surge PPI, carafate and ok for diet  - will cont to hold plavix        DVT prophylaxis:  --continue bilateral knee high NICOLASA hose  --continue PCDs  --continue progressive ambulation  --on lovenox for dvt prophylaxis and continue knee high NICOLASA hose/pcds/progressive ambulation        Dispo: rehab today             Dispo: home vs. Rehab pending progression in activity level        This patient's case and care plan was discussed with the attending surgeon

## 2021-02-08 NOTE — DISCHARGE SUMMARY
Physician Discharge Summary     Patient ID:  Josefina Greco  [id number]  6 y.o.  1940    Admit date: 1/15/2021    Discharge date and time: 2/1/2021  2:05 PM     Admitting Physician: Rosamaria Hargrove DO     Discharge Physician: Mateo Joshi DO    Admission Diagnoses: Non-ST elevation (NSTEMI) myocardial infarction [I21.4]  Stable angina pectoris ([address] [I20.8]    Discharge Diagnoses: same, multivessel CAD,   deconditioning, melena s/p endoscopy finding ulcer/gastritis; Prolonged postoperative respiratory insufficiency      PROCEDURES PERFORMED:  1. Coronary artery bypass grafting x3 using left internal mammary  artery to the left anterior descending artery, reverse saphenous vein  graft to the ramus intermedius, and reverse saphenous vein graft to the  distal right coronary artery. 2.  Lower extremity endoscopic vein harvest.  3.  Rigid sternal fixation with a KLS plating system. 4.  22 modifier. Admission Condition: fair    Discharged Condition: fair    Indication for Admission: 67 yo male with hx of NSTEMI back in November at which time he was covid positive. Cath at that time was deferred, and he underwent outpatient workup and recovered from covid. He underwent elective LHC  which revealed severe MVCAD. CTS is consulted for opinion on revascularization. Hospital Course:  On 1/22/21 patient underwent CABG x 3 by Dr. Crow Layton. The patient tolerated the operation well and was transferred to CVICU in stable condition. The patient was extubated several hours postoperatively. POD 1 transferred out of the ICU. Chest tubes were removed in the usual fashion once drainage slowed. PICC line was placed for poor vascular access. Pulmonary was consulted for Prolonged postoperative respiratory insufficiency. He was started on zosyn for aspiration protection,. He did pass a beside swallow test but had difficulty coughing and clearing secretions.  He was found to have abdominal pain and constipation and general surgery was consulted. Bowel regimen instituted and he was moving his bowels. He was transfused for hgb 7.7. He was also found to have melena. He got an upper endoscopy by Gen surg which found mild gastric ulceration and distal esophageal ulcer. He was started on carafate and PPI and plavix stopped. With improvements in mobility and dietary intake, the patient was deemed ready for discharge to rehab on POD 8. Consults: cardiology, pulmonary/intensive care and general surgery      Discharge Exam:     O2: 2-3 L/NC        Intake/Output Summary (Last 24 hours) at 1/30/2021 0847  Last data filed at 1/30/2021 0843      Gross per 24 hour   Intake 230 ml   Output 750 ml   Net -520 ml            Moving bowels                     Recent Labs     01/28/21  0450 01/28/21  1021 01/29/21  0530 01/30/21  0600   WBC 7.8  --  7.0 6.4   HGB 7.8* 9.3* 9.0* 9.1*   HCT 25.5* 28.4* 28.1* 28.6*     --  181 211            Recent Labs     01/28/21  0450 01/29/21  0530 01/30/21  0600   BUN 21 15 13   CREATININE 0.9 0.9 0.8               PE  Cardiac: RRR  Lungs: decreased bases  Chest incision C/D/I, approximated, no erythema with small superficial area of MSI with very mild dehisence. Sternum stable. Prior chest tube site incisions C/D/I, no erythema with intact sutures.    Abd: Soft, nontender, +BS  Ext: Incisions C/D/I, approximated, no erythema, + thigh ecchymosis from vein harvest, stable            Disposition: rehab    Patient Instructions:    NowledgeData Record   Home Medication Instructions NBL:[id number]    Printed on:02/08/21 1200   Medication Information                      ascorbic acid (VITAMIN C) 500 MG tablet  Take 1 tablet by mouth 2 times daily             aspirin 81 MG EC tablet  Take 1 tablet by mouth daily             budesonide (PULMICORT) 0.5 MG/2ML nebulizer suspension  Take 2 mLs by nebulization 2 times daily             docosanol (ABREVA) 10 % CREA cream  Apply 1 applicator topically 5 times daily for 10 days             docusate sodium (COLACE) 100 MG capsule  Take 1 capsule by mouth 2 times daily as needed for Constipation (constipation)             enoxaparin (LOVENOX) 40 MG/0.4ML injection  Inject 0.4 mLs into the skin daily Ok to stop once consistently ambulatory             ferrous sulfate (IRON 325) 325 (65 Fe) MG tablet  Take 1 tablet by mouth 2 times daily (with meals)             folic acid (FOLVITE) 1 MG tablet  Take 1 tablet by mouth daily             gabapentin (NEURONTIN) 300 MG capsule  Take 1 capsule by mouth 2 times daily for 30 days. glipiZIDE (GLUCOTROL) 5 MG tablet  Take 1 tablet by mouth 2 times daily (before meals)             magnesium oxide (MAG-OX) 400 (241.3 Mg) MG TABS tablet  Take 1 tablet by mouth daily for 14 days             MELATONIN PO  Take by mouth             metoprolol tartrate (LOPRESSOR) 25 MG tablet  Take 1 tablet by mouth 2 times daily             miconazole (MICOTIN) 2 % powder  Apply topically 2 times daily.              Mineral Oil-Hydrophil Petrolat OINT  Apply 1 applicator topically 2 times daily             Mineral Oil-Hydrophil Petrolat OINT  Apply 1 applicator topically 3 times daily as needed (with toileting)             nystatin (MYCOSTATIN) 205211 UNIT/ML suspension  Take 5 mLs by mouth 4 times daily             pantoprazole (PROTONIX) 40 MG tablet  Take 1 tablet by mouth daily             rosuvastatin (CRESTOR) 20 MG tablet  Take 1 tablet by mouth nightly             sennosides-docusate sodium (SENOKOT-S) 8.6-50 MG tablet  Take 1 tablet by mouth 2 times daily             sucralfate (CARAFATE) 1 GM tablet  Take 1 tablet by mouth 4 times daily             tamsulosin (FLOMAX) 0.4 MG capsule  Take 1 capsule by mouth daily for 14 days               Activity: sternal precautions  Diet: cardiac diet  Wound Care: keep wound clean and dry    Future Appointments   Date Time Provider Dl Guzmán   2/23/2021 12:45 PM Nagi Hanley DO CARDIO SURG [facility]         Smoking cessation education provided prior to discharge    Discussed with patient the benefits of participation in cardiac rehab after discharge once approved safe by the surgeon and that a referral will be made at the follow up appointment. Pt verbalized comprehension.     Signed:  Riley Mccrary PA-C

## 2021-02-23 ENCOUNTER — OFFICE VISIT (OUTPATIENT)
Dept: CARDIOTHORACIC SURGERY | Age: 81
End: 2021-02-23

## 2021-02-23 VITALS
SYSTOLIC BLOOD PRESSURE: 122 MMHG | BODY MASS INDEX: 29.09 KG/M2 | WEIGHT: 181 LBS | HEART RATE: 112 BPM | DIASTOLIC BLOOD PRESSURE: 72 MMHG | HEIGHT: 66 IN

## 2021-02-23 DIAGNOSIS — Z48.02 VISIT FOR SUTURE REMOVAL: ICD-10-CM

## 2021-02-23 DIAGNOSIS — Z95.1 S/P CABG X 3: ICD-10-CM

## 2021-02-23 DIAGNOSIS — Z95.1 S/P CABG (CORONARY ARTERY BYPASS GRAFT): Primary | ICD-10-CM

## 2021-02-23 DIAGNOSIS — R06.02 SOB (SHORTNESS OF BREATH): ICD-10-CM

## 2021-02-23 DIAGNOSIS — Z09 HOSPITAL DISCHARGE FOLLOW-UP: Primary | ICD-10-CM

## 2021-02-23 PROCEDURE — 99024 POSTOP FOLLOW-UP VISIT: CPT | Performed by: NURSE PRACTITIONER

## 2021-02-23 NOTE — PROGRESS NOTES
Chief Complaint   Patient presents with    Post-Op Check     cabg x 3         HPI:  Mr. Erasto Pineda is a 81-year-old male whom presents to the office today for routine post-operative follow-up status post: CABG x 3 on 1/22/2021. Currently, there are no complaints of any CP, major concerns, or incisional issues. He does c/o SOB with minimal activity, which he states has been ongoing since prior to surgery. Review of Systems:   Constitutional: Negative for fever and chills. Respiratory: Negative for cough, chest tightness and shortness of breath. Cardiovascular: Negative for chest pain, palpitations and leg swelling. Gastrointestinal: Negative for nausea, diarrhea and constipation. Musculoskeletal: Negative for back pain. Skin: Negative for color change. Neurological: Negative for dizziness, syncope and light-headedness. Objective:   Physical Exam   Constitutional: oriented to person, place, and time. No distress. Cardiovascular: Normal rate. Pulmonary/Chest: Effort normal. No respiratory distress. midsternal and chest tube incisions well healed without evidence of infection. Sternum stable. Abdominal: Soft. Bowel sounds are normal.   Musculoskeletal: Normal range of motion. Exhibits no edema. Neurological: alert and oriented to person, place, and time. Skin: Skin is warm and dry. No erythema. Vein harvest incisions intact without evidence of infection   Psychiatric: normal mood and affect. Assessment:      S/P CABG       Suture removal     Plan:     SpO2 93% at rest on RA  --will facilitate post op pulmonary apt   Remove sternal precautions on 3/5/2021  Prior chest tube site incision sutures removed without difficulty. Patient tolerated well  Cardiac rehab referral  Continue follow up with PCP, Cardiology, Pulmonary as scheduled. Encouraged to call office with any questions, concerns. Otherwise no further follow up necessary from CTS standpoint.

## 2021-07-13 ENCOUNTER — OFFICE VISIT (OUTPATIENT)
Dept: PRIMARY CARE CLINIC | Age: 81
End: 2021-07-13
Payer: MEDICARE

## 2021-07-13 VITALS
HEART RATE: 110 BPM | TEMPERATURE: 97.4 F | DIASTOLIC BLOOD PRESSURE: 70 MMHG | WEIGHT: 185.8 LBS | OXYGEN SATURATION: 98 % | BODY MASS INDEX: 29.99 KG/M2 | SYSTOLIC BLOOD PRESSURE: 110 MMHG

## 2021-07-13 DIAGNOSIS — I10 ESSENTIAL HYPERTENSION: ICD-10-CM

## 2021-07-13 DIAGNOSIS — I25.118 CORONARY ARTERY DISEASE OF NATIVE HEART WITH STABLE ANGINA PECTORIS, UNSPECIFIED VESSEL OR LESION TYPE (HCC): ICD-10-CM

## 2021-07-13 DIAGNOSIS — S61.411A LACERATION OF RIGHT HAND WITHOUT FOREIGN BODY, INITIAL ENCOUNTER: ICD-10-CM

## 2021-07-13 DIAGNOSIS — E11.9 TYPE 2 DIABETES MELLITUS WITHOUT COMPLICATION, WITHOUT LONG-TERM CURRENT USE OF INSULIN (HCC): ICD-10-CM

## 2021-07-13 DIAGNOSIS — M15.9 PRIMARY OSTEOARTHRITIS INVOLVING MULTIPLE JOINTS: ICD-10-CM

## 2021-07-13 DIAGNOSIS — Z91.81 AT HIGH RISK FOR FALLS: ICD-10-CM

## 2021-07-13 DIAGNOSIS — J44.9 CHRONIC OBSTRUCTIVE PULMONARY DISEASE, UNSPECIFIED COPD TYPE (HCC): ICD-10-CM
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
## 2021-07-13 PROBLEM — I21.4 NSTEMI, INITIAL EPISODE OF CARE (HCC): Status: ACTIVE | Noted: 2021-07-13

## 2021-07-13 PROBLEM — E78.49 OTHER HYPERLIPIDEMIA: Status: ACTIVE | Noted: 2018-09-14

## 2021-07-13 PROCEDURE — 3023F SPIROM DOC REV: CPT | Performed by: NURSE PRACTITIONER

## 2021-07-13 PROCEDURE — 4040F PNEUMOC VAC/ADMIN/RCVD: CPT | Performed by: NURSE PRACTITIONER

## 2021-07-13 PROCEDURE — 99214 OFFICE O/P EST MOD 30 MIN: CPT | Performed by: NURSE PRACTITIONER

## 2021-07-13 PROCEDURE — 1123F ACP DISCUSS/DSCN MKR DOCD: CPT | Performed by: NURSE PRACTITIONER

## 2021-07-13 PROCEDURE — G8427 DOCREV CUR MEDS BY ELIG CLIN: HCPCS | Performed by: NURSE PRACTITIONER

## 2021-07-13 PROCEDURE — G8926 SPIRO NO PERF OR DOC: HCPCS | Performed by: NURSE PRACTITIONER

## 2021-07-13 PROCEDURE — 4004F PT TOBACCO SCREEN RCVD TLK: CPT | Performed by: NURSE PRACTITIONER

## 2021-07-13 PROCEDURE — G8417 CALC BMI ABV UP PARAM F/U: HCPCS | Performed by: NURSE PRACTITIONER

## 2021-07-13 RX ORDER — CELECOXIB 100 MG/1
100 CAPSULE ORAL 2 TIMES DAILY
Qty: 180 CAPSULE | Refills: 1 | Status: SHIPPED
Start: 2021-07-13 | End: 2022-04-01 | Stop reason: SDUPTHER

## 2021-07-13 RX ORDER — CEPHALEXIN 500 MG/1
500 CAPSULE ORAL 3 TIMES DAILY
Qty: 30 CAPSULE | Refills: 0 | Status: SHIPPED | OUTPATIENT
Start: 2021-07-13 | End: 2021-07-23

## 2021-07-13 RX ORDER — MULTIVIT-MIN/IRON/FOLIC ACID/K 18-600-40
CAPSULE ORAL
COMMUNITY

## 2021-07-13 RX ORDER — ZOSTER VACCINE RECOMBINANT, ADJUVANTED 50 MCG/0.5
KIT INTRAMUSCULAR
COMMUNITY

## 2021-07-13 ASSESSMENT — ENCOUNTER SYMPTOMS
NAUSEA: 0
PHOTOPHOBIA: 0
SHORTNESS OF BREATH: 1
CHEST TIGHTNESS: 0
SORE THROAT: 0
RHINORRHEA: 0
VOICE CHANGE: 0
EYE REDNESS: 0
EYE ITCHING: 0
WHEEZING: 0
SINUS PAIN: 0
COUGH: 0
CONSTIPATION: 0
ABDOMINAL DISTENTION: 0
EYE DISCHARGE: 0
DIARRHEA: 0
BACK PAIN: 0
EYE PAIN: 0
COLOR CHANGE: 1
CHOKING: 0
FACIAL SWELLING: 0
ABDOMINAL PAIN: 0
BLOOD IN STOOL: 0
TROUBLE SWALLOWING: 0
SINUS PRESSURE: 0
VOMITING: 0

## 2021-07-13 ASSESSMENT — PATIENT HEALTH QUESTIONNAIRE - PHQ9
2. FEELING DOWN, DEPRESSED OR HOPELESS: 0
1. LITTLE INTEREST OR PLEASURE IN DOING THINGS: 0
SUM OF ALL RESPONSES TO PHQ QUESTIONS 1-9: 0
SUM OF ALL RESPONSES TO PHQ9 QUESTIONS 1 & 2: 0

## 2021-07-13 ASSESSMENT — VISUAL ACUITY: OU: 1

## 2021-07-13 NOTE — PROGRESS NOTES
21  General Coop : 1940 Sex: male  Age: 68 y.o. Chief Complaint   Patient presents with    Fall     1  week ago, went to put something on counter an fell to the ground, laceration to bilateral hands and hit head on floor , did not go to er       Other     would like to d/c some meds, but would like celebrex again       Geoffrey Marte is seen today for a fall, from approximately 1 week ago. He states that he fell to the ground and struck his head, but did not go to the ER for evaluation. He also has multiple skin tears and laceration to his right hand. He states that the bleeding quickly stopped, but he was not able to hold the skin together on his right hand. It does look like it should have been sutured. He believes that the area to his right hand is now infected. He states that he is fallen 5 or 6 times, in the past year. He states that his legs go out from underneath him. I did mention that I believe that he needs some physical therapy to help to regain his strength and stamina. He states that he has an occasional episode of confusion, where he is not able to remember names or remember certain details. He would like to have his Celebrex refilled. Review of Systems   Constitutional: Negative for appetite change, chills, diaphoresis, fatigue, fever and unexpected weight change. Has fall 5-6 times in the past year. HENT: Negative for congestion, ear pain, facial swelling, hearing loss, nosebleeds, postnasal drip, rhinorrhea, sinus pressure, sinus pain, sneezing, sore throat, tinnitus, trouble swallowing and voice change. Eyes: Negative for photophobia, pain, discharge, redness and itching. Respiratory: Positive for shortness of breath (with exertion). Negative for cough, choking, chest tightness and wheezing. Cardiovascular: Negative for chest pain, palpitations and leg swelling.    Gastrointestinal: Negative for abdominal distention, abdominal pain, blood in stool, constipation, diarrhea, nausea and vomiting. Endocrine: Negative for cold intolerance, heat intolerance, polydipsia, polyphagia and polyuria. Genitourinary: Negative for difficulty urinating, dysuria, flank pain, frequency, hematuria and urgency. Musculoskeletal: Positive for arthralgias. Negative for back pain, gait problem, joint swelling, myalgias, neck pain and neck stiffness. Skin: Positive for color change (Redness around multiple hand wounds) and wound (Large crescent moon shape laceration to right hand). Negative for rash. Allergic/Immunologic: Negative for environmental allergies and food allergies. Neurological: Positive for weakness. Negative for dizziness, tremors, seizures, syncope, facial asymmetry, speech difficulty, light-headedness, numbness and headaches. Hematological: Does not bruise/bleed easily. Psychiatric/Behavioral: Positive for confusion (Occasionally). Negative for agitation, behavioral problems, decreased concentration, hallucinations, self-injury, sleep disturbance and suicidal ideas. The patient is not nervous/anxious.           Current Outpatient Medications:     Cholecalciferol (VITAMIN D) 50 MCG (2000 UT) CAPS capsule, Take by mouth, Disp: , Rfl:     cephALEXin (KEFLEX) 500 MG capsule, Take 1 capsule by mouth 3 times daily for 10 days, Disp: 30 capsule, Rfl: 0    celecoxib (CELEBREX) 100 MG capsule, Take 1 capsule by mouth 2 times daily, Disp: 180 capsule, Rfl: 1    zoster recombinant adjuvanted vaccine (SHINGRIX) 50 MCG/0.5ML SUSR injection, Shingrix (PF) 50 mcg/0.5 mL intramuscular suspension, kit  inject 0.5 milliliter intramuscularly, Disp: , Rfl:     pneumococcal 13-valent conjugate (PREVNAR 13) SUSP inj, Prevnar 13 (PF) 0.5 mL intramuscular syringe  inject 0.5 milliliter intramuscularly, Disp: , Rfl:     metoprolol tartrate (LOPRESSOR) 25 MG tablet, Take 1 tablet by mouth 2 times daily, Disp: 60 tablet, Rfl: 3    aspirin 81 MG EC tablet, Take 1 tablet by mouth daily, Disp: 30 tablet, Rfl: 3    budesonide (PULMICORT) 0.5 MG/2ML nebulizer suspension, Take 2 mLs by nebulization 2 times daily, Disp: 60 ampule, Rfl: 3    rosuvastatin (CRESTOR) 20 MG tablet, Take 1 tablet by mouth nightly, Disp: 30 tablet, Rfl: 3    sennosides-docusate sodium (SENOKOT-S) 8.6-50 MG tablet, Take 1 tablet by mouth 2 times daily, Disp: 20 tablet, Rfl: 0    magnesium oxide (MAG-OX) 400 (241.3 Mg) MG TABS tablet, Take 1 tablet by mouth daily for 14 days, Disp: 14 tablet, Rfl: 0    gabapentin (NEURONTIN) 300 MG capsule, Take 1 capsule by mouth 2 times daily for 30 days. , Disp: 60 capsule, Rfl: 0    glipiZIDE (GLUCOTROL) 5 MG tablet, Take 1 tablet by mouth 2 times daily (before meals), Disp: 60 tablet, Rfl: 0  Allergies   Allergen Reactions    Meperidine Hcl     Propoxyphene     Shrimp (Diagnostic)     Valium [Diazepam]        Past Medical History:   Diagnosis Date    Arthritis     CAD (coronary artery disease) 1/16/2021    Cancer ([address]     COPD (chronic obstructive pulmonary disease) ([address]     Diabetes mellitus ([address]     HTN (hypertension) 1/16/2021    Hx of blood clots     Hyperlipidemia      Past Surgical History:   Procedure Laterality Date    BACK SURGERY  1995    CHOLECYSTECTOMY  2003    CORONARY ARTERY BYPASS GRAFT N/A 1/22/2021    CORONARY ARTERY BYPASS, ELISEO -- WANTS 0700 performed by Raman Loving DO at [place] [id number]      UPPER GASTROINTESTINAL ENDOSCOPY N/A 1/29/2021    EGD DIAGNOSTIC ONLY performed by Asuncion Hannah MD at [facility] ENDOSCOPY     Family History   Problem Relation Age of Onset    Diabetes Mother      Social History     Socioeconomic History    Marital status:       Spouse name: Not on file    Number of children: Not on file    Years of education: Not on file    Highest education level: Not on file   Occupational History    Not on file   Tobacco Use    Smoking status: Former Smoker     Packs/day: 1.00     Years: 32.00 Pack years: 32.00     Types: Cigarettes     Start date:      Quit date:      Years since quittin.5    Smokeless tobacco: Current User     Types: Snuff   Substance and Sexual Activity    Alcohol use: Not Currently    Drug use: Never    Sexual activity: Not on file   Other Topics Concern    Not on file   Social History Narrative    Not on file     Social Determinants of Health     Financial Resource Strain:     Difficulty of Paying Living Expenses:    Food Insecurity:     Worried About Running Out of Food in the Last Year:     [address] in the Last Year:    Transportation Needs:     Lack of Transportation (Medical):  Lack of Transportation (Non-Medical):    Physical Activity:     Days of Exercise per Week:     Minutes of Exercise per Session:    Stress:     Feeling of Stress :    Social Connections:     Frequency of Communication with Friends and Family:     Frequency of Social Gatherings with Friends and Family:     Attends Zoroastrianism Services:     Active Member of Clubs or Organizations:     Attends Club or Organization Meetings:     Marital Status:    Intimate Partner Violence:     Fear of Current or Ex-Partner:     Emotionally Abused:     Physically Abused:     Sexually Abused:        Vitals:    21 1454   BP: 110/70   Pulse: 110   Temp: 97.4 °F (36.3 °C)   TempSrc: Temporal   SpO2: 98%   Weight: 185 lb 12.8 oz (84.3 kg)       Physical Exam  Vitals and nursing note reviewed. Constitutional:       General: He is awake. He is not in acute distress. Appearance: Normal appearance. He is well-developed. He is obese. He is not ill-appearing, toxic-appearing or diaphoretic. HENT:      Head: Normocephalic and atraumatic. Right Ear: Hearing and external ear normal. There is no impacted cerumen. Left Ear: Hearing and external ear normal. There is no impacted cerumen. Nose: Nose normal. No congestion or rhinorrhea. Mouth/Throat:      Lips: Pink.  No lesions. Mouth: Mucous membranes are moist.      Pharynx: Oropharynx is clear. No oropharyngeal exudate or posterior oropharyngeal erythema. Eyes:      General: Lids are normal. Vision grossly intact. Gaze aligned appropriately. No scleral icterus. Right eye: No discharge. Left eye: No discharge. Extraocular Movements: Extraocular movements intact. Conjunctiva/sclera: Conjunctivae normal.      Right eye: Right conjunctiva is not injected. Left eye: Left conjunctiva is not injected. Pupils: Pupils are equal, round, and reactive to light. Neck:      Thyroid: No thyromegaly. Vascular: No carotid bruit. Trachea: Trachea normal.   Cardiovascular:      Rate and Rhythm: Normal rate and regular rhythm. Pulses: Normal pulses. Heart sounds: Normal heart sounds, S1 normal and S2 normal. No murmur heard. No friction rub. No gallop. Pulmonary:      Effort: Pulmonary effort is normal. No tachypnea, accessory muscle usage or respiratory distress. Breath sounds: Normal breath sounds and air entry. No stridor. No wheezing, rhonchi or rales. Chest:      Chest wall: No tenderness. Abdominal:      General: Bowel sounds are normal. There is no distension. Palpations: Abdomen is soft. There is no mass. Tenderness: There is no abdominal tenderness. There is no right CVA tenderness, left CVA tenderness, guarding or rebound. Hernia: No hernia is present. Musculoskeletal:         General: No swelling, tenderness, deformity or signs of injury. Normal range of motion. Hands:       Cervical back: Full passive range of motion without pain, normal range of motion and neck supple. No rigidity. No muscular tenderness. Right lower leg: No edema. Left lower leg: No edema. Lymphadenopathy:      Cervical: No cervical adenopathy. Skin:     General: Skin is warm and dry. Capillary Refill: Capillary refill takes less than 2 seconds. Coloration: Skin is not jaundiced or pale. Findings: No bruising, erythema, lesion or rash. Neurological:      General: No focal deficit present. Mental Status: He is alert and oriented to person, place, and time. Mental status is at baseline. Cranial Nerves: Cranial nerves are intact. No cranial nerve deficit. Sensory: Sensation is intact. No sensory deficit. Motor: Motor function is intact. No weakness. Coordination: Coordination is intact. Coordination normal.      Gait: Gait abnormal.      Deep Tendon Reflexes: Reflexes normal.   Psychiatric:         Attention and Perception: Attention and perception normal.         Mood and Affect: Mood and affect normal.         Speech: Speech normal.         Behavior: Behavior normal. Behavior is cooperative. Thought Content: Thought content normal.         Cognition and Memory: Cognition and memory normal.         Judgment: Judgment normal.         Assessment and Plan:  Anne Odell was seen today for fall and other. Diagnoses and all orders for this visit:    Fall, initial encounter    Laceration of right hand without foreign body, initial encounter  -     cephALEXin (KEFLEX) 500 MG capsule; Take 1 capsule by mouth 3 times daily for 10 days    Primary osteoarthritis involving multiple joints  -     celecoxib (CELEBREX) 100 MG capsule; Take 1 capsule by mouth 2 times daily    At high risk for falls    Essential hypertension    Coronary artery disease of native heart with stable angina pectoris, unspecified vessel or lesion type (HCC)    Chronic obstructive pulmonary disease, unspecified COPD type ([address]    Type 2 diabetes mellitus without complication, without long-term current use of insulin ([address]        Discussions/Education provided to patients during visit:  [] Discussed the importance to stop smoking. [x] Advised to monitor eating habits. [] Reviewed and discussed Imaging results. [] Reviewed and discussed Lab results.   [x] Discussed the importance of drinking plenty of fluids. [x] Cut down on Salt, Caffeine, and Sugar. [x] Continue Medications as Discussed. [x] Communicated with patient any concerns, to phone office. *He is refusing physical therapy, and x-ray of bilateral hands and wrists, and to have blood work drawn. Return in about 4 weeks (around 8/10/2021) for Review of chronic conditions. I spent 30 minutes face-to-face with this patient. Seen By:  WILVER Velez NP    On the basis of positive falls risk screening, assessment and plan is as follows: patient declines any further evaluation/treatment for increased falls risk.
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
## 2021-09-29 RX ORDER — GABAPENTIN 300 MG/1
300 CAPSULE ORAL 2 TIMES DAILY
Qty: 60 CAPSULE | Refills: 5 | Status: SHIPPED
Start: 2021-09-29 | End: 2022-04-01 | Stop reason: SDUPTHER

## 2021-10-11 ENCOUNTER — HOSPITAL ENCOUNTER (OUTPATIENT)
Age: 81
Discharge: HOME | End: 2021-10-13

## 2021-10-11 PROCEDURE — 88305 TISSUE EXAM BY PATHOLOGIST: CPT

## 2021-10-11 PROCEDURE — 88332 PATH CONSLTJ SURG EA ADD BLK: CPT

## 2021-10-11 PROCEDURE — 88331 PATH CONSLTJ SURG 1 BLK 1SPC: CPT

## 2021-10-14 ENCOUNTER — OFFICE VISIT (OUTPATIENT)
Dept: PRIMARY CARE CLINIC | Age: 81
End: 2021-10-14
Payer: MEDICARE

## 2021-10-14 VITALS
SYSTOLIC BLOOD PRESSURE: 110 MMHG | HEIGHT: 67 IN | BODY MASS INDEX: 27.78 KG/M2 | WEIGHT: 177 LBS | TEMPERATURE: 97.8 F | OXYGEN SATURATION: 96 % | DIASTOLIC BLOOD PRESSURE: 66 MMHG | HEART RATE: 111 BPM

## 2021-10-14 DIAGNOSIS — M17.11 ARTHRITIS OF RIGHT KNEE: Primary | ICD-10-CM

## 2021-10-14 DIAGNOSIS — M25.461 EFFUSION OF RIGHT KNEE: ICD-10-CM

## 2021-10-14 PROCEDURE — 20610 DRAIN/INJ JOINT/BURSA W/O US: CPT | Performed by: STUDENT IN AN ORGANIZED HEALTH CARE EDUCATION/TRAINING PROGRAM

## 2021-10-14 PROCEDURE — G8484 FLU IMMUNIZE NO ADMIN: HCPCS | Performed by: STUDENT IN AN ORGANIZED HEALTH CARE EDUCATION/TRAINING PROGRAM

## 2021-10-14 PROCEDURE — 1123F ACP DISCUSS/DSCN MKR DOCD: CPT | Performed by: STUDENT IN AN ORGANIZED HEALTH CARE EDUCATION/TRAINING PROGRAM

## 2021-10-14 PROCEDURE — 4004F PT TOBACCO SCREEN RCVD TLK: CPT | Performed by: STUDENT IN AN ORGANIZED HEALTH CARE EDUCATION/TRAINING PROGRAM

## 2021-10-14 PROCEDURE — G8417 CALC BMI ABV UP PARAM F/U: HCPCS | Performed by: STUDENT IN AN ORGANIZED HEALTH CARE EDUCATION/TRAINING PROGRAM

## 2021-10-14 PROCEDURE — G8427 DOCREV CUR MEDS BY ELIG CLIN: HCPCS | Performed by: STUDENT IN AN ORGANIZED HEALTH CARE EDUCATION/TRAINING PROGRAM

## 2021-10-14 PROCEDURE — 4040F PNEUMOC VAC/ADMIN/RCVD: CPT | Performed by: STUDENT IN AN ORGANIZED HEALTH CARE EDUCATION/TRAINING PROGRAM

## 2021-10-14 RX ORDER — TRIAMCINOLONE ACETONIDE 40 MG/ML
40 INJECTION, SUSPENSION INTRA-ARTICULAR; INTRAMUSCULAR ONCE
Status: COMPLETED | OUTPATIENT
Start: 2021-10-14 | End: 2021-10-14

## 2021-10-14 RX ADMIN — TRIAMCINOLONE ACETONIDE 40 MG: 40 INJECTION, SUSPENSION INTRA-ARTICULAR; INTRAMUSCULAR at 12:45

## 2021-10-14 NOTE — PROGRESS NOTES
WALK-IN CARE CLINIC VISIT    10/14/21  Name: Mich Tello   : 1940   Age: 80 y.o. Sex: male        Assessment & Plan:       ICD-10-CM    1. Arthritis of right knee  M17.11 triamcinolone acetonide (KENALOG-40) injection 40 mg     CT ARTHROCENTESIS ASPIR&/INJ MAJOR JT/BURSA W/O US     CANCELED: XR KNEE LEFT (MIN 4 VIEWS)     CANCELED: XR KNEE LEFT (MIN 4 VIEWS)   2. Effusion of right knee  M25.461        Exam with effusion of right knee with significant tenderness on exam and difficulty walking due to pain. No evidence of infection. XR with evidence of tricompartmental right knee arthritis. Patient desired injection for this issue. See procedure note below. Patient advised to follow up with his orthopedist. Continue bracing for comfort. Knee Arthrocentesis with Injection Procedure Note  Pre-operative Diagnosis: right knee osteoarthritis and effusion  Post-operative Diagnosis: same  Indications: Symptomatic relief of large effusion and from osteoarthritis  Anesthesia: not required  Procedure Details:  Discussed risks and benefits of procedure. Verbal and written consent was obtained for the procedure. While doing procedure, patient stated \"You know, I'm going to jaime you if this doesn't work. No, I'm kidding. It will work. \"  The joint was prepped with alcohol. A 21 gauge needle was inserted into the inferior aspect of the joint from a lateral approach. 20 ml of clear yellow fluid was removed from the joint and discarded. 4 ml 1% lidocaine and 1 ml of triamcinolone (KENALOG) 40mg/ml was then injected into the joint through the same needle. The needle was removed and the area cleansed and dressed. Complications:  None; patient tolerated the procedure well. Counseled patient regarding above diagnosis.   Counseled patient as appropriate and relevant regarding any possible side effects, risks, and alternatives to treatment; the patient verbalizes understanding, and is in agreement with the plan as detailed above. All educational materials and instructions were discussed and included on the After Visit Summary. All questions answered to the patient's satisfaction. The patient was advised to call for any concerns or return if any of the signs or symptoms worsen. This provider and patient were wearing surgical masks and practiced social distancing when appropriate during visit due to COVID-19 pandemic. Subjective:     Chief Complaint   Patient presents with    Knee Pain     couple weeks ago started getting sore and has swelling       Patient presents with 2 weeks of right knee pain and swelling  Gets worse when he walks  Has history of arthritis  Previously took Celebrex, not currently  Takes gabapentin  Wears brace  Uses cane to walk  Denies fall or injury    Review of Systems   Constitutional: Negative for chills and fever. Musculoskeletal: Positive for arthralgias, gait problem and myalgias (right knee). Skin: Negative for rash and wound.        Medical History:     Patient Active Problem List   Diagnosis    Stable angina pectoris ([address]    COPD (chronic obstructive pulmonary disease) ([address]    Hypertension    Diabetes mellitus ([address]    Other hyperlipidemia    CAD (coronary artery disease)    NSTEMI, initial episode of care [facility])        Past Medical History:   Diagnosis Date    Arthritis     CAD (coronary artery disease) 1/16/2021    Cancer ([address]     COPD (chronic obstructive pulmonary disease) ([address]     Diabetes mellitus ([address]     HTN (hypertension) 1/16/2021    Hx of blood clots     Hyperlipidemia        Past Surgical History:   Procedure Laterality Date    BACK SURGERY  1995    CHOLECYSTECTOMY  2003    CORONARY ARTERY BYPASS GRAFT N/A 1/22/2021    CORONARY ARTERY BYPASS, ELISEO -- WANTS 0700 performed by Elgin Camacho DO at [address] [place] [address] ENDOSCOPY N/A 1/29/2021    EGD DIAGNOSTIC ONLY performed by Delaney Hanley MD at [facility] ENDOSCOPY       Family History   Problem Relation Age of Onset    Diabetes Mother        Medications:     Current Outpatient Medications:     gabapentin (NEURONTIN) 300 MG capsule, Take 1 capsule by mouth 2 times daily for 180 days. , Disp: 60 capsule, Rfl: 5    zoster recombinant adjuvanted vaccine (SHINGRIX) 50 MCG/0.5ML SUSR injection, Shingrix (PF) 50 mcg/0.5 mL intramuscular suspension, kit  inject 0.5 milliliter intramuscularly, Disp: , Rfl:     pneumococcal 13-valent conjugate (PREVNAR 13) SUSP inj, Prevnar 13 (PF) 0.5 mL intramuscular syringe  inject 0.5 milliliter intramuscularly, Disp: , Rfl:     Cholecalciferol (VITAMIN D) 50 MCG (2000 UT) CAPS capsule, Take by mouth, Disp: , Rfl:     celecoxib (CELEBREX) 100 MG capsule, Take 1 capsule by mouth 2 times daily, Disp: 180 capsule, Rfl: 1    metoprolol tartrate (LOPRESSOR) 25 MG tablet, Take 1 tablet by mouth 2 times daily, Disp: 60 tablet, Rfl: 3    aspirin 81 MG EC tablet, Take 1 tablet by mouth daily, Disp: 30 tablet, Rfl: 3    budesonide (PULMICORT) 0.5 MG/2ML nebulizer suspension, Take 2 mLs by nebulization 2 times daily, Disp: 60 ampule, Rfl: 3    rosuvastatin (CRESTOR) 20 MG tablet, Take 1 tablet by mouth nightly, Disp: 30 tablet, Rfl: 3    sennosides-docusate sodium (SENOKOT-S) 8.6-50 MG tablet, Take 1 tablet by mouth 2 times daily, Disp: 20 tablet, Rfl: 0    glipiZIDE (GLUCOTROL) 5 MG tablet, Take 1 tablet by mouth 2 times daily (before meals), Disp: 60 tablet, Rfl: 0    magnesium oxide (MAG-OX) 400 (241.3 Mg) MG TABS tablet, Take 1 tablet by mouth daily for 14 days, Disp: 14 tablet, Rfl: 0    Allergies: Allergies   Allergen Reactions    Meperidine Hcl     Propoxyphene     Valium [Diazepam]        Social History:     Social History     Socioeconomic History    Marital status:       Spouse name: Not on file    Number of children: Not on file    Years of education: Not on file    Highest education level: Not on file line and patellar tendon. Normal pulse. Skin:     General: Skin is warm and dry. Capillary Refill: Capillary refill takes less than 2 seconds. Neurological:      Mental Status: He is alert and oriented to person, place, and time. Gait: Gait abnormal (uses cane to ambulate).          Testing:     Orders Placed This Encounter   Procedures    HI ARTHROCENTESIS ASPIR&/INJ MAJOR JT/BURSA W/O US

## 2021-12-27 RX ORDER — ROSUVASTATIN CALCIUM 20 MG/1
20 TABLET, COATED ORAL NIGHTLY
Qty: 90 TABLET | Refills: 0 | Status: SHIPPED
Start: 2021-12-27 | End: 2022-04-01 | Stop reason: SDUPTHER

## 2022-02-08 DIAGNOSIS — E11.9 TYPE 2 DIABETES MELLITUS WITHOUT COMPLICATION, WITHOUT LONG-TERM CURRENT USE OF INSULIN (HCC): Primary | ICD-10-CM

## 2022-02-08 RX ORDER — GLIPIZIDE 5 MG/1
5 TABLET ORAL
Qty: 60 TABLET | Refills: 0 | Status: SHIPPED
Start: 2022-02-08 | End: 2022-03-09

## 2022-02-08 NOTE — TELEPHONE ENCOUNTER
Spoke with patient and he states he is not able to come in due to not being able to get in and out of his car due to bad knees.

## 2022-03-09 DIAGNOSIS — E11.9 TYPE 2 DIABETES MELLITUS WITHOUT COMPLICATION, WITHOUT LONG-TERM CURRENT USE OF INSULIN (HCC): ICD-10-CM

## 2022-03-09 RX ORDER — GLIPIZIDE 5 MG/1
5 TABLET ORAL
Qty: 30 TABLET | Refills: 0 | Status: SHIPPED
Start: 2022-03-09 | End: 2022-04-01 | Stop reason: SDUPTHER

## 2022-03-25 DIAGNOSIS — E11.9 TYPE 2 DIABETES MELLITUS WITHOUT COMPLICATION, WITHOUT LONG-TERM CURRENT USE OF INSULIN (HCC): ICD-10-CM

## 2022-03-25 RX ORDER — GLIPIZIDE 5 MG/1
5 TABLET ORAL
Qty: 30 TABLET | Refills: 0 | OUTPATIENT
Start: 2022-03-25 | End: 2022-04-09

## 2022-03-29 RX ORDER — ROSUVASTATIN CALCIUM 20 MG/1
TABLET, COATED ORAL
Qty: 90 TABLET | Refills: 0 | OUTPATIENT
Start: 2022-03-29

## 2022-04-01 ENCOUNTER — OFFICE VISIT (OUTPATIENT)
Dept: PRIMARY CARE CLINIC | Age: 82
End: 2022-04-01
Payer: MEDICARE

## 2022-04-01 VITALS
RESPIRATION RATE: 18 BRPM | HEIGHT: 67 IN | OXYGEN SATURATION: 97 % | SYSTOLIC BLOOD PRESSURE: 100 MMHG | BODY MASS INDEX: 27.78 KG/M2 | DIASTOLIC BLOOD PRESSURE: 64 MMHG | HEART RATE: 100 BPM | WEIGHT: 177 LBS | TEMPERATURE: 98.1 F

## 2022-04-01 DIAGNOSIS — A63.0 GENITAL WARTS: ICD-10-CM

## 2022-04-01 DIAGNOSIS — M15.9 PRIMARY OSTEOARTHRITIS INVOLVING MULTIPLE JOINTS: ICD-10-CM

## 2022-04-01 DIAGNOSIS — E11.42 TYPE 2 DIABETES MELLITUS WITH DIABETIC POLYNEUROPATHY, WITHOUT LONG-TERM CURRENT USE OF INSULIN (HCC): Primary | ICD-10-CM

## 2022-04-01 DIAGNOSIS — I25.118 CORONARY ARTERY DISEASE OF NATIVE HEART WITH STABLE ANGINA PECTORIS, UNSPECIFIED VESSEL OR LESION TYPE (HCC): ICD-10-CM

## 2022-04-01 DIAGNOSIS — E11.65 TYPE 2 DIABETES MELLITUS WITH HYPERGLYCEMIA, WITHOUT LONG-TERM CURRENT USE OF INSULIN (HCC): ICD-10-CM

## 2022-04-01 DIAGNOSIS — J44.9 CHRONIC OBSTRUCTIVE PULMONARY DISEASE, UNSPECIFIED COPD TYPE (HCC): ICD-10-CM

## 2022-04-01 DIAGNOSIS — F51.01 PRIMARY INSOMNIA: ICD-10-CM

## 2022-04-01 DIAGNOSIS — I10 PRIMARY HYPERTENSION: ICD-10-CM

## 2022-04-01 DIAGNOSIS — E11.42 DIABETIC POLYNEUROPATHY ASSOCIATED WITH TYPE 2 DIABETES MELLITUS (HCC): ICD-10-CM

## 2022-04-01 DIAGNOSIS — E78.49 OTHER HYPERLIPIDEMIA: ICD-10-CM

## 2022-04-01 PROCEDURE — 3023F SPIROM DOC REV: CPT | Performed by: NURSE PRACTITIONER

## 2022-04-01 PROCEDURE — 4040F PNEUMOC VAC/ADMIN/RCVD: CPT | Performed by: NURSE PRACTITIONER

## 2022-04-01 PROCEDURE — 99214 OFFICE O/P EST MOD 30 MIN: CPT | Performed by: NURSE PRACTITIONER

## 2022-04-01 PROCEDURE — G8427 DOCREV CUR MEDS BY ELIG CLIN: HCPCS | Performed by: NURSE PRACTITIONER

## 2022-04-01 PROCEDURE — 4004F PT TOBACCO SCREEN RCVD TLK: CPT | Performed by: NURSE PRACTITIONER

## 2022-04-01 PROCEDURE — G8417 CALC BMI ABV UP PARAM F/U: HCPCS | Performed by: NURSE PRACTITIONER

## 2022-04-01 PROCEDURE — 1123F ACP DISCUSS/DSCN MKR DOCD: CPT | Performed by: NURSE PRACTITIONER

## 2022-04-01 RX ORDER — CELECOXIB 100 MG/1
100 CAPSULE ORAL 2 TIMES DAILY
Qty: 180 CAPSULE | Refills: 1 | Status: SHIPPED
Start: 2022-04-01 | End: 2022-09-19

## 2022-04-01 RX ORDER — ROSUVASTATIN CALCIUM 20 MG/1
20 TABLET, COATED ORAL NIGHTLY
Qty: 90 TABLET | Refills: 1 | Status: SHIPPED | OUTPATIENT
Start: 2022-04-01 | End: 2022-09-28

## 2022-04-01 RX ORDER — GABAPENTIN 300 MG/1
300 CAPSULE ORAL 2 TIMES DAILY
Qty: 180 CAPSULE | Refills: 1 | Status: SHIPPED | OUTPATIENT
Start: 2022-04-01 | End: 2022-09-28

## 2022-04-01 RX ORDER — IMIQUIMOD 12.5 MG/.25G
CREAM TOPICAL
Qty: 12 EACH | Refills: 1 | Status: SHIPPED | OUTPATIENT
Start: 2022-04-01 | End: 2022-04-08

## 2022-04-01 RX ORDER — GLIPIZIDE 5 MG/1
5 TABLET ORAL
Qty: 180 TABLET | Refills: 1 | Status: SHIPPED | OUTPATIENT
Start: 2022-04-01 | End: 2022-09-28

## 2022-04-01 RX ORDER — TRAZODONE HYDROCHLORIDE 50 MG/1
50 TABLET ORAL NIGHTLY PRN
Qty: 30 TABLET | Refills: 2 | Status: SHIPPED
Start: 2022-04-01 | End: 2022-06-27

## 2022-04-01 SDOH — ECONOMIC STABILITY: FOOD INSECURITY: WITHIN THE PAST 12 MONTHS, THE FOOD YOU BOUGHT JUST DIDN'T LAST AND YOU DIDN'T HAVE MONEY TO GET MORE.: NEVER TRUE

## 2022-04-01 SDOH — ECONOMIC STABILITY: FOOD INSECURITY: WITHIN THE PAST 12 MONTHS, YOU WORRIED THAT YOUR FOOD WOULD RUN OUT BEFORE YOU GOT MONEY TO BUY MORE.: NEVER TRUE

## 2022-04-01 ASSESSMENT — ENCOUNTER SYMPTOMS
COUGH: 0
PHOTOPHOBIA: 0
SINUS PAIN: 0
ABDOMINAL PAIN: 0
EYE ITCHING: 0
RHINORRHEA: 0
BLOOD IN STOOL: 0
COLOR CHANGE: 0
TROUBLE SWALLOWING: 0
ABDOMINAL DISTENTION: 0
CHEST TIGHTNESS: 0
NAUSEA: 0
SINUS PRESSURE: 0
WHEEZING: 0
CONSTIPATION: 0
CHOKING: 0
EYE PAIN: 0
FACIAL SWELLING: 0
DIARRHEA: 0
SORE THROAT: 0
SHORTNESS OF BREATH: 1
EYE DISCHARGE: 0
EYE REDNESS: 0
BACK PAIN: 0
VOMITING: 0
VOICE CHANGE: 0

## 2022-04-01 ASSESSMENT — SOCIAL DETERMINANTS OF HEALTH (SDOH): HOW HARD IS IT FOR YOU TO PAY FOR THE VERY BASICS LIKE FOOD, HOUSING, MEDICAL CARE, AND HEATING?: NOT VERY HARD

## 2022-04-01 ASSESSMENT — VISUAL ACUITY: OU: 1

## 2022-04-01 NOTE — PROGRESS NOTES
22  Kia Cook : 1940 Sex: male  Age: 80 y.o. Chief Complaint   Patient presents with    Hypertension     6 mo follow up, no labs, he still has pain left shoulder nad right knee, he is wondering if he can have gabapentin 300mg instead of 100 mg prescription       Hernandez Lopez is seen today for follow-up and review of his overall condition plan of care. He had not been seen for an extended period of time, which he states is related to his chronic pain. He reports that he is having chronic arthralgias to bilateral knees and states that he has a bad rotator cuff, to his left shoulder. He reports that he is in chronic pain, all the time. He also reports that he got genital warts from a woman, 20 years ago, and states that he recently has had an outbreak. Lastly, he reports increased shortness of breath with exertion. He states this is related to his pain. He denies any acute confusion, forgetfulness, any change in cognition. Review of Systems   Constitutional: Negative for appetite change, chills, diaphoresis, fatigue, fever and unexpected weight change. HENT: Negative for congestion, ear pain, facial swelling, hearing loss, nosebleeds, postnasal drip, rhinorrhea, sinus pressure, sinus pain, sneezing, sore throat, tinnitus, trouble swallowing and voice change. Eyes: Negative for photophobia, pain, discharge, redness and itching. Respiratory: Positive for shortness of breath (Chronic issue). Negative for cough, choking, chest tightness and wheezing. Cardiovascular: Negative for chest pain, palpitations and leg swelling. Gastrointestinal: Negative for abdominal distention, abdominal pain, blood in stool, constipation, diarrhea, nausea and vomiting. Endocrine: Negative for cold intolerance, heat intolerance, polydipsia, polyphagia and polyuria. Genitourinary: Negative for difficulty urinating, dysuria, flank pain, frequency, hematuria and urgency.         He reports that he contracted genital warts 20 years ago and reports that they have returned. Musculoskeletal: Positive for arthralgias (Left shoulder and right) and gait problem (Using a cane for ambulatory assistance). Negative for back pain, joint swelling, myalgias, neck pain and neck stiffness. Skin: Negative for color change, rash and wound. Allergic/Immunologic: Negative for environmental allergies and food allergies. Neurological: Negative for dizziness, tremors, seizures, syncope, facial asymmetry, speech difficulty, weakness, light-headedness, numbness and headaches. Hematological: Does not bruise/bleed easily. Psychiatric/Behavioral: Negative for agitation, behavioral problems, confusion, decreased concentration, hallucinations, self-injury, sleep disturbance and suicidal ideas. The patient is not nervous/anxious. Current Outpatient Medications:     magnesium oxide (MAG-OX) 400 (241.3 Mg) MG TABS tablet, Take 1 tablet by mouth daily, Disp: 90 tablet, Rfl: 1    glipiZIDE (GLUCOTROL) 5 MG tablet, Take 1 tablet by mouth 2 times daily (before meals), Disp: 180 tablet, Rfl: 1    gabapentin (NEURONTIN) 300 MG capsule, Take 1 capsule by mouth 2 times daily for 180 days. , Disp: 180 capsule, Rfl: 1    rosuvastatin (CRESTOR) 20 MG tablet, Take 1 tablet by mouth nightly, Disp: 90 tablet, Rfl: 1    metoprolol tartrate (LOPRESSOR) 25 MG tablet, Take 1 tablet by mouth 2 times daily, Disp: 180 tablet, Rfl: 1    celecoxib (CELEBREX) 100 MG capsule, Take 1 capsule by mouth 2 times daily, Disp: 180 capsule, Rfl: 1    imiquimod (ALDARA) 5 % cream, Apply topically three times a week., Disp: 12 each, Rfl: 1    traZODone (DESYREL) 50 MG tablet, Take 1 tablet by mouth nightly as needed for Sleep, Disp: 30 tablet, Rfl: 2    Cholecalciferol (VITAMIN D) 50 MCG (2000 UT) CAPS capsule, Take by mouth, Disp: , Rfl:     aspirin 81 MG EC tablet, Take 1 tablet by mouth daily, Disp: 30 tablet, Rfl: 3    zoster recombinant Activity    Alcohol use: Not Currently    Drug use: Never    Sexual activity: Not on file   Other Topics Concern    Not on file   Social History Narrative    Not on file     Social Determinants of Health     Financial Resource Strain: Low Risk     Difficulty of Paying Living Expenses: Not very hard   Food Insecurity: No Food Insecurity    Worried About Running Out of Food in the Last Year: Never true    Louis of Food in the Last Year: Never true   Transportation Needs:     Lack of Transportation (Medical): Not on file    Lack of Transportation (Non-Medical): Not on file   Physical Activity:     Days of Exercise per Week: Not on file    Minutes of Exercise per Session: Not on file   Stress:     Feeling of Stress : Not on file   Social Connections:     Frequency of Communication with Friends and Family: Not on file    Frequency of Social Gatherings with Friends and Family: Not on file    Attends Spiritism Services: Not on file    Active Member of Clubs or Organizations: Not on file    Attends Club or Organization Meetings: Not on file    Marital Status: Not on file   Intimate Partner Violence:     Fear of Current or Ex-Partner: Not on file    Emotionally Abused: Not on file    Physically Abused: Not on file    Sexually Abused: Not on file   Housing Stability:     Unable to Pay for Housing in the Last Year: Not on file    Number of [place] in the Last Year: Not on file    Unstable Housing in the Last Year: Not on file       Vitals:    04/01/22 1312   BP: 100/64   Site: Right Upper Arm   Position: Sitting   Cuff Size: Medium Adult   Pulse: 100   Resp: 18   Temp: 98.1 °F (36.7 °C)   TempSrc: Temporal   SpO2: 97%   Weight: 177 lb (80.3 kg)   Height: 5' 7\" (1.702 m)       Physical Exam  Vitals and nursing note reviewed. Constitutional:       General: He is awake. He is not in acute distress. Appearance: Normal appearance. He is well-developed. He is obese.  He is not ill-appearing, toxic-appearing or diaphoretic. HENT:      Head: Normocephalic and atraumatic. Right Ear: Hearing and external ear normal. There is no impacted cerumen. Left Ear: Hearing and external ear normal. There is no impacted cerumen. Nose: Nose normal. No congestion or rhinorrhea. Mouth/Throat:      Lips: Pink. No lesions. Mouth: Mucous membranes are moist.      Pharynx: Oropharynx is clear. No oropharyngeal exudate or posterior oropharyngeal erythema. Eyes:      General: Lids are normal. Vision grossly intact. Gaze aligned appropriately. No scleral icterus. Right eye: No discharge. Left eye: No discharge. Extraocular Movements: Extraocular movements intact. Conjunctiva/sclera: Conjunctivae normal.      Right eye: Right conjunctiva is not injected. Left eye: Left conjunctiva is not injected. Pupils: Pupils are equal, round, and reactive to light. Neck:      Thyroid: No thyromegaly. Vascular: No carotid bruit. Trachea: Trachea normal.   Cardiovascular:      Rate and Rhythm: Normal rate and regular rhythm. Pulses: Normal pulses. Heart sounds: S1 normal and S2 normal. Murmur heard. Systolic murmur is present with a grade of 2/6. No friction rub. No gallop. Pulmonary:      Effort: Pulmonary effort is normal. No tachypnea, accessory muscle usage or respiratory distress. Breath sounds: Normal breath sounds and air entry. No stridor. No wheezing, rhonchi or rales. Chest:      Chest wall: No tenderness. Abdominal:      General: Bowel sounds are normal. There is no distension. Palpations: Abdomen is soft. There is no mass. Tenderness: There is no abdominal tenderness. There is no right CVA tenderness, left CVA tenderness, guarding or rebound. Hernia: No hernia is present. Genitourinary:     Penis: Normal and circumcised.        Testes: Normal.       Musculoskeletal:         General: No swelling, tenderness, deformity or signs of injury. Normal range of motion. Cervical back: Full passive range of motion without pain, normal range of motion and neck supple. No rigidity. No muscular tenderness. Right lower leg: No edema. Left lower leg: No edema. Lymphadenopathy:      Cervical: No cervical adenopathy. Skin:     General: Skin is warm and dry. Capillary Refill: Capillary refill takes less than 2 seconds. Coloration: Skin is not jaundiced or pale. Findings: No bruising, erythema, lesion or rash. Neurological:      General: No focal deficit present. Mental Status: He is alert and oriented to person, place, and time. Mental status is at baseline. Cranial Nerves: Cranial nerves are intact. No cranial nerve deficit. Sensory: Sensation is intact. No sensory deficit. Motor: Motor function is intact. No weakness. Coordination: Coordination is intact. Coordination normal.      Gait: Gait abnormal.      Deep Tendon Reflexes: Reflexes normal.   Psychiatric:         Attention and Perception: Attention and perception normal.         Mood and Affect: Mood and affect normal.         Speech: Speech normal.         Behavior: Behavior normal. Behavior is cooperative. Thought Content: Thought content normal.         Cognition and Memory: Cognition and memory normal.         Judgment: Judgment normal.         Assessment and Plan:  Veronica Chandler was seen today for hypertension. Diagnoses and all orders for this visit:    Type 2 diabetes mellitus with diabetic polyneuropathy, without long-term current use of insulin (HCC)  -     glipiZIDE (GLUCOTROL) 5 MG tablet; Take 1 tablet by mouth 2 times daily (before meals)    Primary osteoarthritis involving multiple joints  -     celecoxib (CELEBREX) 100 MG capsule; Take 1 capsule by mouth 2 times daily    Primary hypertension  -     metoprolol tartrate (LOPRESSOR) 25 MG tablet;  Take 1 tablet by mouth 2 times daily    Other hyperlipidemia  - rosuvastatin (CRESTOR) 20 MG tablet; Take 1 tablet by mouth nightly    Genital warts  -     imiquimod (ALDARA) 5 % cream; Apply topically three times a week. Chronic obstructive pulmonary disease, unspecified COPD type ([facility]    Coronary artery disease of native heart with stable angina pectoris, unspecified vessel or lesion type ([facility]    Type 2 diabetes mellitus with hyperglycemia, without long-term current use of insulin (HCC)  -     magnesium oxide (MAG-OX) 400 (241.3 Mg) MG TABS tablet; Take 1 tablet by mouth daily    Primary insomnia  -     traZODone (DESYREL) 50 MG tablet; Take 1 tablet by mouth nightly as needed for Sleep    Diabetic polyneuropathy associated with type 2 diabetes mellitus (HCC)  -     gabapentin (NEURONTIN) 300 MG capsule; Take 1 capsule by mouth 2 times daily for 180 days. Discussions/Education provided to patients during visit:  [] Discussed the importance to stop smoking. [x] Advised to monitor eating habits. [] Reviewed and discussed Imaging results. [] Reviewed and discussed Lab results. [x] Discussed the importance of drinking plenty of fluids. [x] Cut down on Salt, Caffeine, and Sugar. [x] Continue Medications as Discussed. [x] Communicated with patient any concerns, to phone office. Return in about 6 months (around 10/1/2022). I spent 25 minutes face-to-face with this patient.       Seen By:  WILVER Queen NP

## 2022-06-27 DIAGNOSIS — F51.01 PRIMARY INSOMNIA: ICD-10-CM

## 2022-06-27 RX ORDER — TRAZODONE HYDROCHLORIDE 50 MG/1
TABLET ORAL
Qty: 90 TABLET | Refills: 1 | Status: SHIPPED | OUTPATIENT
Start: 2022-06-27

## 2022-09-17 DIAGNOSIS — M15.9 PRIMARY OSTEOARTHRITIS INVOLVING MULTIPLE JOINTS: ICD-10-CM

## 2022-09-19 RX ORDER — CELECOXIB 100 MG/1
CAPSULE ORAL
Qty: 60 CAPSULE | Refills: 0 | Status: SHIPPED | OUTPATIENT
Start: 2022-09-19

## 2022-09-28 DIAGNOSIS — I10 PRIMARY HYPERTENSION: ICD-10-CM

## 2022-09-28 DIAGNOSIS — E11.42 TYPE 2 DIABETES MELLITUS WITH DIABETIC POLYNEUROPATHY, WITHOUT LONG-TERM CURRENT USE OF INSULIN (HCC): ICD-10-CM

## 2022-09-28 DIAGNOSIS — E78.49 OTHER HYPERLIPIDEMIA: ICD-10-CM

## 2022-09-28 RX ORDER — GLIPIZIDE 5 MG/1
TABLET ORAL
Qty: 180 TABLET | Refills: 0 | OUTPATIENT
Start: 2022-09-28

## 2022-09-28 RX ORDER — ROSUVASTATIN CALCIUM 20 MG/1
TABLET, COATED ORAL
Qty: 90 TABLET | Refills: 0 | OUTPATIENT
Start: 2022-09-28

## 2022-09-29 DIAGNOSIS — E11.42 DIABETIC POLYNEUROPATHY ASSOCIATED WITH TYPE 2 DIABETES MELLITUS (HCC): ICD-10-CM

## 2022-09-29 RX ORDER — GABAPENTIN 300 MG/1
CAPSULE ORAL
Qty: 180 CAPSULE | Refills: 0 | OUTPATIENT
Start: 2022-09-29 | End: 2022-12-28

## 2022-11-11 ENCOUNTER — OFFICE VISIT (OUTPATIENT)
Dept: PRIMARY CARE CLINIC | Age: 82
End: 2022-11-11
Payer: MEDICARE

## 2022-11-11 VITALS
TEMPERATURE: 98.2 F | OXYGEN SATURATION: 98 % | BODY MASS INDEX: 29.41 KG/M2 | SYSTOLIC BLOOD PRESSURE: 110 MMHG | WEIGHT: 187.8 LBS | DIASTOLIC BLOOD PRESSURE: 72 MMHG | HEART RATE: 100 BPM

## 2022-11-11 DIAGNOSIS — E11.42 TYPE 2 DIABETES MELLITUS WITH DIABETIC POLYNEUROPATHY, WITHOUT LONG-TERM CURRENT USE OF INSULIN (HCC): ICD-10-CM

## 2022-11-11 DIAGNOSIS — M15.9 PRIMARY OSTEOARTHRITIS INVOLVING MULTIPLE JOINTS: Primary | ICD-10-CM

## 2022-11-11 DIAGNOSIS — M17.11 ARTHRITIS OF RIGHT KNEE: ICD-10-CM

## 2022-11-11 DIAGNOSIS — E83.42 HYPOMAGNESEMIA: ICD-10-CM

## 2022-11-11 DIAGNOSIS — E11.42 DIABETIC POLYNEUROPATHY ASSOCIATED WITH TYPE 2 DIABETES MELLITUS (HCC): ICD-10-CM

## 2022-11-11 DIAGNOSIS — E78.49 OTHER HYPERLIPIDEMIA: ICD-10-CM

## 2022-11-11 DIAGNOSIS — I10 PRIMARY HYPERTENSION: ICD-10-CM

## 2022-11-11 PROCEDURE — G8484 FLU IMMUNIZE NO ADMIN: HCPCS | Performed by: NURSE PRACTITIONER

## 2022-11-11 PROCEDURE — G8417 CALC BMI ABV UP PARAM F/U: HCPCS | Performed by: NURSE PRACTITIONER

## 2022-11-11 PROCEDURE — 4004F PT TOBACCO SCREEN RCVD TLK: CPT | Performed by: NURSE PRACTITIONER

## 2022-11-11 PROCEDURE — G8427 DOCREV CUR MEDS BY ELIG CLIN: HCPCS | Performed by: NURSE PRACTITIONER

## 2022-11-11 PROCEDURE — 3078F DIAST BP <80 MM HG: CPT | Performed by: NURSE PRACTITIONER

## 2022-11-11 PROCEDURE — 3074F SYST BP LT 130 MM HG: CPT | Performed by: NURSE PRACTITIONER

## 2022-11-11 PROCEDURE — 1123F ACP DISCUSS/DSCN MKR DOCD: CPT | Performed by: NURSE PRACTITIONER

## 2022-11-11 PROCEDURE — 99214 OFFICE O/P EST MOD 30 MIN: CPT | Performed by: NURSE PRACTITIONER

## 2022-11-11 RX ORDER — GLIPIZIDE 5 MG/1
5 TABLET ORAL
Qty: 180 TABLET | Refills: 1 | Status: SHIPPED | OUTPATIENT
Start: 2022-11-11 | End: 2023-05-10

## 2022-11-11 RX ORDER — MAGNESIUM OXIDE 400 MG/1
400 TABLET ORAL DAILY
Qty: 90 TABLET | Refills: 1 | Status: SHIPPED | OUTPATIENT
Start: 2022-11-11

## 2022-11-11 RX ORDER — ROSUVASTATIN CALCIUM 20 MG/1
20 TABLET, COATED ORAL NIGHTLY
Qty: 90 TABLET | Refills: 1 | Status: CANCELLED | OUTPATIENT
Start: 2022-11-11 | End: 2023-05-10

## 2022-11-11 RX ORDER — CELECOXIB 200 MG/1
200 CAPSULE ORAL 2 TIMES DAILY
Qty: 180 CAPSULE | Refills: 1 | Status: SHIPPED | OUTPATIENT
Start: 2022-11-11

## 2022-11-11 RX ORDER — MAGNESIUM OXIDE 400 MG/1
400 TABLET ORAL DAILY
COMMUNITY
End: 2022-11-11 | Stop reason: SDUPTHER

## 2022-11-11 RX ORDER — GABAPENTIN 300 MG/1
300 CAPSULE ORAL 2 TIMES DAILY
Qty: 180 CAPSULE | Refills: 1 | Status: SHIPPED | OUTPATIENT
Start: 2022-11-11 | End: 2023-05-10

## 2022-11-11 ASSESSMENT — ENCOUNTER SYMPTOMS
FACIAL SWELLING: 0
DIARRHEA: 0
EYE DISCHARGE: 0
SHORTNESS OF BREATH: 1
PHOTOPHOBIA: 0
EYE ITCHING: 0
BLOOD IN STOOL: 0
CONSTIPATION: 0
COUGH: 0
RHINORRHEA: 1
EYE PAIN: 0
EYE REDNESS: 0
ABDOMINAL PAIN: 0
SINUS PRESSURE: 0
BACK PAIN: 0
WHEEZING: 0
SORE THROAT: 0
VOMITING: 0
NAUSEA: 0
COLOR CHANGE: 0
ABDOMINAL DISTENTION: 0
VOICE CHANGE: 0
TROUBLE SWALLOWING: 0
SINUS PAIN: 0
CHOKING: 0
CHEST TIGHTNESS: 0

## 2022-11-11 ASSESSMENT — PATIENT HEALTH QUESTIONNAIRE - PHQ9
SUM OF ALL RESPONSES TO PHQ QUESTIONS 1-9: 0
2. FEELING DOWN, DEPRESSED OR HOPELESS: 0
1. LITTLE INTEREST OR PLEASURE IN DOING THINGS: 0
SUM OF ALL RESPONSES TO PHQ QUESTIONS 1-9: 0
SUM OF ALL RESPONSES TO PHQ QUESTIONS 1-9: 0
SUM OF ALL RESPONSES TO PHQ9 QUESTIONS 1 & 2: 0
SUM OF ALL RESPONSES TO PHQ QUESTIONS 1-9: 0

## 2022-11-11 NOTE — PROGRESS NOTES
22  Starlette Points : 1940 Sex: male  Age: [de-identified] y.o. Chief Complaint   Patient presents with    Follow-up     7 month      Medication Refill     Sleep meds did not help, wants to increase celebrex to 200 mg        Gladis Harrington is seen today for follow-up and review of his overall condition plan of care. He reports he is doing fairly well today, but states his arthritic type pains have increased and the Celebrex 100 mg twice daily is not helping. He is asking for an increase in this medication or change of medicines. He states that he knows he is in need of a knee replacement and an evaluation of his left shoulder, but states that he will not have any surgery completed and he will just deal with it. Also, I spoke with him about getting lab work, as it has been over 3 years since he has had routine blood work. He has been in the hospital and had blood work drawn with them, multiple times, with the most recent being from 2021. He states that he will get them done before his next visit. He denies any acute confusion, forgetfulness or any change in cognition. Review of Systems   Constitutional:  Negative for appetite change, chills, diaphoresis, fatigue, fever and unexpected weight change. HENT:  Positive for congestion and rhinorrhea. Negative for ear pain, facial swelling, hearing loss, nosebleeds, postnasal drip, sinus pressure, sinus pain, sneezing, sore throat, tinnitus, trouble swallowing and voice change. Eyes:  Negative for photophobia, pain, discharge, redness and itching. Respiratory:  Positive for shortness of breath (Chronic issue). Negative for cough, choking, chest tightness and wheezing. Cardiovascular:  Negative for chest pain, palpitations and leg swelling. Gastrointestinal:  Negative for abdominal distention, abdominal pain, blood in stool, constipation, diarrhea, nausea and vomiting.    Endocrine: Negative for cold intolerance, heat intolerance, polydipsia, polyphagia and polyuria. Genitourinary:  Negative for difficulty urinating, dysuria, flank pain, frequency, hematuria and urgency. Musculoskeletal:  Positive for arthralgias (Hands, right shoulder and right knee) and gait problem (Due to right knee pain). Negative for back pain, joint swelling, myalgias, neck pain and neck stiffness. Skin:  Negative for color change, rash and wound. Allergic/Immunologic: Negative for environmental allergies and food allergies. Neurological:  Positive for weakness. Negative for dizziness, tremors, seizures, syncope, facial asymmetry, speech difficulty, light-headedness, numbness and headaches. Hematological:  Does not bruise/bleed easily. Psychiatric/Behavioral:  Negative for agitation, behavioral problems, confusion, decreased concentration, hallucinations, self-injury, sleep disturbance and suicidal ideas. The patient is not nervous/anxious. Current Outpatient Medications:     gabapentin (NEURONTIN) 300 MG capsule, Take 1 capsule by mouth 2 times daily for 180 days. , Disp: 180 capsule, Rfl: 1    glipiZIDE (GLUCOTROL) 5 MG tablet, Take 1 tablet by mouth 2 times daily (before meals), Disp: 180 tablet, Rfl: 1    metoprolol tartrate (LOPRESSOR) 25 MG tablet, Take 1 tablet by mouth 2 times daily, Disp: 180 tablet, Rfl: 1    magnesium oxide (MAG-OX) 400 MG tablet, Take 1 tablet by mouth daily, Disp: 90 tablet, Rfl: 1    celecoxib (CELEBREX) 200 MG capsule, Take 1 capsule by mouth 2 times daily, Disp: 180 capsule, Rfl: 1    traZODone (DESYREL) 50 MG tablet, take 1 tablet by mouth at bedtime if needed, Disp: 90 tablet, Rfl: 1    rosuvastatin (CRESTOR) 20 MG tablet, Take 1 tablet by mouth nightly, Disp: 90 tablet, Rfl: 1    Cholecalciferol (VITAMIN D) 50 MCG (2000 UT) CAPS capsule, Take by mouth, Disp: , Rfl:     aspirin 81 MG EC tablet, Take 1 tablet by mouth daily, Disp: 30 tablet, Rfl: 3  Allergies   Allergen Reactions    Meperidine Hcl     Propoxyphene     Valium [Diazepam]        Past Medical History:   Diagnosis Date    Arthritis     CAD (coronary artery disease) 2021    Cancer (HCC)     COPD (chronic obstructive pulmonary disease) ([facility]     Diabetes mellitus ([facility]     HTN (hypertension) 2021    Hx of blood clots     Hyperlipidemia      Past Surgical History:   Procedure Laterality Date    BACK SURGERY      CHOLECYSTECTOMY      CORONARY ARTERY BYPASS GRAFT N/A 2021    CORONARY ARTERY BYPASS, ELISEO -- WANTS 0700 performed by Tomas Henson DO at [address] [place] [address] ENDOSCOPY N/A 2021    EGD DIAGNOSTIC ONLY performed by Meena Almonte MD at [facility] ENDOSCOPY     Family History   Problem Relation Age of Onset    Diabetes Mother      Social History     Socioeconomic History    Marital status:       Spouse name: Not on file    Number of children: Not on file    Years of education: Not on file    Highest education level: Not on file   Occupational History    Not on file   Tobacco Use    Smoking status: Former     Packs/day: 1.00     Years: 32.00     Pack years: 32.00     Types: Cigarettes     Start date:      Quit date:      Years since quittin.8    Smokeless tobacco: Current     Types: Snuff   Substance and Sexual Activity    Alcohol use: Not Currently    Drug use: Never    Sexual activity: Not on file   Other Topics Concern    Not on file   Social History Narrative    Not on file     Social Determinants of Health     Financial Resource Strain: Low Risk     Difficulty of Paying Living Expenses: Not very hard   Food Insecurity: No Food Insecurity    Worried About Running Out of Food in the Last Year: Never true    Ran Out of Food in the Last Year: Never true   Transportation Needs: Not on file   Physical Activity: Not on file   Stress: Not on file   Social Connections: Not on file   Intimate Partner Violence: Not on file   Housing Stability: Not on file       Vitals:    22 1354   BP: 110/72 Site: Left Upper Arm   Position: Sitting   Cuff Size: Large Adult   Pulse: 100   Temp: 98.2 °F (36.8 °C)   TempSrc: Temporal   SpO2: 98%   Weight: 187 lb 12.8 oz (85.2 kg)       Physical Exam  Vitals and nursing note reviewed. Constitutional:       General: He is awake. He is not in acute distress. Appearance: Normal appearance. He is well-developed. He is obese. He is not ill-appearing, toxic-appearing or diaphoretic. HENT:      Head: Normocephalic and atraumatic. Right Ear: Hearing and external ear normal. There is no impacted cerumen. Left Ear: Hearing and external ear normal. There is no impacted cerumen. Nose: Nose normal. No congestion or rhinorrhea. Mouth/Throat:      Lips: Pink. No lesions. Mouth: Mucous membranes are moist.      Pharynx: Oropharynx is clear. No oropharyngeal exudate or posterior oropharyngeal erythema. Eyes:      General: Lids are normal. Vision grossly intact. Gaze aligned appropriately. No scleral icterus. Right eye: No discharge. Left eye: No discharge. Extraocular Movements: Extraocular movements intact. Conjunctiva/sclera: Conjunctivae normal.      Right eye: Right conjunctiva is not injected. Left eye: Left conjunctiva is not injected. Pupils: Pupils are equal, round, and reactive to light. Neck:      Thyroid: No thyromegaly. Vascular: No carotid bruit. Trachea: Trachea normal.   Cardiovascular:      Rate and Rhythm: Normal rate and regular rhythm. Pulses: Normal pulses. Heart sounds: S1 normal and S2 normal. Murmur heard. Systolic murmur is present with a grade of 2/6. No friction rub. No gallop. Pulmonary:      Effort: Pulmonary effort is normal. No tachypnea, accessory muscle usage or respiratory distress. Breath sounds: Normal breath sounds and air entry. No stridor. No wheezing, rhonchi or rales. Chest:      Chest wall: No tenderness.    Abdominal:      General: Bowel sounds are normal. There is no distension. Palpations: Abdomen is soft. There is no mass. Tenderness: There is no abdominal tenderness. There is no right CVA tenderness, left CVA tenderness, guarding or rebound. Hernia: No hernia is present. Musculoskeletal:         General: No swelling, tenderness, deformity or signs of injury. Normal range of motion. Cervical back: Full passive range of motion without pain, normal range of motion and neck supple. No rigidity. No muscular tenderness. Right lower leg: No edema. Left lower leg: No edema. Lymphadenopathy:      Cervical: No cervical adenopathy. Skin:     General: Skin is warm and dry. Capillary Refill: Capillary refill takes less than 2 seconds. Coloration: Skin is not jaundiced or pale. Findings: No bruising, erythema, lesion or rash. Neurological:      General: No focal deficit present. Mental Status: He is alert and oriented to person, place, and time. Mental status is at baseline. Cranial Nerves: Cranial nerves are intact. No cranial nerve deficit. Sensory: Sensation is intact. No sensory deficit. Motor: Motor function is intact. No weakness. Coordination: Coordination is intact. Coordination normal.      Gait: Gait abnormal.      Deep Tendon Reflexes: Reflexes normal.   Psychiatric:         Attention and Perception: Attention and perception normal.         Mood and Affect: Mood and affect normal.         Speech: Speech normal.         Behavior: Behavior normal. Behavior is cooperative. Thought Content: Thought content normal.         Cognition and Memory: Cognition and memory normal.         Judgment: Judgment normal.     Assessment and Plan:  Uriel Church was seen today for follow-up and medication refill.     Diagnoses and all orders for this visit:    Primary osteoarthritis involving multiple joints  Comments:  Chronic, uncontrolled  Orders:  -     celecoxib (CELEBREX) 200 MG capsule; Take 1 capsule by mouth 2 times daily    Diabetic polyneuropathy associated with type 2 diabetes mellitus ([facility]  Comments:  Chronic, stable  Orders:  -     gabapentin (NEURONTIN) 300 MG capsule; Take 1 capsule by mouth 2 times daily for 180 days. Type 2 diabetes mellitus with diabetic polyneuropathy, without long-term current use of insulin ([facility])  Comments:  Chronic, stable  Orders:  -     glipiZIDE (GLUCOTROL) 5 MG tablet; Take 1 tablet by mouth 2 times daily (before meals)    Other hyperlipidemia  Comments:  Chronic, stable    Primary hypertension  Comments:  Chronic, stable  Orders:  -     metoprolol tartrate (LOPRESSOR) 25 MG tablet; Take 1 tablet by mouth 2 times daily    Arthritis of right knee  Comments:  Chronic, stable    Hypomagnesemia  Comments:  Chronic, stable  Orders:  -     magnesium oxide (MAG-OX) 400 MG tablet; Take 1 tablet by mouth daily    *Reese Valladares is seen today for follow-up and review of his overall condition plan of care. He reports he is doing fairly well today, but states his arthritic type pains have increased and the Celebrex 100 mg twice daily is not helping. He is asking for an increase in this medication or change of medicines. He states that he knows he is in need of a knee replacement and an evaluation of his left shoulder, but states that he will not have any surgery completed and he will just deal with it. Also, I spoke with him about getting lab work, as it has been over 3 years since he has had routine blood work. He has been in the hospital and had blood work drawn with them, multiple times, with the most recent being from July 2021. He states that he will get them done before his next visit. He denies any acute confusion, forgetfulness or any change in cognition. Discussions/Education provided to patients during visit:  [] Discussed the importance to stop smoking. [x] Advised to monitor eating habits. [] Reviewed and discussed Imaging results.   [] Reviewed and discussed Lab results. [x] Discussed the importance of drinking plenty of fluids. [] Cut down on Salt, Caffeine, and Sugar. [x] Continue Medications as Discussed. [x] Communicated with patient any concerns, to phone office. Return in about 6 months (around 5/11/2023) for Review of chronic conditions. I spent  30 minutes  face-to-face with this patient.       Seen By:  WILVER Marrufo NP

## 2023-01-04 DIAGNOSIS — F51.01 PRIMARY INSOMNIA: ICD-10-CM

## 2023-01-04 RX ORDER — TRAZODONE HYDROCHLORIDE 50 MG/1
TABLET ORAL
Qty: 90 TABLET | Refills: 1 | Status: SHIPPED | OUTPATIENT
Start: 2023-01-04

## 2023-04-10 PROBLEM — F33.9 MAJOR DEPRESSIVE DISORDER, RECURRENT, UNSPECIFIED (HCC): Status: ACTIVE | Noted: 2023-04-10

## 2023-04-10 PROBLEM — F33.1 MAJOR DEPRESSIVE DISORDER, RECURRENT, MODERATE (HCC): Status: ACTIVE | Noted: 2023-04-10

## 2023-04-10 PROBLEM — F33.0 MAJOR DEPRESSIVE DISORDER, RECURRENT, MILD (HCC): Status: ACTIVE | Noted: 2023-04-10

## 2023-04-19 ENCOUNTER — TELEPHONE (OUTPATIENT)
Dept: PRIMARY CARE CLINIC | Age: 83
End: 2023-04-19

## 2023-05-05 DIAGNOSIS — Z12.5 SCREENING FOR PROSTATE CANCER: ICD-10-CM

## 2023-05-05 DIAGNOSIS — E11.42 TYPE 2 DIABETES MELLITUS WITH DIABETIC POLYNEUROPATHY, WITHOUT LONG-TERM CURRENT USE OF INSULIN (HCC): ICD-10-CM

## 2023-05-05 DIAGNOSIS — I10 PRIMARY HYPERTENSION: Primary | ICD-10-CM

## 2023-05-05 DIAGNOSIS — I10 PRIMARY HYPERTENSION: ICD-10-CM

## 2023-05-05 RX ORDER — GLIPIZIDE 5 MG/1
TABLET ORAL
Qty: 60 TABLET | Refills: 0 | Status: SHIPPED | OUTPATIENT
Start: 2023-05-05

## 2023-05-05 NOTE — TELEPHONE ENCOUNTER
He needs to set up an appointment and get labs, prior to that visit. I did put lab orders in. This will be the last time I send these medications without him having an appointment.

## 2023-05-15 NOTE — TELEPHONE ENCOUNTER
Pharmacy requesting 90 days of Metoprolol and Glipizide    LM on patient's vm needs an appt.  Before we give 90 days

## 2023-06-06 DIAGNOSIS — I10 PRIMARY HYPERTENSION: ICD-10-CM

## 2023-06-06 DIAGNOSIS — E11.42 TYPE 2 DIABETES MELLITUS WITH DIABETIC POLYNEUROPATHY, WITHOUT LONG-TERM CURRENT USE OF INSULIN (HCC): ICD-10-CM

## 2023-06-06 RX ORDER — GLIPIZIDE 5 MG/1
TABLET ORAL
Qty: 60 TABLET | Refills: 0 | OUTPATIENT
Start: 2023-06-06

## 2023-06-09 DIAGNOSIS — I10 PRIMARY HYPERTENSION: ICD-10-CM

## 2023-06-09 DIAGNOSIS — E11.42 DIABETIC POLYNEUROPATHY ASSOCIATED WITH TYPE 2 DIABETES MELLITUS (HCC): ICD-10-CM

## 2023-06-09 DIAGNOSIS — E11.42 TYPE 2 DIABETES MELLITUS WITH DIABETIC POLYNEUROPATHY, WITHOUT LONG-TERM CURRENT USE OF INSULIN (HCC): ICD-10-CM

## 2023-06-09 RX ORDER — GLIPIZIDE 5 MG/1
TABLET ORAL
Qty: 60 TABLET | Refills: 0 | Status: SHIPPED | OUTPATIENT
Start: 2023-06-09

## 2023-06-09 RX ORDER — GABAPENTIN 300 MG/1
300 CAPSULE ORAL 2 TIMES DAILY
Qty: 60 CAPSULE | Refills: 0 | Status: SHIPPED | OUTPATIENT
Start: 2023-06-09 | End: 2023-07-09

## 2023-06-09 NOTE — TELEPHONE ENCOUNTER
I wrote for him 30 days of this medication. He has a habit of not coming to his appointments, when his medications are sent in.

## 2023-06-09 NOTE — TELEPHONE ENCOUNTER
Patients last appointment 11/11/2022. Patients next scheduled appointment   Future Appointments   Date Time Provider Dl Guzmán   6/19/2023  2:45 PM WILVER Egan  [facility]     Has an upcoming appt.  Refill

## 2023-06-09 NOTE — TELEPHONE ENCOUNTER
Patient stopped in office to request med refills. Last ov 11/11/22  Next ov 6/19/23    Patient did not have any upcoming appointments. Scheduled appointment with patient. He was very unhappy with me for scheduling him an appointment. He does not feel it is needed to get his medications refilled.

## 2023-06-10 DIAGNOSIS — M15.9 PRIMARY OSTEOARTHRITIS INVOLVING MULTIPLE JOINTS: ICD-10-CM

## 2023-06-12 RX ORDER — CELECOXIB 200 MG/1
CAPSULE ORAL
Qty: 60 CAPSULE | Refills: 0 | Status: SHIPPED
Start: 2023-06-12 | End: 2023-06-19 | Stop reason: SDUPTHER

## 2023-06-12 NOTE — TELEPHONE ENCOUNTER
Patients last appointment 11/11/2022.   Patients next scheduled appointment   Future Appointments   Date Time Provider [address]   6/19/2023  2:45 PM WILVER Yusuf NP [facility]     Refill

## 2023-06-19 ENCOUNTER — OFFICE VISIT (OUTPATIENT)
Dept: PRIMARY CARE CLINIC | Age: 83
End: 2023-06-19
Payer: MEDICARE

## 2023-06-19 VITALS
TEMPERATURE: 98.8 F | DIASTOLIC BLOOD PRESSURE: 60 MMHG | OXYGEN SATURATION: 96 % | BODY MASS INDEX: 29.95 KG/M2 | SYSTOLIC BLOOD PRESSURE: 110 MMHG | WEIGHT: 191.2 LBS | HEART RATE: 110 BPM

## 2023-06-19 DIAGNOSIS — E11.42 DIABETIC POLYNEUROPATHY ASSOCIATED WITH TYPE 2 DIABETES MELLITUS (HCC): ICD-10-CM

## 2023-06-19 DIAGNOSIS — M15.9 PRIMARY OSTEOARTHRITIS INVOLVING MULTIPLE JOINTS: ICD-10-CM

## 2023-06-19 DIAGNOSIS — J44.9 CHRONIC OBSTRUCTIVE PULMONARY DISEASE, UNSPECIFIED COPD TYPE (HCC): ICD-10-CM

## 2023-06-19 DIAGNOSIS — R06.02 SOB (SHORTNESS OF BREATH) ON EXERTION: Primary | ICD-10-CM

## 2023-06-19 DIAGNOSIS — L29.9 GENERALIZED PRURITUS: ICD-10-CM

## 2023-06-19 DIAGNOSIS — E78.49 OTHER HYPERLIPIDEMIA: ICD-10-CM

## 2023-06-19 DIAGNOSIS — E83.42 HYPOMAGNESEMIA: ICD-10-CM

## 2023-06-19 DIAGNOSIS — I10 PRIMARY HYPERTENSION: ICD-10-CM

## 2023-06-19 DIAGNOSIS — E11.42 TYPE 2 DIABETES MELLITUS WITH DIABETIC POLYNEUROPATHY, WITHOUT LONG-TERM CURRENT USE OF INSULIN (HCC): ICD-10-CM

## 2023-06-19 DIAGNOSIS — R39.14 BENIGN PROSTATIC HYPERPLASIA WITH INCOMPLETE BLADDER EMPTYING: ICD-10-CM

## 2023-06-19 DIAGNOSIS — N40.1 BENIGN PROSTATIC HYPERPLASIA WITH INCOMPLETE BLADDER EMPTYING: ICD-10-CM

## 2023-06-19 DIAGNOSIS — J44.9 CHRONIC OBSTRUCTIVE PULMONARY DISEASE, UNSPECIFIED COPD TYPE (HCC): Primary | ICD-10-CM

## 2023-06-19 PROCEDURE — 3023F SPIROM DOC REV: CPT | Performed by: NURSE PRACTITIONER

## 2023-06-19 PROCEDURE — 3078F DIAST BP <80 MM HG: CPT | Performed by: NURSE PRACTITIONER

## 2023-06-19 PROCEDURE — G8417 CALC BMI ABV UP PARAM F/U: HCPCS | Performed by: NURSE PRACTITIONER

## 2023-06-19 PROCEDURE — G8427 DOCREV CUR MEDS BY ELIG CLIN: HCPCS | Performed by: NURSE PRACTITIONER

## 2023-06-19 PROCEDURE — 4004F PT TOBACCO SCREEN RCVD TLK: CPT | Performed by: NURSE PRACTITIONER

## 2023-06-19 PROCEDURE — 99214 OFFICE O/P EST MOD 30 MIN: CPT | Performed by: NURSE PRACTITIONER

## 2023-06-19 PROCEDURE — 3074F SYST BP LT 130 MM HG: CPT | Performed by: NURSE PRACTITIONER

## 2023-06-19 PROCEDURE — 1123F ACP DISCUSS/DSCN MKR DOCD: CPT | Performed by: NURSE PRACTITIONER

## 2023-06-19 RX ORDER — MAGNESIUM OXIDE 400 MG/1
400 TABLET ORAL DAILY
Qty: 90 TABLET | Refills: 1 | Status: SHIPPED | OUTPATIENT
Start: 2023-06-19

## 2023-06-19 RX ORDER — DESONIDE 0.5 MG/G
OINTMENT TOPICAL 2 TIMES DAILY PRN
COMMUNITY
End: 2023-06-19 | Stop reason: SDUPTHER

## 2023-06-19 RX ORDER — DESONIDE 0.5 MG/G
OINTMENT TOPICAL 2 TIMES DAILY PRN
Qty: 60 G | Refills: 5 | Status: SHIPPED | OUTPATIENT
Start: 2023-06-19

## 2023-06-19 RX ORDER — GLIPIZIDE 5 MG/1
10 TABLET ORAL
Qty: 360 TABLET | Refills: 1 | Status: SHIPPED | OUTPATIENT
Start: 2023-06-19 | End: 2023-09-17

## 2023-06-19 RX ORDER — TAMSULOSIN HYDROCHLORIDE 0.4 MG/1
0.4 CAPSULE ORAL DAILY
Qty: 90 CAPSULE | Refills: 1 | Status: SHIPPED | OUTPATIENT
Start: 2023-06-19 | End: 2023-09-17

## 2023-06-19 RX ORDER — GABAPENTIN 300 MG/1
300 CAPSULE ORAL 2 TIMES DAILY
Qty: 60 CAPSULE | Refills: 5 | Status: SHIPPED | OUTPATIENT
Start: 2023-06-19 | End: 2023-12-16

## 2023-06-19 RX ORDER — ROSUVASTATIN CALCIUM 20 MG/1
20 TABLET, COATED ORAL NIGHTLY
Qty: 90 TABLET | Refills: 1 | Status: SHIPPED | OUTPATIENT
Start: 2023-06-19 | End: 2023-12-16

## 2023-06-19 RX ORDER — CELECOXIB 200 MG/1
200 CAPSULE ORAL 2 TIMES DAILY
Qty: 180 CAPSULE | Refills: 1 | Status: SHIPPED | OUTPATIENT
Start: 2023-06-19 | End: 2023-09-17

## 2023-06-19 ASSESSMENT — ENCOUNTER SYMPTOMS
COLOR CHANGE: 0
EYE REDNESS: 0
VOMITING: 0
COUGH: 1
WHEEZING: 0
ABDOMINAL PAIN: 0
VOICE CHANGE: 0
ABDOMINAL DISTENTION: 0
EYE DISCHARGE: 0
SINUS PRESSURE: 0
EYE PAIN: 0
DIARRHEA: 0
NAUSEA: 0
BACK PAIN: 1
PHOTOPHOBIA: 0
TROUBLE SWALLOWING: 0
EYE ITCHING: 0
SHORTNESS OF BREATH: 1
SINUS PAIN: 0
CHOKING: 0
CONSTIPATION: 1
SORE THROAT: 0
CHEST TIGHTNESS: 0
FACIAL SWELLING: 0
RHINORRHEA: 1
BLOOD IN STOOL: 0

## 2023-06-19 NOTE — PROGRESS NOTES
Tendon Reflexes: Reflexes normal.   Psychiatric:         Attention and Perception: Attention and perception normal.         Mood and Affect: Mood and affect normal.         Speech: Speech normal.         Behavior: Behavior normal. Behavior is cooperative. Thought Content: Thought content normal.         Cognition and Memory: Cognition and memory normal.         Judgment: Judgment normal.     Assessment and Plan:  Matthew Pelletier was seen today for 6 month follow-up. Diagnoses and all orders for this visit:    SOB (shortness of breath) on exertion  Comments:  Acutely noted, uncontrolled  Orders:  -     XR CHEST (2 VW); Future    Chronic obstructive pulmonary disease, unspecified COPD type ([facility]  Comments:  Chronic, will assess with chest x-ray    Primary osteoarthritis involving multiple joints  Comments:  Chronic, uncontrolled  Orders:  -     celecoxib (CELEBREX) 200 MG capsule; Take 1 capsule by mouth 2 times daily    Diabetic polyneuropathy associated with type 2 diabetes mellitus ([facility]  Comments:  Chronic, stable  Orders:  -     gabapentin (NEURONTIN) 300 MG capsule; Take 1 capsule by mouth 2 times daily for 180 days. Type 2 diabetes mellitus with diabetic polyneuropathy, without long-term current use of insulin ([facility]  Comments:  Chronic, will assess with lab work  Orders:  -     glipiZIDE (GLUCOTROL) 5 MG tablet; Take 2 tablets by mouth 2 times daily (before meals)    Hypomagnesemia  Comments:  Chronic, stable  Orders:  -     magnesium oxide (MAG-OX) 400 MG tablet; Take 1 tablet by mouth daily    Primary hypertension  Comments:  Chronic, stable  Orders:  -     metoprolol tartrate (LOPRESSOR) 25 MG tablet; Take 1 tablet by mouth 2 times daily    Other hyperlipidemia  Comments:  Chronic, will assess with lab work  Orders:  -     rosuvastatin (CRESTOR) 20 MG tablet;  Take 1 tablet by mouth nightly    Benign prostatic hyperplasia with incomplete bladder emptying  Comments:  Chronic, controlled

## 2023-07-21 DIAGNOSIS — E11.42 DIABETIC POLYNEUROPATHY ASSOCIATED WITH TYPE 2 DIABETES MELLITUS (HCC): ICD-10-CM

## 2023-07-21 DIAGNOSIS — M15.9 PRIMARY OSTEOARTHRITIS INVOLVING MULTIPLE JOINTS: ICD-10-CM

## 2023-07-21 RX ORDER — CELECOXIB 200 MG/1
200 CAPSULE ORAL 2 TIMES DAILY
Qty: 180 CAPSULE | Refills: 0 | Status: SHIPPED | OUTPATIENT
Start: 2023-07-21 | End: 2023-10-19

## 2023-07-21 RX ORDER — ASPIRIN 81 MG/1
81 TABLET ORAL DAILY
Qty: 30 TABLET | Refills: 3 | Status: SHIPPED | OUTPATIENT
Start: 2023-07-21

## 2023-07-21 RX ORDER — GABAPENTIN 300 MG/1
300 CAPSULE ORAL 2 TIMES DAILY
Qty: 60 CAPSULE | Refills: 5 | Status: SHIPPED | OUTPATIENT
Start: 2023-07-21 | End: 2024-01-17

## 2023-07-21 NOTE — TELEPHONE ENCOUNTER
Patient requesting refills. Requesting be sent to [facility]. Last OV 6/19/2023  No scheduled f/u at this time.

## 2023-09-14 DIAGNOSIS — E83.42 HYPOMAGNESEMIA: ICD-10-CM

## 2023-09-14 RX ORDER — MAGNESIUM OXIDE 400 MG/1
400 TABLET ORAL DAILY
Qty: 90 TABLET | Refills: 1 | Status: SHIPPED | OUTPATIENT
Start: 2023-09-14

## 2023-09-14 NOTE — TELEPHONE ENCOUNTER
Patient came saying he wants magnesium refills to be sent to [facility], so I am pending it for you. Thanks.

## 2023-11-03 DIAGNOSIS — E83.42 HYPOMAGNESEMIA: ICD-10-CM

## 2023-11-03 DIAGNOSIS — M15.9 PRIMARY OSTEOARTHRITIS INVOLVING MULTIPLE JOINTS: ICD-10-CM

## 2023-11-03 RX ORDER — CELECOXIB 200 MG/1
200 CAPSULE ORAL 2 TIMES DAILY
Qty: 180 CAPSULE | Refills: 0 | Status: SHIPPED | OUTPATIENT
Start: 2023-11-03 | End: 2024-02-01

## 2023-11-03 RX ORDER — MAGNESIUM OXIDE 400 MG/1
400 TABLET ORAL DAILY
Qty: 90 TABLET | Refills: 1 | Status: SHIPPED | OUTPATIENT
Start: 2023-11-03

## 2023-11-03 NOTE — TELEPHONE ENCOUNTER
Patients last appointment 6/19/2023. Patients next scheduled appointment No future appointments. Patient walk in for refill request and one was hydrochlorothiazide but could not tell what the dose was. I looked in his chart and have no record of him being on this . I tried to call him back to get more info but didn't answer left Vm to call back. He also states the rosuvastatin was going to be $211. Asked patient if the pharmacy ran it through insurance and he states he wouldn't let them so advised patient that's probably why it is so much and informed him to let the pharmacy run it through insurance and to let us know how that goes.

## 2023-11-16 DIAGNOSIS — E83.42 HYPOMAGNESEMIA: ICD-10-CM

## 2023-11-16 DIAGNOSIS — M15.9 PRIMARY OSTEOARTHRITIS INVOLVING MULTIPLE JOINTS: ICD-10-CM

## 2023-11-16 RX ORDER — MAGNESIUM OXIDE 400 MG/1
400 TABLET ORAL DAILY
Qty: 90 TABLET | Refills: 0 | Status: SHIPPED | OUTPATIENT
Start: 2023-11-16

## 2023-11-16 RX ORDER — CELECOXIB 200 MG/1
200 CAPSULE ORAL 2 TIMES DAILY
Qty: 180 CAPSULE | Refills: 0 | Status: SHIPPED | OUTPATIENT
Start: 2023-11-16 | End: 2024-02-14

## 2023-11-16 NOTE — TELEPHONE ENCOUNTER
----- Message from [facility] sent at 11/16/2023  2:03 PM EST -----  Subject: Refill Request    QUESTIONS  Name of Medication? magnesium oxide (MAG-OX) 400 MG tablet  Patient-reported dosage and instructions? Pt is unsure of the dosage and   instructions  How many days do you have left? 0  Preferred Pharmacy? [address] [place] [id number]  Pharmacy phone number (if available)? [phone]  ---------------------------------------------------------------------------  --------------,  Name of Medication? celecoxib (CELEBREX) 200 MG capsule  Patient-reported dosage and instructions? 300 mg twice a day  How many days do you have left? 0  Preferred Pharmacy? [address] [place] #[id number]  Pharmacy phone number (if available)? [phone]  Additional Information for Provider? Pt is requesting a 90 day supply  ---------------------------------------------------------------------------  --------------  CALL BACK INFO  What is the best way for the office to contact you? OK to leave message on   voicemail  Preferred Call Back Phone Number? [phone]  ---------------------------------------------------------------------------  --------------  SCRIPT ANSWERS  Relationship to Patient?  Self

## 2023-11-16 NOTE — TELEPHONE ENCOUNTER
----- Message from [facility] sent at 11/16/2023  2:03 PM EST -----  Subject: Refill Request    QUESTIONS  Name of Medication? magnesium oxide (MAG-OX) 400 MG tablet  Patient-reported dosage and instructions? Pt is unsure of the dosage and   instructions  How many days do you have left? 0  [id number]  Pharmacy phone number (if available)? [phone]  ---------------------------------------------------------------------------  --------------,  Name of Medication? celecoxib (CELEBREX) 200 MG capsule  Patient-reported dosage and instructions? 300 mg twice a day  How many days do you have left? 0  [id number]  Pharmacy phone number (if available)? [phone]  Additional Information for Provider? Pt is requesting a 90 day supply  ---------------------------------------------------------------------------  --------------  CALL BACK INFO  What is the best way for the office to contact you? OK to leave message on   voicemail  Preferred Call Back Phone Number? [phone]  ---------------------------------------------------------------------------  --------------  SCRIPT ANSWERS  Relationship to Patient?  Self

## 2024-01-09 ENCOUNTER — OFFICE VISIT (OUTPATIENT)
Dept: PRIMARY CARE CLINIC | Age: 84
End: 2024-01-09
Payer: MEDICARE

## 2024-01-09 VITALS
TEMPERATURE: 97.9 F | BODY MASS INDEX: 30.23 KG/M2 | WEIGHT: 193 LBS | DIASTOLIC BLOOD PRESSURE: 70 MMHG | SYSTOLIC BLOOD PRESSURE: 110 MMHG | HEART RATE: 94 BPM | OXYGEN SATURATION: 96 %

## 2024-01-09 VITALS
SYSTOLIC BLOOD PRESSURE: 110 MMHG | TEMPERATURE: 97.9 F | HEIGHT: 67 IN | BODY MASS INDEX: 30.29 KG/M2 | DIASTOLIC BLOOD PRESSURE: 70 MMHG | WEIGHT: 193 LBS | HEART RATE: 94 BPM | OXYGEN SATURATION: 96 %

## 2024-01-09 DIAGNOSIS — E11.42 TYPE 2 DIABETES MELLITUS WITH DIABETIC POLYNEUROPATHY, WITHOUT LONG-TERM CURRENT USE OF INSULIN (HCC): ICD-10-CM

## 2024-01-09 DIAGNOSIS — N40.1 BENIGN PROSTATIC HYPERPLASIA WITH INCOMPLETE BLADDER EMPTYING: ICD-10-CM

## 2024-01-09 DIAGNOSIS — Z00.00 INITIAL MEDICARE ANNUAL WELLNESS VISIT: Primary | ICD-10-CM

## 2024-01-09 DIAGNOSIS — I10 PRIMARY HYPERTENSION: ICD-10-CM

## 2024-01-09 DIAGNOSIS — E78.49 OTHER HYPERLIPIDEMIA: ICD-10-CM

## 2024-01-09 DIAGNOSIS — R39.14 BENIGN PROSTATIC HYPERPLASIA WITH INCOMPLETE BLADDER EMPTYING: ICD-10-CM

## 2024-01-09 DIAGNOSIS — J44.9 CHRONIC OBSTRUCTIVE PULMONARY DISEASE, UNSPECIFIED COPD TYPE (HCC): Primary | ICD-10-CM

## 2024-01-09 DIAGNOSIS — J44.9 CHRONIC OBSTRUCTIVE PULMONARY DISEASE, UNSPECIFIED COPD TYPE (HCC): ICD-10-CM

## 2024-01-09 DIAGNOSIS — E83.42 HYPOMAGNESEMIA: ICD-10-CM

## 2024-01-09 DIAGNOSIS — I25.118 CORONARY ARTERY DISEASE OF NATIVE HEART WITH STABLE ANGINA PECTORIS, UNSPECIFIED VESSEL OR LESION TYPE (HCC): ICD-10-CM

## 2024-01-09 DIAGNOSIS — F33.1 MAJOR DEPRESSIVE DISORDER, RECURRENT, MODERATE (HCC): ICD-10-CM

## 2024-01-09 DIAGNOSIS — M15.9 PRIMARY OSTEOARTHRITIS INVOLVING MULTIPLE JOINTS: ICD-10-CM

## 2024-01-09 DIAGNOSIS — Z12.5 SCREENING FOR PROSTATE CANCER: ICD-10-CM

## 2024-01-09 PROBLEM — F33.0 MAJOR DEPRESSIVE DISORDER, RECURRENT, MILD (HCC): Status: RESOLVED | Noted: 2023-04-10 | Resolved: 2024-01-09

## 2024-01-09 PROBLEM — F33.9 MAJOR DEPRESSIVE DISORDER, RECURRENT, UNSPECIFIED (HCC): Status: RESOLVED | Noted: 2023-04-10 | Resolved: 2024-01-09

## 2024-01-09 LAB
ABSOLUTE IMMATURE GRANULOCYTE: 0.03 K/UL (ref 0–0.58)
BASOPHILS ABSOLUTE: 0.04 K/UL (ref 0–0.2)
BASOPHILS RELATIVE PERCENT: 1 % (ref 0–2)
EOSINOPHILS ABSOLUTE: 0.16 K/UL (ref 0.05–0.5)
EOSINOPHILS RELATIVE PERCENT: 2 % (ref 0–6)
HCT VFR BLD CALC: 43.3 % (ref 37–54)
HEMOGLOBIN: 13.6 G/DL (ref 12.5–16.5)
IMMATURE GRANULOCYTES: 0 % (ref 0–5)
LYMPHOCYTES ABSOLUTE: 2.01 K/UL (ref 1.5–4)
LYMPHOCYTES RELATIVE PERCENT: 26 % (ref 20–42)
MCH RBC QN AUTO: 28.5 PG (ref 26–35)
MCHC RBC AUTO-ENTMCNC: 31.4 G/DL (ref 32–34.5)
MCV RBC AUTO: 90.6 FL (ref 80–99.9)
MONOCYTES ABSOLUTE: 0.56 K/UL (ref 0.1–0.95)
MONOCYTES RELATIVE PERCENT: 7 % (ref 2–12)
NEUTROPHILS ABSOLUTE: 4.98 K/UL (ref 1.8–7.3)
NEUTROPHILS RELATIVE PERCENT: 64 % (ref 43–80)
PDW BLD-RTO: 13.2 % (ref 11.5–15)
PLATELET # BLD: 214 K/UL (ref 130–450)
PMV BLD AUTO: 9.6 FL (ref 7–12)
RBC # BLD: 4.78 M/UL (ref 3.8–5.8)
WBC # BLD: 7.8 K/UL (ref 4.5–11.5)

## 2024-01-09 PROCEDURE — 3074F SYST BP LT 130 MM HG: CPT | Performed by: NURSE PRACTITIONER

## 2024-01-09 PROCEDURE — 99214 OFFICE O/P EST MOD 30 MIN: CPT | Performed by: NURSE PRACTITIONER

## 2024-01-09 PROCEDURE — 3078F DIAST BP <80 MM HG: CPT | Performed by: NURSE PRACTITIONER

## 2024-01-09 PROCEDURE — 1123F ACP DISCUSS/DSCN MKR DOCD: CPT | Performed by: NURSE PRACTITIONER

## 2024-01-09 PROCEDURE — 4004F PT TOBACCO SCREEN RCVD TLK: CPT | Performed by: NURSE PRACTITIONER

## 2024-01-09 PROCEDURE — G8417 CALC BMI ABV UP PARAM F/U: HCPCS | Performed by: NURSE PRACTITIONER

## 2024-01-09 PROCEDURE — 36415 COLL VENOUS BLD VENIPUNCTURE: CPT | Performed by: NURSE PRACTITIONER

## 2024-01-09 PROCEDURE — G8484 FLU IMMUNIZE NO ADMIN: HCPCS | Performed by: NURSE PRACTITIONER

## 2024-01-09 PROCEDURE — G8427 DOCREV CUR MEDS BY ELIG CLIN: HCPCS | Performed by: NURSE PRACTITIONER

## 2024-01-09 PROCEDURE — 3023F SPIROM DOC REV: CPT | Performed by: NURSE PRACTITIONER

## 2024-01-09 PROCEDURE — G0438 PPPS, INITIAL VISIT: HCPCS | Performed by: NURSE PRACTITIONER

## 2024-01-09 RX ORDER — ROSUVASTATIN CALCIUM 20 MG/1
20 TABLET, COATED ORAL NIGHTLY
Qty: 90 TABLET | Refills: 1 | Status: SHIPPED | OUTPATIENT
Start: 2024-01-09 | End: 2024-07-07

## 2024-01-09 RX ORDER — MAGNESIUM OXIDE 400 MG/1
400 TABLET ORAL DAILY
Qty: 90 TABLET | Refills: 1 | Status: SHIPPED | OUTPATIENT
Start: 2024-01-09

## 2024-01-09 RX ORDER — TAMSULOSIN HYDROCHLORIDE 0.4 MG/1
0.4 CAPSULE ORAL DAILY
Qty: 90 CAPSULE | Refills: 1 | Status: SHIPPED | OUTPATIENT
Start: 2024-01-09 | End: 2024-07-07

## 2024-01-09 RX ORDER — CELECOXIB 100 MG/1
100 CAPSULE ORAL DAILY
Qty: 90 CAPSULE | Refills: 1 | Status: SHIPPED | OUTPATIENT
Start: 2024-01-09

## 2024-01-09 RX ORDER — GLIPIZIDE 5 MG/1
10 TABLET ORAL
Qty: 360 TABLET | Refills: 1 | Status: SHIPPED | OUTPATIENT
Start: 2024-01-09 | End: 2024-07-07

## 2024-01-09 RX ORDER — GABAPENTIN 300 MG/1
300 CAPSULE ORAL 2 TIMES DAILY
Qty: 60 CAPSULE | Refills: 5 | Status: SHIPPED | OUTPATIENT
Start: 2024-01-09 | End: 2024-07-07

## 2024-01-09 RX ORDER — ASPIRIN 81 MG/1
81 TABLET ORAL DAILY
Qty: 90 TABLET | Refills: 1 | Status: SHIPPED | OUTPATIENT
Start: 2024-01-09

## 2024-01-09 ASSESSMENT — PATIENT HEALTH QUESTIONNAIRE - PHQ9
SUM OF ALL RESPONSES TO PHQ9 QUESTIONS 1 & 2: 2
2. FEELING DOWN, DEPRESSED OR HOPELESS: 1
10. IF YOU CHECKED OFF ANY PROBLEMS, HOW DIFFICULT HAVE THESE PROBLEMS MADE IT FOR YOU TO DO YOUR WORK, TAKE CARE OF THINGS AT HOME, OR GET ALONG WITH OTHER PEOPLE: 1
6. FEELING BAD ABOUT YOURSELF - OR THAT YOU ARE A FAILURE OR HAVE LET YOURSELF OR YOUR FAMILY DOWN: 3
3. TROUBLE FALLING OR STAYING ASLEEP: 3
9. THOUGHTS THAT YOU WOULD BE BETTER OFF DEAD, OR OF HURTING YOURSELF: 0
SUM OF ALL RESPONSES TO PHQ QUESTIONS 1-9: 15
4. FEELING TIRED OR HAVING LITTLE ENERGY: 3
SUM OF ALL RESPONSES TO PHQ QUESTIONS 1-9: 15
7. TROUBLE CONCENTRATING ON THINGS, SUCH AS READING THE NEWSPAPER OR WATCHING TELEVISION: 0
SUM OF ALL RESPONSES TO PHQ QUESTIONS 1-9: 15
1. LITTLE INTEREST OR PLEASURE IN DOING THINGS: 1
8. MOVING OR SPEAKING SO SLOWLY THAT OTHER PEOPLE COULD HAVE NOTICED. OR THE OPPOSITE, BEING SO FIGETY OR RESTLESS THAT YOU HAVE BEEN MOVING AROUND A LOT MORE THAN USUAL: 1
5. POOR APPETITE OR OVEREATING: 3
SUM OF ALL RESPONSES TO PHQ QUESTIONS 1-9: 15

## 2024-01-09 ASSESSMENT — ENCOUNTER SYMPTOMS
EYE REDNESS: 0
SHORTNESS OF BREATH: 1
EYE ITCHING: 0
DIARRHEA: 0
WHEEZING: 0
SINUS PAIN: 0
BLOOD IN STOOL: 0
COUGH: 0
BACK PAIN: 1
CHEST TIGHTNESS: 0
SINUS PRESSURE: 0
CHOKING: 0
ABDOMINAL PAIN: 0
PHOTOPHOBIA: 0
EYE PAIN: 0
ABDOMINAL DISTENTION: 0
VOICE CHANGE: 0
SORE THROAT: 0
NAUSEA: 0
TROUBLE SWALLOWING: 0
CONSTIPATION: 1
VOMITING: 0
FACIAL SWELLING: 0
EYE DISCHARGE: 0
COLOR CHANGE: 0
RHINORRHEA: 0

## 2024-01-09 ASSESSMENT — LIFESTYLE VARIABLES
HOW MANY STANDARD DRINKS CONTAINING ALCOHOL DO YOU HAVE ON A TYPICAL DAY: PATIENT DOES NOT DRINK
HOW OFTEN DO YOU HAVE A DRINK CONTAINING ALCOHOL: NEVER

## 2024-01-09 NOTE — PROGRESS NOTES
of breath, with exertion.  He states that is progressively worsened and has not suddenly changed.     Activity, Diet, and Weight:  On average, how many days per week do you engage in moderate to strenuous exercise (like a brisk walk)?: 0 days  On average, how many minutes do you engage in exercise at this level?: 0 min    Do you eat balanced/healthy meals regularly?: (!) No    Body mass index is 30.23 kg/m². (!) Abnormal      Inactivity Interventions:  Patient comments: He is limited due to to his breathing and immobility.  Do you eat balanced/healthy meals regularly Interventions:  Patient comments: He reports that he does not have anyone to cook meals for, so it is easier for him to eat what food he has available.  Obesity Interventions:  exercise for at least 150 minutes/week.  I did educate him on the ability to do exercises, while sitting in the chair, such as leg and arm raises.             Hearing Screen:  Do you or your family notice any trouble with your hearing that hasn't been managed with hearing aids?: (!) Yes    Interventions:  Patient declines any further evaluation or treatment    Vision Screen:  Do you have difficulty driving, watching TV, or doing any of your daily activities because of your eyesight?: No  Have you had an eye exam within the past year?: (!) No  No results found.    Interventions:   Patient encouraged to make appointment with their eye specialist    Safety:  Do you have any tripping hazards - loose or unsecured carpets or rugs?: (!) Yes  Interventions:  Patient comments: He reports that he does not have rugs in his house..  He reports that sometimes his dog walks out of him and causes him to stumble.    ADL's:   Patient reports needing help with:  Select all that apply: (!) Dressing  Interventions:  Patient comments: He reports that it takes longer for him to get dressed, than what it had been.     Tobacco Use:  Tobacco Use: High Risk (1/9/2024)    Patient History     Smoking

## 2024-01-09 NOTE — PROGRESS NOTES
24  Jimenez Saravia : 1940 Sex: male  Age: 83 y.o.    Chief Complaint   Patient presents with    6 Month Follow-Up    Constipation    Insomnia     Trouble falling asleep at night       Shortness of Breath     More so lately     Arthritis     Worse in right knee        Jimenez is seen today for a 6-month follow-up and a review of his overall condition and plan of care.  He reports that his biggest issue today is that he has been having progressively worsening shortness of breath.  He states that he notices that when he tries to exert himself, he becomes more short of breath.  He reports he is a former smoker and does have a history of COPD.  He also reports he has been having some issues with constipation.  He states he has been taking over-the-counter medication, which has helped.  He is still ambulating using a cane for ambulatory assistance.  He states that he has had no falls, but recently stumbled and hit his left forearm on the edge of the kitchen sink.  He does have a wound, that is not currently bleeding.  Lastly, he continues report chronic issues with sleeping.  He has been on trazodone before and over-the-counter medications, but has had no relief in his symptoms.  He reports that he would like to continue with his over-the-counter medicines, at this time.  He is fasting today, so we will complete his labs, while he is here.  He denies any acute confusion, forgetfulness or any change in cognition.        Review of Systems   Constitutional:  Positive for fatigue. Negative for appetite change, chills, diaphoresis, fever and unexpected weight change.   HENT:  Negative for congestion, ear pain, facial swelling, hearing loss, nosebleeds, postnasal drip, rhinorrhea, sinus pressure, sinus pain, sneezing, sore throat, tinnitus, trouble swallowing and voice change.    Eyes:  Negative for photophobia, pain, discharge, redness and itching.   Respiratory:  Positive for shortness of breath

## 2024-01-10 LAB
ALBUMIN SERPL-MCNC: 4.2 G/DL (ref 3.5–5.2)
ALP BLD-CCNC: 100 U/L (ref 40–129)
ALT SERPL-CCNC: 14 U/L (ref 0–40)
ANION GAP SERPL CALCULATED.3IONS-SCNC: 20 MMOL/L (ref 7–16)
AST SERPL-CCNC: 28 U/L (ref 0–39)
BILIRUB SERPL-MCNC: 0.4 MG/DL (ref 0–1.2)
BUN BLDV-MCNC: 26 MG/DL (ref 6–23)
CALCIUM SERPL-MCNC: 9.8 MG/DL (ref 8.6–10.2)
CHLORIDE BLD-SCNC: 102 MMOL/L (ref 98–107)
CHOLESTEROL: 154 MG/DL
CO2: 19 MMOL/L (ref 22–29)
CREAT SERPL-MCNC: 1.5 MG/DL (ref 0.7–1.2)
GFR SERPL CREATININE-BSD FRML MDRD: 48 ML/MIN/1.73M2
GLUCOSE BLD-MCNC: 132 MG/DL (ref 74–99)
HBA1C MFR BLD: 6.3 % (ref 4–5.6)
HDLC SERPL-MCNC: 55 MG/DL
LDL CHOLESTEROL: 76 MG/DL
POTASSIUM SERPL-SCNC: 5 MMOL/L (ref 3.5–5)
PROSTATE SPECIFIC ANTIGEN: 0.75 NG/ML (ref 0–4)
SODIUM BLD-SCNC: 141 MMOL/L (ref 132–146)
TOTAL PROTEIN: 7.9 G/DL (ref 6.4–8.3)
TRIGL SERPL-MCNC: 113 MG/DL
TSH SERPL DL<=0.05 MIU/L-ACNC: 3.25 UIU/ML (ref 0.27–4.2)
VLDLC SERPL CALC-MCNC: 23 MG/DL

## 2024-04-15 ENCOUNTER — TELEPHONE (OUTPATIENT)
Dept: PRIMARY CARE CLINIC | Age: 84
End: 2024-04-15

## 2024-04-15 NOTE — TELEPHONE ENCOUNTER
Jimenez called having trouble breathing like Sob when he takes a shower or goes places and walking ,seems to get worse everyday asking for an appointment ,looks like you are booked out a few weeks staff states, so I offered him walk in he will come to marrow.

## 2024-07-08 DIAGNOSIS — E11.42 TYPE 2 DIABETES MELLITUS WITH DIABETIC POLYNEUROPATHY, WITHOUT LONG-TERM CURRENT USE OF INSULIN (HCC): ICD-10-CM

## 2024-07-08 DIAGNOSIS — M15.9 PRIMARY OSTEOARTHRITIS INVOLVING MULTIPLE JOINTS: ICD-10-CM

## 2024-07-08 DIAGNOSIS — R39.14 BENIGN PROSTATIC HYPERPLASIA WITH INCOMPLETE BLADDER EMPTYING: ICD-10-CM

## 2024-07-08 DIAGNOSIS — N40.1 BENIGN PROSTATIC HYPERPLASIA WITH INCOMPLETE BLADDER EMPTYING: ICD-10-CM

## 2024-07-08 DIAGNOSIS — E78.49 OTHER HYPERLIPIDEMIA: ICD-10-CM

## 2024-07-08 DIAGNOSIS — I10 PRIMARY HYPERTENSION: ICD-10-CM

## 2024-07-08 RX ORDER — GLIPIZIDE 5 MG/1
TABLET ORAL
Qty: 360 TABLET | Refills: 1 | Status: SHIPPED | OUTPATIENT
Start: 2024-07-08

## 2024-07-08 RX ORDER — TAMSULOSIN HYDROCHLORIDE 0.4 MG/1
0.4 CAPSULE ORAL DAILY
Qty: 90 CAPSULE | Refills: 1 | Status: SHIPPED | OUTPATIENT
Start: 2024-07-08 | End: 2025-01-04

## 2024-07-08 RX ORDER — CELECOXIB 100 MG/1
100 CAPSULE ORAL DAILY
Qty: 90 CAPSULE | Refills: 1 | Status: SHIPPED | OUTPATIENT
Start: 2024-07-08

## 2024-07-08 RX ORDER — ROSUVASTATIN CALCIUM 20 MG/1
20 TABLET, COATED ORAL NIGHTLY
Qty: 90 TABLET | Refills: 1 | Status: SHIPPED | OUTPATIENT
Start: 2024-07-08

## 2024-07-08 NOTE — TELEPHONE ENCOUNTER
Patients last appointment 1/9/2024.  Patients next scheduled appointment   Future Appointments   Date Time Provider Department Center   7/9/2024 12:00 PM Jose Antonio Mendez APRN - [facility]

## 2024-07-09 ENCOUNTER — OFFICE VISIT (OUTPATIENT)
Dept: PRIMARY CARE CLINIC | Age: 84
End: 2024-07-09
Payer: MEDICARE

## 2024-07-09 VITALS
OXYGEN SATURATION: 97 % | DIASTOLIC BLOOD PRESSURE: 74 MMHG | TEMPERATURE: 97.7 F | BODY MASS INDEX: 29.23 KG/M2 | SYSTOLIC BLOOD PRESSURE: 110 MMHG | WEIGHT: 186.6 LBS | HEART RATE: 115 BPM

## 2024-07-09 DIAGNOSIS — M15.9 PRIMARY OSTEOARTHRITIS INVOLVING MULTIPLE JOINTS: ICD-10-CM

## 2024-07-09 DIAGNOSIS — N40.1 BENIGN PROSTATIC HYPERPLASIA WITH INCOMPLETE BLADDER EMPTYING: ICD-10-CM

## 2024-07-09 DIAGNOSIS — E11.42 TYPE 2 DIABETES MELLITUS WITH DIABETIC POLYNEUROPATHY, WITHOUT LONG-TERM CURRENT USE OF INSULIN (HCC): Primary | ICD-10-CM

## 2024-07-09 DIAGNOSIS — E83.42 HYPOMAGNESEMIA: ICD-10-CM

## 2024-07-09 DIAGNOSIS — E55.9 VITAMIN D DEFICIENCY: ICD-10-CM

## 2024-07-09 DIAGNOSIS — J44.9 CHRONIC OBSTRUCTIVE PULMONARY DISEASE, UNSPECIFIED COPD TYPE (HCC): ICD-10-CM

## 2024-07-09 DIAGNOSIS — F33.1 MAJOR DEPRESSIVE DISORDER, RECURRENT, MODERATE (HCC): ICD-10-CM

## 2024-07-09 DIAGNOSIS — I25.118 CORONARY ARTERY DISEASE OF NATIVE HEART WITH STABLE ANGINA PECTORIS, UNSPECIFIED VESSEL OR LESION TYPE (HCC): ICD-10-CM

## 2024-07-09 DIAGNOSIS — I10 PRIMARY HYPERTENSION: ICD-10-CM

## 2024-07-09 DIAGNOSIS — R39.14 BENIGN PROSTATIC HYPERPLASIA WITH INCOMPLETE BLADDER EMPTYING: ICD-10-CM

## 2024-07-09 PROCEDURE — 3044F HG A1C LEVEL LT 7.0%: CPT | Performed by: NURSE PRACTITIONER

## 2024-07-09 PROCEDURE — 36415 COLL VENOUS BLD VENIPUNCTURE: CPT | Performed by: NURSE PRACTITIONER

## 2024-07-09 PROCEDURE — 3023F SPIROM DOC REV: CPT | Performed by: NURSE PRACTITIONER

## 2024-07-09 PROCEDURE — 1123F ACP DISCUSS/DSCN MKR DOCD: CPT | Performed by: NURSE PRACTITIONER

## 2024-07-09 PROCEDURE — 3074F SYST BP LT 130 MM HG: CPT | Performed by: NURSE PRACTITIONER

## 2024-07-09 PROCEDURE — 3078F DIAST BP <80 MM HG: CPT | Performed by: NURSE PRACTITIONER

## 2024-07-09 PROCEDURE — G8427 DOCREV CUR MEDS BY ELIG CLIN: HCPCS | Performed by: NURSE PRACTITIONER

## 2024-07-09 PROCEDURE — 99214 OFFICE O/P EST MOD 30 MIN: CPT | Performed by: NURSE PRACTITIONER

## 2024-07-09 PROCEDURE — G8417 CALC BMI ABV UP PARAM F/U: HCPCS | Performed by: NURSE PRACTITIONER

## 2024-07-09 PROCEDURE — 4004F PT TOBACCO SCREEN RCVD TLK: CPT | Performed by: NURSE PRACTITIONER

## 2024-07-09 RX ORDER — MAGNESIUM OXIDE 400 MG/1
400 TABLET ORAL DAILY
Qty: 90 TABLET | Refills: 1 | Status: SHIPPED | OUTPATIENT
Start: 2024-07-09

## 2024-07-09 SDOH — ECONOMIC STABILITY: FOOD INSECURITY: WITHIN THE PAST 12 MONTHS, THE FOOD YOU BOUGHT JUST DIDN'T LAST AND YOU DIDN'T HAVE MONEY TO GET MORE.: NEVER TRUE

## 2024-07-09 SDOH — ECONOMIC STABILITY: HOUSING INSECURITY
IN THE LAST 12 MONTHS, WAS THERE A TIME WHEN YOU DID NOT HAVE A STEADY PLACE TO SLEEP OR SLEPT IN A SHELTER (INCLUDING NOW)?: NO

## 2024-07-09 SDOH — ECONOMIC STABILITY: FOOD INSECURITY: WITHIN THE PAST 12 MONTHS, YOU WORRIED THAT YOUR FOOD WOULD RUN OUT BEFORE YOU GOT MONEY TO BUY MORE.: NEVER TRUE

## 2024-07-09 SDOH — ECONOMIC STABILITY: INCOME INSECURITY: HOW HARD IS IT FOR YOU TO PAY FOR THE VERY BASICS LIKE FOOD, HOUSING, MEDICAL CARE, AND HEATING?: NOT HARD AT ALL

## 2024-07-09 ASSESSMENT — ENCOUNTER SYMPTOMS
DIARRHEA: 0
VOMITING: 0
CONSTIPATION: 1
EYE DISCHARGE: 0
CHEST TIGHTNESS: 0
SHORTNESS OF BREATH: 1
CHOKING: 0
BLOOD IN STOOL: 0
SINUS PAIN: 0
EYE ITCHING: 0
VOICE CHANGE: 0
COUGH: 0
TROUBLE SWALLOWING: 0
EYE PAIN: 0
SINUS PRESSURE: 0
BACK PAIN: 1
FACIAL SWELLING: 0
WHEEZING: 0
SORE THROAT: 0
PHOTOPHOBIA: 0
ABDOMINAL PAIN: 0
COLOR CHANGE: 0
EYE REDNESS: 0
ABDOMINAL DISTENTION: 0
NAUSEA: 0
RHINORRHEA: 0

## 2024-07-09 NOTE — PROGRESS NOTES
24  Jimenez Saravia : 1940 Sex: male  Age: 83 y.o.    Chief Complaint   Patient presents with    6 Month Follow-Up    Other     Stays up for 20-24 hours then sleeps for 12-15 hours        Jimenez is seen today for a 6-month follow-up and a review of his overall condition and plan of care.  He reports his biggest issue today is that he has issues with insomnia.  He states that there are times where he is awake for 24 to 36 hours at a time.  He is currently managing it with melatonin and a shot of NyQuil.  I did offer to prescribe trazodone, but he states that he has had that before and it did not work for him.  He would like to just continue with what he has been doing.  Continues to report shortness of breath with exertion, but states it is no worse than normal.  His constipation is managed with pill that he takes every 3 days.  He states that if he takes it any sooner, it will cause him to have diarrhea.  Lastly, he continues report chronic pain to his back and right knee.  He was recently told by a friend to try blue emu cream, to help with this.  He was going to pick this up when he picked up his prescriptions.  He denies any further acute issues at this time.  He denies any acute confusion, forgetfulness or any change in cognition.        Review of Systems   Constitutional:  Negative for appetite change, chills, diaphoresis, fatigue, fever and unexpected weight change.   HENT:  Negative for congestion, ear pain, facial swelling, hearing loss, nosebleeds, postnasal drip, rhinorrhea, sinus pressure, sinus pain, sneezing, sore throat, tinnitus, trouble swallowing and voice change.    Eyes:  Negative for photophobia, pain, discharge, redness and itching.   Respiratory:  Positive for shortness of breath (When exerting himself). Negative for cough, choking, chest tightness and wheezing.    Cardiovascular:  Negative for chest pain, palpitations and leg swelling.   Gastrointestinal:  Positive for

## 2024-07-13 DIAGNOSIS — E11.42 TYPE 2 DIABETES MELLITUS WITH DIABETIC POLYNEUROPATHY, WITHOUT LONG-TERM CURRENT USE OF INSULIN (HCC): ICD-10-CM

## 2024-07-15 RX ORDER — GABAPENTIN 300 MG/1
300 CAPSULE ORAL 2 TIMES DAILY
Qty: 60 CAPSULE | Refills: 5 | Status: SHIPPED | OUTPATIENT
Start: 2024-07-15 | End: 2025-01-11

## 2024-07-15 NOTE — TELEPHONE ENCOUNTER
Patients last appointment 7/9/2024.  Patients next scheduled appointment   Future Appointments   Date Time Provider Department Center   1/2/2025  9:00 AM SCHEDULE, KIMBERLEY ROSAS [facility]   1/9/2025 11:00 AM Jose Antonio Mendez APRN - [facility]

## 2024-10-04 DIAGNOSIS — Z23 NEED FOR INFLUENZA VACCINATION: Primary | ICD-10-CM

## 2025-01-02 ENCOUNTER — LAB (OUTPATIENT)
Dept: PRIMARY CARE CLINIC | Age: 85
End: 2025-01-02
Payer: MEDICARE

## 2025-01-02 DIAGNOSIS — E78.49 OTHER HYPERLIPIDEMIA: ICD-10-CM

## 2025-01-02 DIAGNOSIS — J44.9 CHRONIC OBSTRUCTIVE PULMONARY DISEASE, UNSPECIFIED COPD TYPE (HCC): Primary | ICD-10-CM

## 2025-01-02 DIAGNOSIS — E11.65 TYPE 2 DIABETES MELLITUS WITH HYPERGLYCEMIA, WITHOUT LONG-TERM CURRENT USE OF INSULIN (HCC): ICD-10-CM

## 2025-01-02 DIAGNOSIS — I20.89 STABLE ANGINA PECTORIS (HCC): ICD-10-CM

## 2025-01-02 DIAGNOSIS — I21.4 NSTEMI, INITIAL EPISODE OF CARE (HCC): ICD-10-CM

## 2025-01-02 DIAGNOSIS — I25.118 CORONARY ARTERY DISEASE OF NATIVE HEART WITH STABLE ANGINA PECTORIS, UNSPECIFIED VESSEL OR LESION TYPE (HCC): ICD-10-CM

## 2025-01-02 DIAGNOSIS — I10 PRIMARY HYPERTENSION: ICD-10-CM

## 2025-01-02 LAB
BASOPHILS ABSOLUTE: 0.05 K/UL (ref 0–0.2)
BASOPHILS RELATIVE PERCENT: 1 % (ref 0–2)
EOSINOPHILS ABSOLUTE: 0.29 K/UL (ref 0.05–0.5)
EOSINOPHILS RELATIVE PERCENT: 3 % (ref 0–6)
HCT VFR BLD CALC: 43.6 % (ref 37–54)
HEMOGLOBIN: 14.1 G/DL (ref 12.5–16.5)
IMMATURE GRANULOCYTES %: 0 % (ref 0–5)
IMMATURE GRANULOCYTES ABSOLUTE: <0.03 K/UL (ref 0–0.58)
LYMPHOCYTES ABSOLUTE: 4.25 K/UL (ref 1.5–4)
LYMPHOCYTES RELATIVE PERCENT: 50 % (ref 20–42)
MCH RBC QN AUTO: 30.1 PG (ref 26–35)
MCHC RBC AUTO-ENTMCNC: 32.3 G/DL (ref 32–34.5)
MCV RBC AUTO: 93.2 FL (ref 80–99.9)
MONOCYTES ABSOLUTE: 0.7 K/UL (ref 0.1–0.95)
MONOCYTES RELATIVE PERCENT: 8 % (ref 2–12)
NEUTROPHILS ABSOLUTE: 3.13 K/UL (ref 1.8–7.3)
NEUTROPHILS RELATIVE PERCENT: 37 % (ref 43–80)
PDW BLD-RTO: 13.4 % (ref 11.5–15)
PLATELET CONFIRMATION: NORMAL
PLATELET, FLUORESCENCE: 199 K/UL (ref 130–450)
PMV BLD AUTO: 9.2 FL (ref 7–12)
RBC # BLD: 4.68 M/UL (ref 3.8–5.8)
WBC # BLD: 8.4 K/UL (ref 4.5–11.5)

## 2025-01-02 PROCEDURE — 36415 COLL VENOUS BLD VENIPUNCTURE: CPT | Performed by: NURSE PRACTITIONER

## 2025-01-03 LAB
ALBUMIN: 4.2 G/DL (ref 3.5–5.2)
ALP BLD-CCNC: 86 U/L (ref 40–129)
ALT SERPL-CCNC: 13 U/L (ref 0–40)
ANION GAP SERPL CALCULATED.3IONS-SCNC: 12 MMOL/L (ref 7–16)
AST SERPL-CCNC: 25 U/L (ref 0–39)
BILIRUB SERPL-MCNC: 0.3 MG/DL (ref 0–1.2)
BUN BLDV-MCNC: 15 MG/DL (ref 6–23)
CALCIUM SERPL-MCNC: 9.2 MG/DL (ref 8.6–10.2)
CHLORIDE BLD-SCNC: 106 MMOL/L (ref 98–107)
CHOLESTEROL, TOTAL: 166 MG/DL
CO2: 25 MMOL/L (ref 22–29)
CREAT SERPL-MCNC: 1.1 MG/DL (ref 0.7–1.2)
GFR, ESTIMATED: 69 ML/MIN/1.73M2
GLUCOSE BLD-MCNC: 159 MG/DL (ref 74–99)
HBA1C MFR BLD: 6.1 % (ref 4–5.6)
HDLC SERPL-MCNC: 53 MG/DL
LDL CHOLESTEROL: 86 MG/DL
MAGNESIUM: 2.3 MG/DL (ref 1.6–2.6)
POTASSIUM SERPL-SCNC: 4 MMOL/L (ref 3.5–5)
SODIUM BLD-SCNC: 143 MMOL/L (ref 132–146)
TOTAL PROTEIN: 7.3 G/DL (ref 6.4–8.3)
TRIGL SERPL-MCNC: 137 MG/DL
TSH SERPL DL<=0.05 MIU/L-ACNC: 4.77 UIU/ML (ref 0.27–4.2)
VITAMIN D 25-HYDROXY: 24.9 NG/ML (ref 30–100)
VLDLC SERPL CALC-MCNC: 27 MG/DL

## 2025-01-05 DIAGNOSIS — N40.1 BENIGN PROSTATIC HYPERPLASIA WITH INCOMPLETE BLADDER EMPTYING: ICD-10-CM

## 2025-01-05 DIAGNOSIS — M15.0 PRIMARY OSTEOARTHRITIS INVOLVING MULTIPLE JOINTS: ICD-10-CM

## 2025-01-05 DIAGNOSIS — E11.42 TYPE 2 DIABETES MELLITUS WITH DIABETIC POLYNEUROPATHY, WITHOUT LONG-TERM CURRENT USE OF INSULIN (HCC): ICD-10-CM

## 2025-01-05 DIAGNOSIS — E78.49 OTHER HYPERLIPIDEMIA: ICD-10-CM

## 2025-01-05 DIAGNOSIS — I10 PRIMARY HYPERTENSION: ICD-10-CM

## 2025-01-05 DIAGNOSIS — R39.14 BENIGN PROSTATIC HYPERPLASIA WITH INCOMPLETE BLADDER EMPTYING: ICD-10-CM

## 2025-01-06 RX ORDER — GLIPIZIDE 5 MG/1
TABLET ORAL
Qty: 360 TABLET | Refills: 1 | Status: SHIPPED | OUTPATIENT
Start: 2025-01-06

## 2025-01-06 RX ORDER — TAMSULOSIN HYDROCHLORIDE 0.4 MG/1
0.4 CAPSULE ORAL DAILY
Qty: 90 CAPSULE | Refills: 1 | Status: SHIPPED | OUTPATIENT
Start: 2025-01-06 | End: 2025-07-05

## 2025-01-06 RX ORDER — METOPROLOL TARTRATE 25 MG/1
25 TABLET, FILM COATED ORAL DAILY
Qty: 90 TABLET | Refills: 1 | Status: SHIPPED | OUTPATIENT
Start: 2025-01-06 | End: 2025-07-05

## 2025-01-06 RX ORDER — ROSUVASTATIN CALCIUM 20 MG/1
20 TABLET, COATED ORAL NIGHTLY
Qty: 90 TABLET | Refills: 1 | Status: SHIPPED | OUTPATIENT
Start: 2025-01-06

## 2025-01-06 RX ORDER — CELECOXIB 100 MG/1
100 CAPSULE ORAL DAILY
Qty: 90 CAPSULE | Refills: 1 | Status: SHIPPED | OUTPATIENT
Start: 2025-01-06

## 2025-01-06 NOTE — TELEPHONE ENCOUNTER
Name of Medication(s) Requested:  Requested Prescriptions     Pending Prescriptions Disp Refills    celecoxib (CELEBREX) 100 MG capsule [Pharmacy Med Name: CELECOXIB 100MG CAPSULES] 90 capsule 1     Sig: TAKE 1 CAPSULE BY MOUTH DAILY    glipiZIDE (GLUCOTROL) 5 MG tablet [Pharmacy Med Name: GLIPIZIDE 5MG TABLETS] 360 tablet 1     Sig: TAKE 2 TABLETS BY MOUTH TWICE DAILY BEFORE MEALS    rosuvastatin (CRESTOR) 20 MG tablet [Pharmacy Med Name: ROSUVASTATIN 20MG TABLETS] 90 tablet 1     Sig: TAKE 1 TABLET BY MOUTH EVERY NIGHT    tamsulosin (FLOMAX) 0.4 MG capsule [Pharmacy Med Name: TAMSULOSIN 0.4MG CAPSULES] 90 capsule 1     Sig: TAKE 1 CAPSULE BY MOUTH DAILY    metoprolol tartrate (LOPRESSOR) 25 MG tablet [Pharmacy Med Name: METOPROLOL TARTRATE 25MG TABLETS] 90 tablet 1     Sig: TAKE 1 TABLET BY MOUTH DAILY       Medication is on current medication list Yes    Dosage and directions were verified? Yes    Quantity verified: 90 day supply     Pharmacy Verified?  Yes    Last Appointment:  7/9/2024    Future appts:  Future Appointments   Date Time Provider Department Center   1/9/2025 11:00 AM Jose Antonio Mendez APRN - [facility]        (If no appt send self scheduling link. .REFILLAPPT)  Scheduling request sent?     [] Yes  [x] No    Does patient need updated?  [] Yes  [x] No

## 2025-01-07 DIAGNOSIS — E83.42 HYPOMAGNESEMIA: ICD-10-CM

## 2025-01-07 RX ORDER — MAGNESIUM OXIDE 400 MG/1
400 TABLET ORAL DAILY
Qty: 90 TABLET | Refills: 1 | Status: SHIPPED | OUTPATIENT
Start: 2025-01-07

## 2025-01-07 NOTE — TELEPHONE ENCOUNTER
Name of Medication(s) Requested:  Requested Prescriptions     Pending Prescriptions Disp Refills    magnesium oxide (MAG-OX) 400 MG tablet 90 tablet 1     Sig: Take 1 tablet by mouth daily       Medication is on current medication list Yes    Dosage and directions were verified? Yes    Quantity verified: 90 day supply     Pharmacy Verified?  Yes    Last Appointment:  7/9/2024    Future appts:  Future Appointments   Date Time Provider Department Center   1/9/2025 11:00 AM Jose Antonio Mendez APRN - [facility]        (If no appt send self scheduling link. .REFILLAPPT)  Scheduling request sent?     [] Yes  [x] No    Does patient need updated?  [] Yes  [x] No

## 2025-01-09 ENCOUNTER — OFFICE VISIT (OUTPATIENT)
Dept: PRIMARY CARE CLINIC | Age: 85
End: 2025-01-09
Payer: MEDICARE

## 2025-01-09 VITALS
DIASTOLIC BLOOD PRESSURE: 72 MMHG | TEMPERATURE: 97.4 F | RESPIRATION RATE: 18 BRPM | BODY MASS INDEX: 29.35 KG/M2 | HEART RATE: 96 BPM | WEIGHT: 187.4 LBS | OXYGEN SATURATION: 97 % | SYSTOLIC BLOOD PRESSURE: 124 MMHG

## 2025-01-09 DIAGNOSIS — E11.42 TYPE 2 DIABETES MELLITUS WITH DIABETIC POLYNEUROPATHY, WITHOUT LONG-TERM CURRENT USE OF INSULIN (HCC): Primary | ICD-10-CM

## 2025-01-09 DIAGNOSIS — M15.0 PRIMARY OSTEOARTHRITIS INVOLVING MULTIPLE JOINTS: ICD-10-CM

## 2025-01-09 DIAGNOSIS — I25.118 CORONARY ARTERY DISEASE OF NATIVE HEART WITH STABLE ANGINA PECTORIS, UNSPECIFIED VESSEL OR LESION TYPE (HCC): ICD-10-CM

## 2025-01-09 DIAGNOSIS — J44.9 CHRONIC OBSTRUCTIVE PULMONARY DISEASE, UNSPECIFIED COPD TYPE (HCC): ICD-10-CM

## 2025-01-09 DIAGNOSIS — I10 PRIMARY HYPERTENSION: ICD-10-CM

## 2025-01-09 DIAGNOSIS — F33.1 MAJOR DEPRESSIVE DISORDER, RECURRENT, MODERATE (HCC): ICD-10-CM

## 2025-01-09 DIAGNOSIS — R39.14 BENIGN PROSTATIC HYPERPLASIA WITH INCOMPLETE BLADDER EMPTYING: ICD-10-CM

## 2025-01-09 DIAGNOSIS — N40.1 BENIGN PROSTATIC HYPERPLASIA WITH INCOMPLETE BLADDER EMPTYING: ICD-10-CM

## 2025-01-09 DIAGNOSIS — L30.9 ECZEMA, UNSPECIFIED TYPE: ICD-10-CM

## 2025-01-09 PROCEDURE — G8417 CALC BMI ABV UP PARAM F/U: HCPCS | Performed by: NURSE PRACTITIONER

## 2025-01-09 PROCEDURE — 3074F SYST BP LT 130 MM HG: CPT | Performed by: NURSE PRACTITIONER

## 2025-01-09 PROCEDURE — 3023F SPIROM DOC REV: CPT | Performed by: NURSE PRACTITIONER

## 2025-01-09 PROCEDURE — 3078F DIAST BP <80 MM HG: CPT | Performed by: NURSE PRACTITIONER

## 2025-01-09 PROCEDURE — 3044F HG A1C LEVEL LT 7.0%: CPT | Performed by: NURSE PRACTITIONER

## 2025-01-09 PROCEDURE — 1159F MED LIST DOCD IN RCRD: CPT | Performed by: NURSE PRACTITIONER

## 2025-01-09 PROCEDURE — G8427 DOCREV CUR MEDS BY ELIG CLIN: HCPCS | Performed by: NURSE PRACTITIONER

## 2025-01-09 PROCEDURE — 1123F ACP DISCUSS/DSCN MKR DOCD: CPT | Performed by: NURSE PRACTITIONER

## 2025-01-09 PROCEDURE — 4004F PT TOBACCO SCREEN RCVD TLK: CPT | Performed by: NURSE PRACTITIONER

## 2025-01-09 PROCEDURE — 1160F RVW MEDS BY RX/DR IN RCRD: CPT | Performed by: NURSE PRACTITIONER

## 2025-01-09 PROCEDURE — 99214 OFFICE O/P EST MOD 30 MIN: CPT | Performed by: NURSE PRACTITIONER

## 2025-01-09 RX ORDER — DESONIDE 0.5 MG/G
1 OINTMENT TOPICAL 2 TIMES DAILY PRN
Qty: 60 G | Refills: 5 | Status: SHIPPED | OUTPATIENT
Start: 2025-01-09

## 2025-01-09 RX ORDER — DESONIDE 0.5 MG/G
1 OINTMENT TOPICAL 2 TIMES DAILY PRN
COMMUNITY
End: 2025-01-09 | Stop reason: SDUPTHER

## 2025-01-09 SDOH — ECONOMIC STABILITY: FOOD INSECURITY: WITHIN THE PAST 12 MONTHS, YOU WORRIED THAT YOUR FOOD WOULD RUN OUT BEFORE YOU GOT MONEY TO BUY MORE.: NEVER TRUE

## 2025-01-09 SDOH — ECONOMIC STABILITY: FOOD INSECURITY: WITHIN THE PAST 12 MONTHS, THE FOOD YOU BOUGHT JUST DIDN'T LAST AND YOU DIDN'T HAVE MONEY TO GET MORE.: NEVER TRUE

## 2025-01-09 ASSESSMENT — ENCOUNTER SYMPTOMS
NAUSEA: 0
CHEST TIGHTNESS: 0
CHOKING: 0
ABDOMINAL DISTENTION: 0
BLOOD IN STOOL: 0
ABDOMINAL PAIN: 0
DIARRHEA: 0
BACK PAIN: 1
FACIAL SWELLING: 0
RHINORRHEA: 0
VOICE CHANGE: 0
SINUS PAIN: 0
EYE ITCHING: 0
EYE REDNESS: 0
COLOR CHANGE: 0
TROUBLE SWALLOWING: 0
SORE THROAT: 0
EYE DISCHARGE: 0
EYE PAIN: 0
CONSTIPATION: 1
SHORTNESS OF BREATH: 1
COUGH: 0
WHEEZING: 0
VOMITING: 0
PHOTOPHOBIA: 0
SINUS PRESSURE: 0

## 2025-01-09 ASSESSMENT — PATIENT HEALTH QUESTIONNAIRE - PHQ9
4. FEELING TIRED OR HAVING LITTLE ENERGY: NEARLY EVERY DAY
10. IF YOU CHECKED OFF ANY PROBLEMS, HOW DIFFICULT HAVE THESE PROBLEMS MADE IT FOR YOU TO DO YOUR WORK, TAKE CARE OF THINGS AT HOME, OR GET ALONG WITH OTHER PEOPLE: NOT DIFFICULT AT ALL
6. FEELING BAD ABOUT YOURSELF - OR THAT YOU ARE A FAILURE OR HAVE LET YOURSELF OR YOUR FAMILY DOWN: NOT AT ALL
SUM OF ALL RESPONSES TO PHQ QUESTIONS 1-9: 15
SUM OF ALL RESPONSES TO PHQ QUESTIONS 1-9: 18
SUM OF ALL RESPONSES TO PHQ9 QUESTIONS 1 & 2: 2
5. POOR APPETITE OR OVEREATING: NEARLY EVERY DAY
8. MOVING OR SPEAKING SO SLOWLY THAT OTHER PEOPLE COULD HAVE NOTICED. OR THE OPPOSITE, BEING SO FIGETY OR RESTLESS THAT YOU HAVE BEEN MOVING AROUND A LOT MORE THAN USUAL: SEVERAL DAYS
2. FEELING DOWN, DEPRESSED OR HOPELESS: SEVERAL DAYS
SUM OF ALL RESPONSES TO PHQ QUESTIONS 1-9: 18
1. LITTLE INTEREST OR PLEASURE IN DOING THINGS: SEVERAL DAYS
3. TROUBLE FALLING OR STAYING ASLEEP: NEARLY EVERY DAY
7. TROUBLE CONCENTRATING ON THINGS, SUCH AS READING THE NEWSPAPER OR WATCHING TELEVISION: NEARLY EVERY DAY
9. THOUGHTS THAT YOU WOULD BE BETTER OFF DEAD, OR OF HURTING YOURSELF: NEARLY EVERY DAY
SUM OF ALL RESPONSES TO PHQ QUESTIONS 1-9: 18

## 2025-01-09 ASSESSMENT — COLUMBIA-SUICIDE SEVERITY RATING SCALE - C-SSRS
2. HAVE YOU ACTUALLY HAD ANY THOUGHTS OF KILLING YOURSELF?: NO
6. HAVE YOU EVER DONE ANYTHING, STARTED TO DO ANYTHING, OR PREPARED TO DO ANYTHING TO END YOUR LIFE?: NO
1. WITHIN THE PAST MONTH, HAVE YOU WISHED YOU WERE DEAD OR WISHED YOU COULD GO TO SLEEP AND NOT WAKE UP?: YES

## 2025-01-09 ASSESSMENT — VISUAL ACUITY: OU: 1

## 2025-01-09 NOTE — PROGRESS NOTES
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     *Jimenez is seen today for a 6-month follow-up and a review of his overall condition and plan of care.  He is also here to discuss labs, that he has had recently drawn.  He reports that he has been having increased shortness of breath with exertion.  He states that this is not an abnormal thing for him.  He also reports intermittent episodes of chest pain.  He states its \"nothing really serious\".  He states that it has been present, since his open heart surgery in 2021.  He is continuing to report pain to his upper back and is ambulating with the use of a cane.  He states that his constipation is recurrent, but he is able to control it with an over-the-counter medicine.  A dry wound/scab noted to the right, lateral side of his face, beside his eye.  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 Baum, with dermatology.  Lastly, he states he has been having increased depression, due to his physical limitations.  He is refusing any medication or referral to counseling, at this time.  I did advise him that if he changes his mind to let me know and I will place this referral for him.  He does report understanding.  He

## 2025-01-09 NOTE — ASSESSMENT & PLAN NOTE
Chronic, worsening (exacerbation), He is refusing any medications or to go to counseling, for his depression

## 2025-01-20 DIAGNOSIS — E11.42 TYPE 2 DIABETES MELLITUS WITH DIABETIC POLYNEUROPATHY, WITHOUT LONG-TERM CURRENT USE OF INSULIN (HCC): ICD-10-CM

## 2025-01-20 RX ORDER — GABAPENTIN 300 MG/1
300 CAPSULE ORAL 2 TIMES DAILY
Qty: 60 CAPSULE | Refills: 5 | Status: SHIPPED | OUTPATIENT
Start: 2025-01-20 | End: 2025-07-19

## 2025-01-20 NOTE — TELEPHONE ENCOUNTER
Name of Medication(s) Requested:  Requested Prescriptions     Pending Prescriptions Disp Refills    gabapentin (NEURONTIN) 300 MG capsule [Pharmacy Med Name: GABAPENTIN 300MG CAPSULES] 60 capsule 5     Sig: Take 1 capsule by mouth 2 times daily for 180 days.       Medication is on current medication list Yes    Dosage and directions were verified? Yes    Quantity verified: 30 day supply     Pharmacy Verified?  Yes    Last Appointment:  1/9/2025    Future appts:  No future appointments.     (If no appt send self scheduling link. .REFILLAPPT)  Scheduling request sent?     [] Yes  [x] No    Does patient need updated?  [] Yes  [x] No

## 2025-04-21 DIAGNOSIS — R26.9 GAIT DIFFICULTY: ICD-10-CM

## 2025-04-21 DIAGNOSIS — R53.1 WEAKNESS GENERALIZED: Primary | ICD-10-CM

## 2025-05-23 DIAGNOSIS — I10 PRIMARY HYPERTENSION: ICD-10-CM

## 2025-05-23 RX ORDER — ASPIRIN 81 MG/1
81 TABLET, COATED ORAL DAILY
Qty: 90 TABLET | Refills: 1 | Status: SHIPPED | OUTPATIENT
Start: 2025-05-23

## 2025-05-23 NOTE — TELEPHONE ENCOUNTER
Name of Medication(s) Requested:  Requested Prescriptions     Pending Prescriptions Disp Refills    ASPIRIN LOW DOSE 81 MG EC tablet [Pharmacy Med Name: ASPIRIN 81MG EC LOW DOSE  TABLETS] 90 tablet 1     Sig: TAKE 1 TABLET BY MOUTH DAILY       Medication is on current medication list Yes    Dosage and directions were verified? Yes    Quantity verified: 90 day supply     Pharmacy Verified?  Yes    Last Appointment:  1/9/2025    Future appts:  No future appointments.     (If no appt send self scheduling link. .REFILLAPPT)  Scheduling request sent?     [] Yes  [x] No    Does patient need updated?  [] Yes  [x] No

## 2025-06-27 DIAGNOSIS — E11.42 TYPE 2 DIABETES MELLITUS WITH DIABETIC POLYNEUROPATHY, WITHOUT LONG-TERM CURRENT USE OF INSULIN (HCC): ICD-10-CM

## 2025-06-27 RX ORDER — GABAPENTIN 300 MG/1
300 CAPSULE ORAL 2 TIMES DAILY
Qty: 60 CAPSULE | Refills: 5 | Status: SHIPPED | OUTPATIENT
Start: 2025-06-27 | End: 2025-12-24

## 2025-06-27 NOTE — TELEPHONE ENCOUNTER
Name of Medication(s) Requested:  Requested Prescriptions     Pending Prescriptions Disp Refills    gabapentin (NEURONTIN) 300 MG capsule 60 capsule 5     Sig: Take 1 capsule by mouth 2 times daily for 180 days.       Medication is on current medication list Yes    Dosage and directions were verified? Yes    Quantity verified: 30 day supply     Pharmacy Verified?  Yes    Last Appointment:  1/9/2025    Future appts:  No future appointments.   LM with daughter to schedule appt. Soon.     (If no appt send self scheduling link. .REFILLAPPT)  Scheduling request sent?     [] Yes  [x] No    Does patient need updated?  [] Yes  [x] No

## 2025-07-04 DIAGNOSIS — E78.49 OTHER HYPERLIPIDEMIA: ICD-10-CM

## 2025-07-04 DIAGNOSIS — R39.14 BENIGN PROSTATIC HYPERPLASIA WITH INCOMPLETE BLADDER EMPTYING: ICD-10-CM

## 2025-07-04 DIAGNOSIS — N40.1 BENIGN PROSTATIC HYPERPLASIA WITH INCOMPLETE BLADDER EMPTYING: ICD-10-CM

## 2025-07-04 DIAGNOSIS — M15.0 PRIMARY OSTEOARTHRITIS INVOLVING MULTIPLE JOINTS: ICD-10-CM

## 2025-07-04 DIAGNOSIS — I10 PRIMARY HYPERTENSION: ICD-10-CM

## 2025-07-04 DIAGNOSIS — E11.42 TYPE 2 DIABETES MELLITUS WITH DIABETIC POLYNEUROPATHY, WITHOUT LONG-TERM CURRENT USE OF INSULIN (HCC): ICD-10-CM

## 2025-07-07 RX ORDER — METOPROLOL TARTRATE 25 MG/1
25 TABLET, FILM COATED ORAL DAILY
Qty: 90 TABLET | Refills: 1 | Status: SHIPPED | OUTPATIENT
Start: 2025-07-07 | End: 2026-01-03

## 2025-07-07 RX ORDER — ROSUVASTATIN CALCIUM 20 MG/1
20 TABLET, COATED ORAL NIGHTLY
Qty: 90 TABLET | Refills: 1 | Status: SHIPPED | OUTPATIENT
Start: 2025-07-07

## 2025-07-07 RX ORDER — TAMSULOSIN HYDROCHLORIDE 0.4 MG/1
0.4 CAPSULE ORAL DAILY
Qty: 90 CAPSULE | Refills: 1 | Status: SHIPPED | OUTPATIENT
Start: 2025-07-07 | End: 2026-01-03

## 2025-07-07 RX ORDER — GLIPIZIDE 5 MG/1
TABLET ORAL
Qty: 360 TABLET | Refills: 1 | Status: SHIPPED | OUTPATIENT
Start: 2025-07-07

## 2025-07-07 RX ORDER — CELECOXIB 100 MG/1
100 CAPSULE ORAL DAILY
Qty: 90 CAPSULE | Refills: 1 | Status: SHIPPED | OUTPATIENT
Start: 2025-07-07

## 2025-07-07 NOTE — TELEPHONE ENCOUNTER
Name of Medication(s) Requested:  Requested Prescriptions     Pending Prescriptions Disp Refills    glipiZIDE (GLUCOTROL) 5 MG tablet [Pharmacy Med Name: GLIPIZIDE 5MG TABLETS] 360 tablet 1     Sig: TAKE 2 TABLETS BY MOUTH TWICE DAILY BEFORE MEALS    tamsulosin (FLOMAX) 0.4 MG capsule [Pharmacy Med Name: TAMSULOSIN 0.4MG CAPSULES] 90 capsule 1     Sig: TAKE 1 CAPSULE BY MOUTH DAILY    metoprolol tartrate (LOPRESSOR) 25 MG tablet [Pharmacy Med Name: METOPROLOL TARTRATE 25MG TABLETS] 90 tablet 1     Sig: TAKE 1 TABLET BY MOUTH DAILY    celecoxib (CELEBREX) 100 MG capsule [Pharmacy Med Name: CELECOXIB 100MG CAPSULES] 90 capsule 1     Sig: TAKE 1 CAPSULE BY MOUTH DAILY    rosuvastatin (CRESTOR) 20 MG tablet [Pharmacy Med Name: ROSUVASTATIN 20MG TABLETS] 90 tablet 1     Sig: TAKE 1 TABLET BY MOUTH EVERY NIGHT       Medication is on current medication list Yes    Dosage and directions were verified? Yes    Quantity verified: 90 day supply     Pharmacy Verified?  Yes    Last Appointment:  1/9/2025    Future appts:  Future Appointments   Date Time Provider Department Center   8/4/2025 12:30 PM Jose Antonio Mendez, [facility] - [facility] DE[facility]        (If no appt send self scheduling link. .REFILLAPPT)  Scheduling request sent?     [] Yes  [x] No    Does patient need updated?  [] Yes  [x] No

## 2025-08-04 ENCOUNTER — OFFICE VISIT (OUTPATIENT)
Dept: PRIMARY CARE CLINIC | Age: 85
End: 2025-08-04
Payer: MEDICARE

## 2025-08-04 VITALS
HEIGHT: 67 IN | HEART RATE: 90 BPM | TEMPERATURE: 97.5 F | WEIGHT: 186.4 LBS | DIASTOLIC BLOOD PRESSURE: 64 MMHG | TEMPERATURE: 97.5 F | WEIGHT: 186.4 LBS | HEIGHT: 67 IN | HEART RATE: 90 BPM | SYSTOLIC BLOOD PRESSURE: 128 MMHG | DIASTOLIC BLOOD PRESSURE: 64 MMHG | OXYGEN SATURATION: 98 % | OXYGEN SATURATION: 98 % | BODY MASS INDEX: 29.26 KG/M2 | BODY MASS INDEX: 29.26 KG/M2 | SYSTOLIC BLOOD PRESSURE: 128 MMHG

## 2025-08-04 DIAGNOSIS — Z91.81 AT HIGH RISK FOR FALLS: ICD-10-CM

## 2025-08-04 DIAGNOSIS — J44.9 CHRONIC OBSTRUCTIVE PULMONARY DISEASE, UNSPECIFIED COPD TYPE (HCC): ICD-10-CM

## 2025-08-04 DIAGNOSIS — Z00.00 MEDICARE ANNUAL WELLNESS VISIT, SUBSEQUENT: Primary | ICD-10-CM

## 2025-08-04 DIAGNOSIS — E55.9 VITAMIN D DEFICIENCY: ICD-10-CM

## 2025-08-04 DIAGNOSIS — E11.42 TYPE 2 DIABETES MELLITUS WITH DIABETIC POLYNEUROPATHY, WITHOUT LONG-TERM CURRENT USE OF INSULIN (HCC): Primary | ICD-10-CM

## 2025-08-04 DIAGNOSIS — N40.1 BENIGN PROSTATIC HYPERPLASIA WITH INCOMPLETE BLADDER EMPTYING: ICD-10-CM

## 2025-08-04 DIAGNOSIS — E83.42 HYPOMAGNESEMIA: ICD-10-CM

## 2025-08-04 DIAGNOSIS — F33.1 MAJOR DEPRESSIVE DISORDER, RECURRENT, MODERATE (HCC): ICD-10-CM

## 2025-08-04 DIAGNOSIS — R39.14 BENIGN PROSTATIC HYPERPLASIA WITH INCOMPLETE BLADDER EMPTYING: ICD-10-CM

## 2025-08-04 DIAGNOSIS — E78.49 OTHER HYPERLIPIDEMIA: ICD-10-CM

## 2025-08-04 DIAGNOSIS — M15.0 PRIMARY OSTEOARTHRITIS INVOLVING MULTIPLE JOINTS: ICD-10-CM

## 2025-08-04 DIAGNOSIS — I25.118 CORONARY ARTERY DISEASE OF NATIVE HEART WITH STABLE ANGINA PECTORIS, UNSPECIFIED VESSEL OR LESION TYPE: ICD-10-CM

## 2025-08-04 DIAGNOSIS — E11.42 TYPE 2 DIABETES MELLITUS WITH DIABETIC POLYNEUROPATHY, WITHOUT LONG-TERM CURRENT USE OF INSULIN (HCC): ICD-10-CM

## 2025-08-04 DIAGNOSIS — I10 PRIMARY HYPERTENSION: ICD-10-CM

## 2025-08-04 DIAGNOSIS — Z12.5 SCREENING FOR PROSTATE CANCER: ICD-10-CM

## 2025-08-04 LAB — HBA1C MFR BLD: 6 %

## 2025-08-04 PROCEDURE — 3023F SPIROM DOC REV: CPT | Performed by: NURSE PRACTITIONER

## 2025-08-04 PROCEDURE — 3044F HG A1C LEVEL LT 7.0%: CPT | Performed by: NURSE PRACTITIONER

## 2025-08-04 PROCEDURE — 4004F PT TOBACCO SCREEN RCVD TLK: CPT | Performed by: NURSE PRACTITIONER

## 2025-08-04 PROCEDURE — 83036 HEMOGLOBIN GLYCOSYLATED A1C: CPT | Performed by: NURSE PRACTITIONER

## 2025-08-04 PROCEDURE — G0439 PPPS, SUBSEQ VISIT: HCPCS | Performed by: NURSE PRACTITIONER

## 2025-08-04 PROCEDURE — G8417 CALC BMI ABV UP PARAM F/U: HCPCS | Performed by: NURSE PRACTITIONER

## 2025-08-04 PROCEDURE — 99214 OFFICE O/P EST MOD 30 MIN: CPT | Performed by: NURSE PRACTITIONER

## 2025-08-04 PROCEDURE — G8427 DOCREV CUR MEDS BY ELIG CLIN: HCPCS | Performed by: NURSE PRACTITIONER

## 2025-08-04 RX ORDER — MAGNESIUM OXIDE 400 MG/1
400 TABLET ORAL DAILY
Qty: 90 TABLET | Refills: 1 | Status: SHIPPED | OUTPATIENT
Start: 2025-08-04

## 2025-08-04 ASSESSMENT — ENCOUNTER SYMPTOMS
SHORTNESS OF BREATH: 1
EYE DISCHARGE: 1
SINUS PAIN: 0
CHOKING: 0
VOICE CHANGE: 0
SORE THROAT: 0
CONSTIPATION: 0
ABDOMINAL DISTENTION: 0
EYE PAIN: 0
CHEST TIGHTNESS: 0
COUGH: 0
DIARRHEA: 0
EYE ITCHING: 0
BACK PAIN: 0
ABDOMINAL PAIN: 0
COLOR CHANGE: 0
FACIAL SWELLING: 0
RHINORRHEA: 0
SINUS PRESSURE: 0
TROUBLE SWALLOWING: 0
VOMITING: 0
BLOOD IN STOOL: 0
NAUSEA: 0
WHEEZING: 0
PHOTOPHOBIA: 0
EYE REDNESS: 1

## 2025-08-04 ASSESSMENT — PATIENT HEALTH QUESTIONNAIRE - PHQ9
SUM OF ALL RESPONSES TO PHQ QUESTIONS 1-9: 12
4. FEELING TIRED OR HAVING LITTLE ENERGY: NEARLY EVERY DAY
2. FEELING DOWN, DEPRESSED OR HOPELESS: NOT AT ALL
9. THOUGHTS THAT YOU WOULD BE BETTER OFF DEAD, OR OF HURTING YOURSELF: SEVERAL DAYS
SUM OF ALL RESPONSES TO PHQ QUESTIONS 1-9: 12
SUM OF ALL RESPONSES TO PHQ QUESTIONS 1-9: 11
7. TROUBLE CONCENTRATING ON THINGS, SUCH AS READING THE NEWSPAPER OR WATCHING TELEVISION: NEARLY EVERY DAY
10. IF YOU CHECKED OFF ANY PROBLEMS, HOW DIFFICULT HAVE THESE PROBLEMS MADE IT FOR YOU TO DO YOUR WORK, TAKE CARE OF THINGS AT HOME, OR GET ALONG WITH OTHER PEOPLE: NOT DIFFICULT AT ALL
8. MOVING OR SPEAKING SO SLOWLY THAT OTHER PEOPLE COULD HAVE NOTICED. OR THE OPPOSITE, BEING SO FIGETY OR RESTLESS THAT YOU HAVE BEEN MOVING AROUND A LOT MORE THAN USUAL: SEVERAL DAYS
5. POOR APPETITE OR OVEREATING: MORE THAN HALF THE DAYS
6. FEELING BAD ABOUT YOURSELF - OR THAT YOU ARE A FAILURE OR HAVE LET YOURSELF OR YOUR FAMILY DOWN: NOT AT ALL
SUM OF ALL RESPONSES TO PHQ QUESTIONS 1-9: 12
3. TROUBLE FALLING OR STAYING ASLEEP: MORE THAN HALF THE DAYS
1. LITTLE INTEREST OR PLEASURE IN DOING THINGS: NOT AT ALL

## 2025-08-04 ASSESSMENT — LIFESTYLE VARIABLES
HOW OFTEN DO YOU HAVE A DRINK CONTAINING ALCOHOL: NEVER
HOW MANY STANDARD DRINKS CONTAINING ALCOHOL DO YOU HAVE ON A TYPICAL DAY: PATIENT DOES NOT DRINK

## 2025-08-04 ASSESSMENT — COLUMBIA-SUICIDE SEVERITY RATING SCALE - C-SSRS
6. HAVE YOU EVER DONE ANYTHING, STARTED TO DO ANYTHING, OR PREPARED TO DO ANYTHING TO END YOUR LIFE?: NO
2. HAVE YOU ACTUALLY HAD ANY THOUGHTS OF KILLING YOURSELF?: NO
1. WITHIN THE PAST MONTH, HAVE YOU WISHED YOU WERE DEAD OR WISHED YOU COULD GO TO SLEEP AND NOT WAKE UP?: YES

## 2025-08-04 ASSESSMENT — VISUAL ACUITY: OU: 1

## (undated) DEVICE — LANCET AORTOTOMY 3.3X10MM

## (undated) DEVICE — PERFUSION PACK CUST OPN HRT

## (undated) DEVICE — DRAIN SURG L3/8-1/2IN DIA3/16IN SIL CARD CONN 1:1 BLAK

## (undated) DEVICE — Z DUP USE 2257490 ADHESIVE SKIN CLSRE 036ML TPCL 2CTL CNCRLTE HIGH VSCSTY DRMB

## (undated) DEVICE — CATHETER,URETHRAL,REDRUBBER,STRL,18FR: Brand: MEDLINE

## (undated) DEVICE — BASIC SINGLE BASIN 1-LF: Brand: MEDLINE INDUSTRIES, INC.

## (undated) DEVICE — Device: Brand: CLEANCUT ROTATING AORTIC PUNCH

## (undated) DEVICE — SYRINGE MED 50ML LUERLOCK TIP

## (undated) DEVICE — CANNULA NSL ORAL AD FOR CAPNOFLEX CO2 O2 AIRLFE

## (undated) DEVICE — CANNULA INJ L2.5IN BLNT TIP 3MM CLR BODY W/ 1 W VLV DLP

## (undated) DEVICE — PICO 7 10CM X 30CM: Brand: PICO™ 7

## (undated) DEVICE — GLOVE SURG SZ 6 THK91MIL LTX FREE SYN POLYISOPRENE ANTI

## (undated) DEVICE — BLOWER COR ART L16.5CM PLAS SHFT MAL W/ MIST IV SET AXIUS

## (undated) DEVICE — PACK OPEN HRT DRP

## (undated) DEVICE — MPS® PRESSURE LINE W/TRANSDUCER: Brand: MPS

## (undated) DEVICE — SET CARDIAC I

## (undated) DEVICE — TIP APPL GEL PLT ENDO 5MMX32CM

## (undated) DEVICE — DRESSING FOAM W22XL25CM FILVE LAYR FOAM DP DEF SAFETAC

## (undated) DEVICE — KIT PLT RATIO DISPNS KT 2IN CANN TIP SPRY TIP DISP MAGELLAN

## (undated) DEVICE — Device

## (undated) DEVICE — SYSTEM ENDOSCP VES HARV W/ TOOL CANN SEAL SHT PRT BLNT TIP

## (undated) DEVICE — CANNULA PERF 7FR L5.5IN AORT ROOT RADPQ STD TIP W/ VENT LN

## (undated) DEVICE — RETRACTOR SURG INSRT SUT HLD OCTOBASE

## (undated) DEVICE — GAUZE,SPONGE,POST-OP,4X3,STRL,LF: Brand: MEDLINE

## (undated) DEVICE — SET SURG BASIN OPEN HEART NO  1 REUSABLE

## (undated) DEVICE — TOWEL,OR,DSP,ST,WHITE,DLX,4/PK,20PK/CS: Brand: MEDLINE

## (undated) DEVICE — DEFENDO AIR WATER SUCTION AND BIOPSY VALVE KIT FOR  OLYMPUS: Brand: DEFENDO AIR/WATER/SUCTION AND BIOPSY VALVE

## (undated) DEVICE — SOLUTION IV 50ML 0.9% SOD CHL PLAS CONT USP VIAFLX

## (undated) DEVICE — TAPE ADH W2INXL10YD WHT PAPR GENTLE BRTH FLX COMFORTABLE

## (undated) DEVICE — Z INACTIVE USE 2662641 SOLUTION IV 1000ML 140MEQ/L SOD 5MEQ/L K 3MEQ/L MG 27MEQ/L

## (undated) DEVICE — MPS® DELIVERY SET W/ARREST AGENT AND ADDITIVE CASSETTES, HEAT EXCHANGER & 10 FT. DELIVERY TUBING: Brand: MPS

## (undated) DEVICE — PADDLE INTERN DEFIB CHILD

## (undated) DEVICE — RETRACTOR RULTRACT INTERN MAMMARY ARTERY

## (undated) DEVICE — GOWN,SIRUS,FABRNF,XL,20/CS: Brand: MEDLINE

## (undated) DEVICE — TOWEL,OR,DSP,ST,BLUE,STD,6/PK,12PK/CS: Brand: MEDLINE

## (undated) DEVICE — GLOVE ORANGE PI 7 1/2   MSG9075

## (undated) DEVICE — INSUFFLATION TUBING SET WITH FILTER, FUNNEL CONNECTOR AND LUER LOCK: Brand: JOSNOE MEDICAL INC

## (undated) DEVICE — SYRINGE MED 20ML STD CLR PLAS LUERSLIP TIP N CTRL DISP

## (undated) DEVICE — AVID DUAL STAGE VENOUS DRAINAGE CANNULA: Brand: AVID DUAL STAGE VENOUS DRAINAGE CANNULA

## (undated) DEVICE — BLADE CLIPPER GEN PURP NS

## (undated) DEVICE — ALCOHOL 70% RUBBING 16OZ

## (undated) DEVICE — Z DISCONTINUED USE 2624852 GLOVE SURG 7 PF TEXT NEOPRNE BRN STRL NEOLON 2G LF

## (undated) DEVICE — EZ GLIDE AORTIC CANNULA: Brand: EDWARDS LIFESCIENCES EZ GLIDE AORTIC CANNULA

## (undated) DEVICE — TAPE ADH W2INXL10YD PLAS TRNSPAR H2O RESIST HYPOALRG CURAD

## (undated) DEVICE — 6 FOOT DISPOSABLE EXTENSION CABLE WITH SAFE CONNECT / SCREW-DOWN

## (undated) DEVICE — 1.5L THIN WALL CAN: Brand: CRD

## (undated) DEVICE — RETROGRADE CARDIOPLEGIA CATHETER: Brand: EDWARDS LIFESCIENCES RETROGRADE CARDIOPLEGIA CATHETER

## (undated) DEVICE — BLOOD TRANSFUSION FILTER: Brand: HAEMONETICS

## (undated) DEVICE — GOWN,SIRUS,FABRNF,L,20/CS: Brand: MEDLINE

## (undated) DEVICE — E-Z CLEAN, NON-STICK, PTFE COATED, ELECTROSURGICAL BLADE ELECTRODE, MODIFIED EXTENDED INSULATION, 6.5 INCH (16.5 CM): Brand: MEGADYNE

## (undated) DEVICE — SET ACB VEIN

## (undated) DEVICE — BLADE OPHTH 180DEG CUT SURF BLU STR SHRP DBL BVL GRINDLESS

## (undated) DEVICE — DRAIN CHN 19FR L0.25MM DIA6.3MM SIL RND HUBLESS FULL FLUT

## (undated) DEVICE — GLOVE SURG SZ 6.5 L11.2IN FNGR THK9.8MIL STRW LTX POLYMER

## (undated) DEVICE — CLOTH SURG PREP PREOPERATIVE CHLORHEXIDINE GLUC 2% READYPREP

## (undated) DEVICE — CONNECTOR PERF W64XH64XL64MM W  LUERLOCK

## (undated) DEVICE — CARDIAC STRYKER STERNAL SAW

## (undated) DEVICE — CONNECTOR DRNGE W3/8-0.5XH3/16XL3/16IN 2:1 SIL CARD STR

## (undated) DEVICE — DISCONTINUED USE 320496 BATTERY 50-800-01 OR 50-800-03 SNGL USE

## (undated) DEVICE — GLOVE ORANGE PI 7   MSG9070

## (undated) DEVICE — SURGICAL PROCEDURE PACK CRD SEHC

## (undated) DEVICE — GLOVE SURG SZ 65 THK91MIL LTX FREE SYN POLYISOPRENE

## (undated) DEVICE — SET CARDIAC II

## (undated) DEVICE — TTL1LYR 16FR10ML 100%SIL TMPST TR: Brand: MEDLINE

## (undated) DEVICE — BITEBLOCK 54FR W/ DENT RIM BLOX

## (undated) DEVICE — CAMERA CARDIAC STRYKER 1488 HD

## (undated) DEVICE — LABEL MED CARD SURG 4 IN PANEL STRL

## (undated) DEVICE — 3M™ TEGADERM™ CHG DRESSING 25/CARTON 4 CARTONS/CASE 1657: Brand: TEGADERM™

## (undated) DEVICE — TUBING, SUCTION, 3/16" X 12', STRAIGHT: Brand: MEDLINE

## (undated) DEVICE — SOLUTION IV IRRIG WATER 1000ML POUR BRL 2F7114

## (undated) DEVICE — DRAIN SURG SGL COLL PT TB FOR ATS BG OASIS

## (undated) DEVICE — AGENT HEMSTAT W4XL8IN OXIDIZED REGENERATED CELOS ABSRB